# Patient Record
Sex: FEMALE | Race: WHITE | NOT HISPANIC OR LATINO | Employment: UNEMPLOYED | URBAN - METROPOLITAN AREA
[De-identification: names, ages, dates, MRNs, and addresses within clinical notes are randomized per-mention and may not be internally consistent; named-entity substitution may affect disease eponyms.]

---

## 2018-02-05 ENCOUNTER — HOSPITAL ENCOUNTER (OUTPATIENT)
Dept: URGENT CARE | Facility: CLINIC | Age: 58
Setting detail: SPECIMEN
Discharge: HOME | End: 2018-02-05
Attending: FAMILY MEDICINE | Admitting: FAMILY MEDICINE

## 2018-02-05 LAB
AMPICILLIN SUSC ISLT: ABNORMAL
AZTREONAM SUSC ISLT: ABNORMAL
BACTERIA ISLT: ABNORMAL
BACTERIA SPEC AEROBE CULT: ABNORMAL
CEFAZOLIN SUSC ISLT: ABNORMAL
CEFEPIME SUSC ISLT: ABNORMAL
CEFTRIAXONE SUSC ISLT: ABNORMAL
CIPROFLOXACIN SUSC ISLT: ABNORMAL
COLONY COUNT: ABNORMAL
ERTAPENEM SUSC ISLT: ABNORMAL
LEVOFLOXACIN SUSC ISLT: ABNORMAL
Lab: ABNORMAL
MEROPENEM SUSC ISLT: ABNORMAL
MICRO REPORT STATUS: ABNORMAL
NITROFURANTOIN SUSC ISLT: ABNORMAL
PIP+TAZO SUSC ISLT: ABNORMAL
SPECIMEN SOURCE: ABNORMAL
SUSC METH SPEC: ABNORMAL
TETRACYCLINE SUSC ISLT: ABNORMAL
TOBRAMYCIN SUSC ISLT: ABNORMAL
TRIMETHOPRIM/SULFA: ABNORMAL

## 2020-04-22 ENCOUNTER — OFFICE VISIT (OUTPATIENT)
Dept: FAMILY MEDICINE CLINIC | Facility: CLINIC | Age: 60
End: 2020-04-22

## 2020-04-22 VITALS
HEART RATE: 91 BPM | SYSTOLIC BLOOD PRESSURE: 122 MMHG | OXYGEN SATURATION: 97 % | TEMPERATURE: 97 F | WEIGHT: 293 LBS | HEIGHT: 64 IN | BODY MASS INDEX: 50.02 KG/M2 | DIASTOLIC BLOOD PRESSURE: 75 MMHG | RESPIRATION RATE: 18 BRPM

## 2020-04-22 DIAGNOSIS — G35 MULTIPLE SCLEROSIS (HCC): Primary | ICD-10-CM

## 2020-04-22 PROCEDURE — 99203 OFFICE O/P NEW LOW 30 MIN: CPT | Performed by: FAMILY MEDICINE

## 2020-04-22 NOTE — PROGRESS NOTES
Chief Complaint   Patient presents with   • Establish Care     for PCP       History of Present Illness:  Subjective   Mackenzie Cornell is a 59 y.o. female.   History of Present Illness   Establish Care:   Patient is here today to establish care with a PCP. She reports that she hasn't been seen by a doctor since 2016.   She does have a history of MS. She reports that she controls her symptoms with her diet. She has been doing this since 2004. She reports that she is stable with her MS and her symptoms haven't increased or progressed.   She reports the last time she had a physical or labs done was several years ago.   She currently takes no prescriptions or medication other than the multivitamin.   She denies pain at this time.     Allergies:  Allergies   Allergen Reactions   • Sulfa Antibiotics Hives       Social History:  Social History     Socioeconomic History   • Marital status:      Spouse name: Not on file   • Number of children: Not on file   • Years of education: Not on file   • Highest education level: Not on file       Family History:  History reviewed. No pertinent family history.    Past Medical History :  Active Ambulatory Problems     Diagnosis Date Noted   • Multiple sclerosis (CMS/[facility]) 07/11/2016   • Body mass index (BMI) of 50-59.9 in adult (CMS/[facility]) 11/28/2017   • Transient global amnesia 07/11/2016     Resolved Ambulatory Problems     Diagnosis Date Noted   • No Resolved Ambulatory Problems     No Additional Past Medical History       Medication List:  Outpatient Encounter Medications as of 4/22/2020   Medication Sig Dispense Refill   • Multiple Vitamins-Minerals (MULTI FOR HER) pack 1 tablet Daily.       No facility-administered encounter medications on file as of 4/22/2020.        Past Surgical History:  Past Surgical History:   Procedure Laterality Date   • TONSILLECTOMY          The following portions of the patient's history were reviewed and updated as appropriate: allergies, current  "medications, past family history, past medical history, past social history, past surgical history and problem list.    Review Of Systems:  Review of Systems   Constitutional: Negative for activity change, appetite change and fatigue.   HENT: Negative for ear discharge, ear pain, postnasal drip and rhinorrhea.    Eyes: Negative for double vision and discharge.   Respiratory: Negative for cough, chest tightness and shortness of breath.    Cardiovascular: Negative for chest pain.   Endocrine: Negative for cold intolerance and heat intolerance.   Musculoskeletal: Negative for back pain, gait problem and joint swelling.   Skin: Negative for color change and rash.   Neurological: Negative for dizziness, facial asymmetry and confusion.   Psychiatric/Behavioral: Negative for behavioral problems.       Objective     Physical Exam:  Vital Signs:  Visit Vitals  /75   Pulse 91   Temp 97 °F (36.1 °C)   Resp 18   Ht 162.6 cm (64\")   Wt (!) 144 kg (318 lb 6.4 oz)   SpO2 97%   BMI 54.65 kg/m²       Physical Exam   Constitutional: She is oriented to person, place, and time. She appears well-developed and well-nourished.   HENT:   Head: Normocephalic.   Right Ear: External ear normal.   Left Ear: External ear normal.   Nose: Nose normal.   Eyes: Conjunctivae are normal.   Neck: Normal range of motion. Neck supple.   Cardiovascular: Normal rate and regular rhythm.   Pulmonary/Chest: Effort normal and breath sounds normal.   Musculoskeletal: Normal range of motion.   Neurological: She is alert and oriented to person, place, and time.   Skin: Skin is warm and dry. Capillary refill takes less than 2 seconds.   Vitals reviewed.      Assessment/Plan   Assessment and Plan:  Diagnoses and all orders for this visit:    1. Multiple sclerosis (CMS/[facility]) (Primary)  Assessment & Plan:  She is currently stable without medicine.   She currently is seen by Dr. Figueroa for MS.                          "

## 2021-10-14 ENCOUNTER — OFFICE VISIT (OUTPATIENT)
Dept: FAMILY MEDICINE CLINIC | Facility: CLINIC | Age: 61
End: 2021-10-14

## 2021-10-14 VITALS
OXYGEN SATURATION: 98 % | BODY MASS INDEX: 50.02 KG/M2 | DIASTOLIC BLOOD PRESSURE: 74 MMHG | HEIGHT: 64 IN | HEART RATE: 72 BPM | TEMPERATURE: 97.1 F | SYSTOLIC BLOOD PRESSURE: 136 MMHG | WEIGHT: 293 LBS | RESPIRATION RATE: 20 BRPM

## 2021-10-14 DIAGNOSIS — R53.81 MALAISE AND FATIGUE: ICD-10-CM

## 2021-10-14 DIAGNOSIS — Z13.220 SCREENING FOR HYPERLIPIDEMIA: ICD-10-CM

## 2021-10-14 DIAGNOSIS — R53.83 MALAISE AND FATIGUE: ICD-10-CM

## 2021-10-14 DIAGNOSIS — Z13.29 SCREENING FOR HYPOTHYROIDISM: ICD-10-CM

## 2021-10-14 DIAGNOSIS — Z12.31 ENCOUNTER FOR SCREENING MAMMOGRAM FOR BREAST CANCER: ICD-10-CM

## 2021-10-14 DIAGNOSIS — Z00.00 PHYSICAL EXAM: Primary | ICD-10-CM

## 2021-10-14 PROCEDURE — 99396 PREV VISIT EST AGE 40-64: CPT | Performed by: FAMILY MEDICINE

## 2021-10-18 LAB
C TRACH RRNA CVX QL NAA+PROBE: NEGATIVE
CYTOLOGIST CVX/VAG CYTO: NORMAL
CYTOLOGY CVX/VAG DOC CYTO: NORMAL
CYTOLOGY CVX/VAG DOC THIN PREP: NORMAL
DX ICD CODE: NORMAL
HIV 1 & 2 AB SER-IMP: NORMAL
HPV I/H RISK 4 DNA CVX QL PROBE+SIG AMP: NEGATIVE
N GONORRHOEA RRNA CVX QL NAA+PROBE: NEGATIVE
OTHER STN SPEC: NORMAL
STAT OF ADQ CVX/VAG CYTO-IMP: NORMAL

## 2021-11-02 PROBLEM — Z00.00 PHYSICAL EXAM: Status: ACTIVE | Noted: 2021-11-02

## 2021-11-08 ENCOUNTER — HOSPITAL ENCOUNTER (OUTPATIENT)
Dept: MAMMOGRAPHY | Facility: HOSPITAL | Age: 61
Discharge: HOME | End: 2021-11-08
Admitting: FAMILY MEDICINE

## 2021-11-08 PROCEDURE — 77063 BREAST TOMOSYNTHESIS BI: CPT

## 2021-11-08 PROCEDURE — 77067 SCR MAMMO BI INCL CAD: CPT

## 2021-11-17 ENCOUNTER — TELEPHONE (OUTPATIENT)
Dept: FAMILY MEDICINE CLINIC | Facility: CLINIC | Age: 61
End: 2021-11-17

## 2021-11-17 NOTE — TELEPHONE ENCOUNTER
PATIENT STATES: THAT SHE WOULD LIKE A CALL BACK TO GO OVER HER MAMMOGRAM PLEASE ADVISE      PATIENT CAN BE REACHED ON:

## 2021-11-18 NOTE — TELEPHONE ENCOUNTER
PATIENT CALLED BACK REQUESTING A CALL BACK WITH THE RESULTS OF HER MAMMOGRAM.    PATIENT CALLBACK: [phone]

## 2021-11-19 ENCOUNTER — APPOINTMENT (OUTPATIENT)
Dept: MAMMOGRAPHY | Facility: HOSPITAL | Age: 61
End: 2021-11-19

## 2023-11-15 ENCOUNTER — HOSPITAL ENCOUNTER (INPATIENT)
Facility: HOSPITAL | Age: 63
LOS: 3 days | Discharge: HOME | DRG: 689 | End: 2023-11-18
Attending: EMERGENCY MEDICINE | Admitting: HOSPITALIST
Payer: COMMERCIAL

## 2023-11-15 ENCOUNTER — APPOINTMENT (OUTPATIENT)
Dept: CT IMAGING | Facility: HOSPITAL | Age: 63
DRG: 689 | End: 2023-11-15
Payer: COMMERCIAL

## 2023-11-15 DIAGNOSIS — R41.0 CONFUSION: ICD-10-CM

## 2023-11-15 DIAGNOSIS — N39.0 URINARY TRACT INFECTION WITHOUT HEMATURIA, SITE UNSPECIFIED: Primary | ICD-10-CM

## 2023-11-15 DIAGNOSIS — G93.41 ACUTE METABOLIC ENCEPHALOPATHY: ICD-10-CM

## 2023-11-15 LAB
ALBUMIN SERPL-MCNC: 3.4 G/DL (ref 3.5–5.2)
ALBUMIN/GLOB SERPL: 1 G/DL
ALP SERPL-CCNC: 56 U/L (ref 39–117)
ALT SERPL W P-5'-P-CCNC: 19 U/L (ref 1–33)
AMMONIA BLD-SCNC: 11 UMOL/L (ref 11–51)
AMPHET+METHAMPHET UR QL: NEGATIVE
ANION GAP SERPL CALCULATED.3IONS-SCNC: 12 MMOL/L (ref 5–15)
AST SERPL-CCNC: 28 U/L (ref 1–32)
BACTERIA UR QL AUTO: ABNORMAL /HPF
BARBITURATES UR QL SCN: NEGATIVE
BASOPHILS # BLD AUTO: 0.1 10*3/MM3 (ref 0–0.2)
BASOPHILS NFR BLD AUTO: 0.9 % (ref 0–1.5)
BENZODIAZ UR QL SCN: NEGATIVE
BILIRUB SERPL-MCNC: 0.7 MG/DL (ref 0–1.2)
BILIRUB UR QL STRIP: ABNORMAL
BUN SERPL-MCNC: 9 MG/DL (ref 8–23)
BUN/CREAT SERPL: 12.7 (ref 7–25)
CALCIUM SPEC-SCNC: 8.5 MG/DL (ref 8.6–10.5)
CANNABINOIDS SERPL QL: NEGATIVE
CHLORIDE SERPL-SCNC: 95 MMOL/L (ref 98–107)
CLARITY UR: ABNORMAL
CO2 SERPL-SCNC: 27 MMOL/L (ref 22–29)
COCAINE UR QL: NEGATIVE
COHGB MFR BLD: 3.2 % (ref 0–3)
COLOR UR: ABNORMAL
CREAT SERPL-MCNC: 0.71 MG/DL (ref 0.57–1)
D-LACTATE SERPL-SCNC: 1.3 MMOL/L (ref 0.3–2)
DEPRECATED RDW RBC AUTO: 43.3 FL (ref 37–54)
EGFRCR SERPLBLD CKD-EPI 2021: 94.5 ML/MIN/1.73
EOSINOPHIL # BLD AUTO: 0 10*3/MM3 (ref 0–0.4)
EOSINOPHIL NFR BLD AUTO: 0.6 % (ref 0.3–6.2)
ERYTHROCYTE [DISTWIDTH] IN BLOOD BY AUTOMATED COUNT: 14.4 % (ref 12.3–15.4)
ETHANOL UR QL: <0.01 %
GLOBULIN UR ELPH-MCNC: 3.3 GM/DL
GLUCOSE SERPL-MCNC: 106 MG/DL (ref 65–99)
GLUCOSE UR STRIP-MCNC: NEGATIVE MG/DL
HCT VFR BLD AUTO: 40.9 % (ref 34–46.6)
HGB BLD-MCNC: 13.5 G/DL (ref 12–15.9)
HGB UR QL STRIP.AUTO: ABNORMAL
HYALINE CASTS UR QL AUTO: ABNORMAL /LPF
KETONES UR QL STRIP: ABNORMAL
LEUKOCYTE ESTERASE UR QL STRIP.AUTO: ABNORMAL
LYMPHOCYTES # BLD AUTO: 1.7 10*3/MM3 (ref 0.7–3.1)
LYMPHOCYTES NFR BLD AUTO: 23 % (ref 19.6–45.3)
MAGNESIUM SERPL-MCNC: 2.1 MG/DL (ref 1.6–2.4)
MCH RBC QN AUTO: 28.5 PG (ref 26.6–33)
MCHC RBC AUTO-ENTMCNC: 33.1 G/DL (ref 31.5–35.7)
MCV RBC AUTO: 86 FL (ref 79–97)
METHADONE UR QL SCN: NEGATIVE
MONOCYTES # BLD AUTO: 0.7 10*3/MM3 (ref 0.1–0.9)
MONOCYTES NFR BLD AUTO: 9.6 % (ref 5–12)
MUCOUS THREADS URNS QL MICRO: ABNORMAL /HPF
NEUTROPHILS NFR BLD AUTO: 4.9 10*3/MM3 (ref 1.7–7)
NEUTROPHILS NFR BLD AUTO: 65.9 % (ref 42.7–76)
NITRITE UR QL STRIP: POSITIVE
NRBC BLD AUTO-RTO: 0.1 /100 WBC (ref 0–0.2)
OPIATES UR QL: NEGATIVE
OXYCODONE UR QL SCN: NEGATIVE
PH UR STRIP.AUTO: 7 [PH] (ref 5–8)
PLATELET # BLD AUTO: 313 10*3/MM3 (ref 140–450)
PMV BLD AUTO: 7.8 FL (ref 6–12)
POTASSIUM SERPL-SCNC: 3.5 MMOL/L (ref 3.5–5.2)
PROT SERPL-MCNC: 6.7 G/DL (ref 6–8.5)
PROT UR QL STRIP: ABNORMAL
RBC # BLD AUTO: 4.76 10*6/MM3 (ref 3.77–5.28)
RBC # UR STRIP: ABNORMAL /HPF
REF LAB TEST METHOD: ABNORMAL
SODIUM SERPL-SCNC: 134 MMOL/L (ref 136–145)
SP GR UR STRIP: 1.02 (ref 1–1.03)
SQUAMOUS #/AREA URNS HPF: ABNORMAL /HPF
TSH SERPL DL<=0.05 MIU/L-ACNC: 1.4 UIU/ML (ref 0.27–4.2)
UROBILINOGEN UR QL STRIP: ABNORMAL
WBC # UR STRIP: ABNORMAL /HPF
WBC NRBC COR # BLD: 7.4 10*3/MM3 (ref 3.4–10.8)
WHOLE BLOOD HOLD COAG: NORMAL

## 2023-11-15 PROCEDURE — 80307 DRUG TEST PRSMV CHEM ANLYZR: CPT | Performed by: EMERGENCY MEDICINE

## 2023-11-15 PROCEDURE — 87088 URINE BACTERIA CULTURE: CPT | Performed by: EMERGENCY MEDICINE

## 2023-11-15 PROCEDURE — 93005 ELECTROCARDIOGRAM TRACING: CPT | Performed by: EMERGENCY MEDICINE

## 2023-11-15 PROCEDURE — 84443 ASSAY THYROID STIM HORMONE: CPT | Performed by: EMERGENCY MEDICINE

## 2023-11-15 PROCEDURE — 82375 ASSAY CARBOXYHB QUANT: CPT | Performed by: EMERGENCY MEDICINE

## 2023-11-15 PROCEDURE — 80053 COMPREHEN METABOLIC PANEL: CPT | Performed by: EMERGENCY MEDICINE

## 2023-11-15 PROCEDURE — 82077 ASSAY SPEC XCP UR&BREATH IA: CPT | Performed by: EMERGENCY MEDICINE

## 2023-11-15 PROCEDURE — 82140 ASSAY OF AMMONIA: CPT | Performed by: EMERGENCY MEDICINE

## 2023-11-15 PROCEDURE — 70450 CT HEAD/BRAIN W/O DYE: CPT

## 2023-11-15 PROCEDURE — P9612 CATHETERIZE FOR URINE SPEC: HCPCS

## 2023-11-15 PROCEDURE — 25010000002 CEFTRIAXONE PER 250 MG: Performed by: EMERGENCY MEDICINE

## 2023-11-15 PROCEDURE — 36415 COLL VENOUS BLD VENIPUNCTURE: CPT

## 2023-11-15 PROCEDURE — 83735 ASSAY OF MAGNESIUM: CPT | Performed by: EMERGENCY MEDICINE

## 2023-11-15 PROCEDURE — 99285 EMERGENCY DEPT VISIT HI MDM: CPT

## 2023-11-15 PROCEDURE — 87186 SC STD MICRODIL/AGAR DIL: CPT | Performed by: EMERGENCY MEDICINE

## 2023-11-15 PROCEDURE — 87086 URINE CULTURE/COLONY COUNT: CPT | Performed by: EMERGENCY MEDICINE

## 2023-11-15 PROCEDURE — 81001 URINALYSIS AUTO W/SCOPE: CPT | Performed by: EMERGENCY MEDICINE

## 2023-11-15 PROCEDURE — 83605 ASSAY OF LACTIC ACID: CPT

## 2023-11-15 PROCEDURE — 85025 COMPLETE CBC W/AUTO DIFF WBC: CPT | Performed by: EMERGENCY MEDICINE

## 2023-11-15 PROCEDURE — 87040 BLOOD CULTURE FOR BACTERIA: CPT | Performed by: EMERGENCY MEDICINE

## 2023-11-15 PROCEDURE — 25810000003 SODIUM CHLORIDE 0.9 % SOLUTION: Performed by: EMERGENCY MEDICINE

## 2023-11-15 RX ORDER — NITROGLYCERIN 0.4 MG/1
0.4 TABLET SUBLINGUAL
Status: DISCONTINUED | OUTPATIENT
Start: 2023-11-15 | End: 2023-11-18 | Stop reason: HOSPADM

## 2023-11-15 RX ORDER — ONDANSETRON 2 MG/ML
4 INJECTION INTRAMUSCULAR; INTRAVENOUS EVERY 6 HOURS PRN
Status: DISCONTINUED | OUTPATIENT
Start: 2023-11-15 | End: 2023-11-18 | Stop reason: HOSPADM

## 2023-11-15 RX ORDER — AMOXICILLIN 250 MG
2 CAPSULE ORAL 2 TIMES DAILY
Status: DISCONTINUED | OUTPATIENT
Start: 2023-11-16 | End: 2023-11-18 | Stop reason: HOSPADM

## 2023-11-15 RX ORDER — SODIUM CHLORIDE 9 MG/ML
40 INJECTION, SOLUTION INTRAVENOUS AS NEEDED
Status: DISCONTINUED | OUTPATIENT
Start: 2023-11-15 | End: 2023-11-18 | Stop reason: HOSPADM

## 2023-11-15 RX ORDER — SODIUM CHLORIDE 0.9 % (FLUSH) 0.9 %
10 SYRINGE (ML) INJECTION EVERY 12 HOURS SCHEDULED
Status: DISCONTINUED | OUTPATIENT
Start: 2023-11-15 | End: 2023-11-18 | Stop reason: HOSPADM

## 2023-11-15 RX ORDER — HEPARIN SODIUM 5000 [USP'U]/ML
5000 INJECTION, SOLUTION INTRAVENOUS; SUBCUTANEOUS EVERY 8 HOURS SCHEDULED
Status: DISCONTINUED | OUTPATIENT
Start: 2023-11-16 | End: 2023-11-18 | Stop reason: HOSPADM

## 2023-11-15 RX ORDER — BISACODYL 10 MG
10 SUPPOSITORY, RECTAL RECTAL DAILY PRN
Status: DISCONTINUED | OUTPATIENT
Start: 2023-11-15 | End: 2023-11-18 | Stop reason: HOSPADM

## 2023-11-15 RX ORDER — SODIUM CHLORIDE 0.9 % (FLUSH) 0.9 %
10 SYRINGE (ML) INJECTION AS NEEDED
Status: DISCONTINUED | OUTPATIENT
Start: 2023-11-15 | End: 2023-11-18 | Stop reason: HOSPADM

## 2023-11-15 RX ORDER — ACETAMINOPHEN 160 MG/5ML
650 SOLUTION ORAL EVERY 4 HOURS PRN
Status: DISCONTINUED | OUTPATIENT
Start: 2023-11-15 | End: 2023-11-17

## 2023-11-15 RX ORDER — POLYETHYLENE GLYCOL 3350 17 G/17G
17 POWDER, FOR SOLUTION ORAL DAILY PRN
Status: DISCONTINUED | OUTPATIENT
Start: 2023-11-15 | End: 2023-11-18 | Stop reason: HOSPADM

## 2023-11-15 RX ORDER — BISACODYL 5 MG/1
5 TABLET, DELAYED RELEASE ORAL DAILY PRN
Status: DISCONTINUED | OUTPATIENT
Start: 2023-11-15 | End: 2023-11-18 | Stop reason: HOSPADM

## 2023-11-15 RX ADMIN — CEFTRIAXONE 2000 MG: 2 INJECTION, POWDER, FOR SOLUTION INTRAMUSCULAR; INTRAVENOUS at 22:42

## 2023-11-15 RX ADMIN — SODIUM CHLORIDE 500 ML: 9 INJECTION, SOLUTION INTRAVENOUS at 22:49

## 2023-11-15 NOTE — Clinical Note
Level of Care: Telemetry [5]   Admitting Physician: CELESTINA FRANCIS [[id number]]   Attending Physician: CELESTINA FRANCIS [[id number]]

## 2023-11-16 PROBLEM — N39.0 ACUTE UTI (URINARY TRACT INFECTION): Status: ACTIVE | Noted: 2023-11-16

## 2023-11-16 LAB
ALBUMIN SERPL-MCNC: 3.3 G/DL (ref 3.5–5.2)
ALBUMIN/GLOB SERPL: 0.9 G/DL
ALP SERPL-CCNC: 58 U/L (ref 39–117)
ALT SERPL W P-5'-P-CCNC: 21 U/L (ref 1–33)
ANION GAP SERPL CALCULATED.3IONS-SCNC: 11 MMOL/L (ref 5–15)
AST SERPL-CCNC: 27 U/L (ref 1–32)
BASOPHILS # BLD AUTO: 0 10*3/MM3 (ref 0–0.2)
BASOPHILS NFR BLD AUTO: 0.7 % (ref 0–1.5)
BILIRUB SERPL-MCNC: 0.5 MG/DL (ref 0–1.2)
BUN SERPL-MCNC: 10 MG/DL (ref 8–23)
BUN/CREAT SERPL: 15.2 (ref 7–25)
CALCIUM SPEC-SCNC: 8.9 MG/DL (ref 8.6–10.5)
CHLORIDE SERPL-SCNC: 98 MMOL/L (ref 98–107)
CO2 SERPL-SCNC: 26 MMOL/L (ref 22–29)
CREAT SERPL-MCNC: 0.66 MG/DL (ref 0.57–1)
DEPRECATED RDW RBC AUTO: 42.9 FL (ref 37–54)
EGFRCR SERPLBLD CKD-EPI 2021: 97.5 ML/MIN/1.73
EOSINOPHIL # BLD AUTO: 0.1 10*3/MM3 (ref 0–0.4)
EOSINOPHIL NFR BLD AUTO: 1.5 % (ref 0.3–6.2)
ERYTHROCYTE [DISTWIDTH] IN BLOOD BY AUTOMATED COUNT: 14.3 % (ref 12.3–15.4)
GLOBULIN UR ELPH-MCNC: 3.8 GM/DL
GLUCOSE SERPL-MCNC: 135 MG/DL (ref 65–99)
HCT VFR BLD AUTO: 40.5 % (ref 34–46.6)
HGB BLD-MCNC: 13.3 G/DL (ref 12–15.9)
LYMPHOCYTES # BLD AUTO: 2 10*3/MM3 (ref 0.7–3.1)
LYMPHOCYTES NFR BLD AUTO: 27.5 % (ref 19.6–45.3)
MCH RBC QN AUTO: 28.6 PG (ref 26.6–33)
MCHC RBC AUTO-ENTMCNC: 32.9 G/DL (ref 31.5–35.7)
MCV RBC AUTO: 87 FL (ref 79–97)
MONOCYTES # BLD AUTO: 0.9 10*3/MM3 (ref 0.1–0.9)
MONOCYTES NFR BLD AUTO: 12.4 % (ref 5–12)
NEUTROPHILS NFR BLD AUTO: 4.1 10*3/MM3 (ref 1.7–7)
NEUTROPHILS NFR BLD AUTO: 57.9 % (ref 42.7–76)
NRBC BLD AUTO-RTO: 0.1 /100 WBC (ref 0–0.2)
PLATELET # BLD AUTO: 318 10*3/MM3 (ref 140–450)
PMV BLD AUTO: 7.6 FL (ref 6–12)
POTASSIUM SERPL-SCNC: 3.1 MMOL/L (ref 3.5–5.2)
PROT SERPL-MCNC: 7.1 G/DL (ref 6–8.5)
QT INTERVAL: 345 MS
QTC INTERVAL: 430 MS
RBC # BLD AUTO: 4.65 10*6/MM3 (ref 3.77–5.28)
SODIUM SERPL-SCNC: 135 MMOL/L (ref 136–145)
WBC NRBC COR # BLD AUTO: 7.1 10*3/MM3 (ref 3.4–10.8)

## 2023-11-16 PROCEDURE — 25010000002 CEFTRIAXONE PER 250 MG: Performed by: NURSE PRACTITIONER

## 2023-11-16 PROCEDURE — 36415 COLL VENOUS BLD VENIPUNCTURE: CPT | Performed by: NURSE PRACTITIONER

## 2023-11-16 PROCEDURE — 25010000002 ONDANSETRON PER 1 MG: Performed by: NURSE PRACTITIONER

## 2023-11-16 PROCEDURE — 80053 COMPREHEN METABOLIC PANEL: CPT | Performed by: NURSE PRACTITIONER

## 2023-11-16 PROCEDURE — 97162 PT EVAL MOD COMPLEX 30 MIN: CPT

## 2023-11-16 PROCEDURE — 97165 OT EVAL LOW COMPLEX 30 MIN: CPT

## 2023-11-16 PROCEDURE — 25010000002 HEPARIN (PORCINE) PER 1000 UNITS: Performed by: NURSE PRACTITIONER

## 2023-11-16 PROCEDURE — 85025 COMPLETE CBC W/AUTO DIFF WBC: CPT | Performed by: NURSE PRACTITIONER

## 2023-11-16 RX ADMIN — CEFTRIAXONE 2000 MG: 2 INJECTION, POWDER, FOR SOLUTION INTRAMUSCULAR; INTRAVENOUS at 21:07

## 2023-11-16 RX ADMIN — HEPARIN SODIUM 5000 UNITS: 5000 INJECTION INTRAVENOUS; SUBCUTANEOUS at 18:22

## 2023-11-16 RX ADMIN — Medication 10 ML: at 21:39

## 2023-11-16 RX ADMIN — ONDANSETRON 4 MG: 2 INJECTION INTRAMUSCULAR; INTRAVENOUS at 21:10

## 2023-11-16 RX ADMIN — DOCUSATE SODIUM 50MG AND SENNOSIDES 8.6MG 2 TABLET: 8.6; 5 TABLET, FILM COATED ORAL at 21:10

## 2023-11-16 NOTE — PLAN OF CARE
Problem: Adult Inpatient Plan of Care  Goal: Plan of Care Review  Outcome: Ongoing, Not Progressing  Flowsheets (Taken 11/15/2023 2330)  Progress: no change  Plan of Care Reviewed With:   patient   spouse   daughter  Outcome Evaluation: patient stable, getting IV abx, PT/OT consult, VSS, SR on tele, no complaints, plan of care continued  Goal: Patient-Specific Goal (Individualized)  Outcome: Ongoing, Not Progressing  Goal: Absence of Hospital-Acquired Illness or Injury  Outcome: Ongoing, Not Progressing  Intervention: Identify and Manage Fall Risk  Recent Flowsheet Documentation  Taken 11/15/2023 2330 by Lynne Scales, RN  Safety Promotion/Fall Prevention:   safety round/check completed   activity supervised   assistive device/personal items within reach   clutter free environment maintained   elopement precautions   fall prevention program maintained   gait belt   lighting adjusted   mobility aid in reach   nonskid shoes/slippers when out of bed   room organization consistent  Intervention: Prevent Skin Injury  Recent Flowsheet Documentation  Taken 11/15/2023 2330 by Lynne Scales, RN  Body Position:   supine   sitting up in bed   position changed independently  Intervention: Prevent and Manage VTE (Venous Thromboembolism) Risk  Recent Flowsheet Documentation  Taken 11/15/2023 2330 by Lynne Scales, RN  Activity Management: up to bedside commode  Goal: Optimal Comfort and Wellbeing  Outcome: Ongoing, Not Progressing  Intervention: Provide Person-Centered Care  Recent Flowsheet Documentation  Taken 11/15/2023 2330 by Lynne Scales, RN  Trust Relationship/Rapport:   care explained   choices provided   emotional support provided   empathic listening provided   questions answered   questions encouraged   reassurance provided   thoughts/feelings acknowledged  Goal: Readiness for Transition of Care  Outcome: Ongoing, Not Progressing  Intervention: Mutually Develop Transition Plan  Recent Flowsheet  Documentation  Taken 11/15/2023 2338 by Lynne Scales, RN  Equipment Currently Used at Home: (uses 2 canes at a time) cane, straight  Taken 11/15/2023 2337 by Lynne Scales, RN  Transportation Anticipated: family or friend will provide  Patient/Family Anticipated Services at Transition: none  Patient/Family Anticipates Transition to: home with family     Problem: Fall Injury Risk  Goal: Absence of Fall and Fall-Related Injury  Outcome: Ongoing, Not Progressing  Intervention: Identify and Manage Contributors  Recent Flowsheet Documentation  Taken 11/15/2023 2330 by Lynne Scales, RN  Medication Review/Management: medications reviewed  Intervention: Promote Injury-Free Environment  Recent Flowsheet Documentation  Taken 11/15/2023 2330 by Lynne Scales, RN  Safety Promotion/Fall Prevention:   safety round/check completed   activity supervised   assistive device/personal items within reach   clutter free environment maintained   elopement precautions   fall prevention program maintained   gait belt   lighting adjusted   mobility aid in reach   nonskid shoes/slippers when out of bed   room organization consistent     Problem: UTI (Urinary Tract Infection)  Goal: Improved Infection Symptoms  Outcome: Ongoing, Not Progressing   Goal Outcome Evaluation:  Plan of Care Reviewed With: patient, spouse, daughter        Progress: no change  Outcome Evaluation: patient stable, getting IV abx, PT/OT consult, VSS, SR on tele, no complaints, plan of care continued

## 2023-11-16 NOTE — ED NOTES
Pt and family reports increased confusion the past few days, with difficulty putting sentences together. Pt reports difficulty thinking and putting sentences together. Pt is A&Ox4. Pts family reports past hx of UTI's but never has affected her like this.

## 2023-11-16 NOTE — NURSING NOTE
Patient refusing IV placement at this time, wanted to wait until later this morning, will also get labs when we get new IV access. Patient has intermittent confusion and is confused at this time, alert to self and place, d/o to time and situation, plan of care continued

## 2023-11-16 NOTE — PLAN OF CARE
Goal Outcome Evaluation:  Plan of Care Reviewed With: patient, daughter        Progress: improving   Pt is a 64 y/o F admitted to [facility] on 11/15/23 with complaints of AMS over the past few days with trouble with syntax and aphasia. UA (+) for UTI. She also has a history of MS. Diagnosed this visit with acute metabolic encephalopathy secondary to fall ~1 week ago. She is currently living with spouse in multi-level home with 4 steps to enter and flight of steps to upstairs bedroom. At baseline, she reports being IND with household mobility, community ambulation, and ADLs with 2 SPCs in BLE. This date, pt is disoriented to situation and time. She is sitting up in recliner at time of PT eval. She completes transfers CGA>SBA and amb 30' CGA>min A with SPCs in BUE. She is very unsteady on her feet and exhibits very poor activity tolerance. Significant labored breathing and BLE weakness noted near the of the bout. Pt was educated on usage of RW at d/c for short-term usage to help decrease risk for falls and decrease amount of energy expenditure used for ambulation. Pt is well below baseline functioning at this time and will require skilled PT intervention during stay. She should be able to d/c back home with HHPT at d/c pending progress during stay. PT will follow during stay.    Anticipated Discharge Disposition (PT): home with home health

## 2023-11-16 NOTE — ED PROVIDER NOTES
Subjective   History of Present Illness  63-year-old female presents with family stating she had some altered mental status over the last few days.  States she had fallen a week ago.  Otherwise they report no new trauma she denies any pain she states she has no headache or chest pain or abdominal pain she had no vomiting or diarrhea.  She states she has been eating and drinking normally.  She reports no fevers or chills.  She reports no shortness of breath.  There is no reported focal numbness or weakness.  She denies any unusual ingestions and is currently on no medications.   states she has been talking confused and not able to put together thoughts coherently.  Review of Systems    Past Medical History:   Diagnosis Date    Transient global amnesia     about 10 years, and resolved within 1 day     Transient global amnesia, multiple sclerosis  Allergies   Allergen Reactions    Sulfa Antibiotics Hives       Past Surgical History:   Procedure Laterality Date    COLONOSCOPY      TONSILLECTOMY      WISDOM TOOTH EXTRACTION         Family History   Problem Relation Age of Onset    Hypertension Mother     Heart disease Mother         A-fib    Other Father         Alzheimers, dementia    Asthma Brother     No Known Problems Daughter     Asthma Son     Other Son         sleep apnea, narcelpsy       Social History     Socioeconomic History    Marital status:    Tobacco Use    Smoking status: Never    Smokeless tobacco: Never   Vaping Use    Vaping Use: Never used   Substance and Sexual Activity    Alcohol use: Yes     Comment: occasional    Drug use: Never    Sexual activity: Defer     Prior to Admission medications    Medication Sig Start Date End Date Taking? Authorizing Provider   Multiple Vitamins-Minerals (MULTI FOR HER) pack 1 tablet Daily. 7/11/16   Provider, MD Ava     /76 (BP Location: Left arm, Patient Position: Sitting)   Pulse 84   Temp 97.6 °F (36.4 °C)   Resp 18   Ht 162.6 cm  "(64.02\")   SpO2 96%   BMI 55.21 kg/m²         Objective   Physical Exam  General: Obese female in no acute distress, awake and alert  Eyes: Pupils midsized and equal, sclera nonicteric  HEENT: Mucous membranes somewhat dry, no mucosal swelling  Neck: Supple, no nuchal rigidity, no JVD  Respirations: Respirations nonlabored, equal breath sounds bilaterally, clear lungs  Heart regular rate and rhythm, no murmurs rubs or gallops,   Abdomen soft nontender nondistended, no hepatosplenomegaly, no hernia, no mass, normal bowel sounds, no CVA tenderness  Extremities no clubbing cyanosis or edema, calves are symmetric and nontender  Neuro cranial nerves II through XII intact , normal sensory/motor function and strength in four extremities, no slurred speech, no facial droop, normal finger to nose,  no nuchal rigidity  Psych oriented to person and place, cooperative; somewhat slow to respond  Skin no rash, brisk cap refill  Procedures           ED Course  ED Course as of 11/17/23 0741   Wed Nov 15, 2023   2209 Case and findings discussed with Rosemarie with the hospitalist service for admission. [SH]   2220 CT radiology report to be followed by Kirk MCCARTHY in the emergency room. [SH]      ED Course User Index  [SH] Braulio Alexander MD      Results for orders placed or performed during the hospital encounter of 11/15/23   Urine Culture - Urine, Straight Cath    Specimen: Straight Cath; Urine   Result Value Ref Range    Urine Culture >100,000 CFU/mL Escherichia coli (A)        Susceptibility    Escherichia coli - TRUPTI     Ampicillin  Resistant ug/ml     Ampicillin + Sulbactam  Resistant ug/ml     Cefazolin  Susceptible ug/ml     Cefepime  Susceptible ug/ml     Ceftazidime  Susceptible ug/ml     Ceftriaxone  Susceptible ug/ml     Gentamicin  Susceptible ug/ml     Levofloxacin  Susceptible ug/ml     Nitrofurantoin  Susceptible ug/ml     Piperacillin + Tazobactam  Susceptible ug/ml     Trimethoprim + Sulfamethoxazole  Susceptible " ug/ml   Blood Culture - Blood, Arm, Left    Specimen: Arm, Left; Blood   Result Value Ref Range    Blood Culture No growth at 24 hours    Blood Culture - Blood, Arm, Right    Specimen: Arm, Right; Blood   Result Value Ref Range    Blood Culture No growth at 24 hours    Comprehensive Metabolic Panel    Specimen: Blood   Result Value Ref Range    Glucose 106 (H) 65 - 99 mg/dL    BUN 9 8 - 23 mg/dL    Creatinine 0.71 0.57 - 1.00 mg/dL    Sodium 134 (L) 136 - 145 mmol/L    Potassium 3.5 3.5 - 5.2 mmol/L    Chloride 95 (L) 98 - 107 mmol/L    CO2 27.0 22.0 - 29.0 mmol/L    Calcium 8.5 (L) 8.6 - 10.5 mg/dL    Total Protein 6.7 6.0 - 8.5 g/dL    Albumin 3.4 (L) 3.5 - 5.2 g/dL    ALT (SGPT) 19 1 - 33 U/L    AST (SGOT) 28 1 - 32 U/L    Alkaline Phosphatase 56 39 - 117 U/L    Total Bilirubin 0.7 0.0 - 1.2 mg/dL    Globulin 3.3 gm/dL    A/G Ratio 1.0 g/dL    BUN/Creatinine Ratio 12.7 7.0 - 25.0    Anion Gap 12.0 5.0 - 15.0 mmol/L    eGFR 94.5 >60.0 mL/min/1.73   Urinalysis With Culture If Indicated - Straight Cath    Specimen: Straight Cath; Urine   Result Value Ref Range    Color, UA Dark Yellow (A) Yellow, Straw    Appearance, UA Turbid (A) Clear    pH, UA 7.0 5.0 - 8.0    Specific Gravity, UA 1.017 1.005 - 1.030    Glucose, UA Negative Negative    Ketones, UA 15 mg/dL (1+) (A) Negative    Bilirubin, UA Small (1+) (A) Negative    Blood, UA Moderate (2+) (A) Negative    Protein,  mg/dL (2+) (A) Negative    Leuk Esterase, UA Large (3+) (A) Negative    Nitrite, UA Positive (A) Negative    Urobilinogen, UA 2.0 E.U./dL (A) 0.2 - 1.0 E.U./dL   Ethanol    Specimen: Blood   Result Value Ref Range    Ethanol % <0.010 %   Urine Drug Screen - Straight Cath    Specimen: Straight Cath; Urine   Result Value Ref Range    Amphet/Methamphet, Screen Negative Negative    Barbiturates Screen, Urine Negative Negative    Benzodiazepine Screen, Urine Negative Negative    Cocaine Screen, Urine Negative Negative    Opiate Screen Negative  Negative    THC, Screen, Urine Negative Negative    Methadone Screen, Urine Negative Negative    Oxycodone Screen, Urine Negative Negative   TSH    Specimen: Blood   Result Value Ref Range    TSH 1.400 0.270 - 4.200 uIU/mL   Magnesium    Specimen: Blood   Result Value Ref Range    Magnesium 2.1 1.6 - 2.4 mg/dL   Ammonia    Specimen: Blood   Result Value Ref Range    Ammonia 11 11 - 51 umol/L   Carbon Monoxide, Blood    Specimen: Blood   Result Value Ref Range    Carbon Monoxide, Blood 3.2 (H) 0 - 3 %   CBC Auto Differential    Specimen: Blood   Result Value Ref Range    WBC 7.40 3.40 - 10.80 10*3/mm3    RBC 4.76 3.77 - 5.28 10*6/mm3    Hemoglobin 13.5 12.0 - 15.9 g/dL    Hematocrit 40.9 34.0 - 46.6 %    MCV 86.0 79.0 - 97.0 fL    MCH 28.5 26.6 - 33.0 pg    MCHC 33.1 31.5 - 35.7 g/dL    RDW 14.4 12.3 - 15.4 %    RDW-SD 43.3 37.0 - 54.0 fl    MPV 7.8 6.0 - 12.0 fL    Platelets 313 140 - 450 10*3/mm3    Neutrophil % 65.9 42.7 - 76.0 %    Lymphocyte % 23.0 19.6 - 45.3 %    Monocyte % 9.6 5.0 - 12.0 %    Eosinophil % 0.6 0.3 - 6.2 %    Basophil % 0.9 0.0 - 1.5 %    Neutrophils, Absolute 4.90 1.70 - 7.00 10*3/mm3    Lymphocytes, Absolute 1.70 0.70 - 3.10 10*3/mm3    Monocytes, Absolute 0.70 0.10 - 0.90 10*3/mm3    Eosinophils, Absolute 0.00 0.00 - 0.40 10*3/mm3    Basophils, Absolute 0.10 0.00 - 0.20 10*3/mm3    nRBC 0.1 0.0 - 0.2 /100 WBC   Urinalysis, Microscopic Only - Straight Cath    Specimen: Straight Cath; Urine   Result Value Ref Range    RBC, UA 11-20 (A) None Seen, 0-2 /HPF    WBC, UA Too Numerous to Count (A) None Seen, 0-2 /HPF    Bacteria, UA 4+ (A) None Seen /HPF    Squamous Epithelial Cells, UA 3-6 (A) None Seen, 0-2 /HPF    Hyaline Casts, UA 7-12 None Seen /LPF    Mucus, UA Small/1+ (A) None Seen, Trace /HPF    Methodology Manual Light Microscopy    Comprehensive Metabolic Panel    Specimen: Blood   Result Value Ref Range    Glucose 135 (H) 65 - 99 mg/dL    BUN 10 8 - 23 mg/dL    Creatinine 0.66 0.57 -  1.00 mg/dL    Sodium 135 (L) 136 - 145 mmol/L    Potassium 3.1 (L) 3.5 - 5.2 mmol/L    Chloride 98 98 - 107 mmol/L    CO2 26.0 22.0 - 29.0 mmol/L    Calcium 8.9 8.6 - 10.5 mg/dL    Total Protein 7.1 6.0 - 8.5 g/dL    Albumin 3.3 (L) 3.5 - 5.2 g/dL    ALT (SGPT) 21 1 - 33 U/L    AST (SGOT) 27 1 - 32 U/L    Alkaline Phosphatase 58 39 - 117 U/L    Total Bilirubin 0.5 0.0 - 1.2 mg/dL    Globulin 3.8 gm/dL    A/G Ratio 0.9 g/dL    BUN/Creatinine Ratio 15.2 7.0 - 25.0    Anion Gap 11.0 5.0 - 15.0 mmol/L    eGFR 97.5 >60.0 mL/min/1.73   CBC Auto Differential    Specimen: Blood   Result Value Ref Range    WBC 7.10 3.40 - 10.80 10*3/mm3    RBC 4.65 3.77 - 5.28 10*6/mm3    Hemoglobin 13.3 12.0 - 15.9 g/dL    Hematocrit 40.5 34.0 - 46.6 %    MCV 87.0 79.0 - 97.0 fL    MCH 28.6 26.6 - 33.0 pg    MCHC 32.9 31.5 - 35.7 g/dL    RDW 14.3 12.3 - 15.4 %    RDW-SD 42.9 37.0 - 54.0 fl    MPV 7.6 6.0 - 12.0 fL    Platelets 318 140 - 450 10*3/mm3    Neutrophil % 57.9 42.7 - 76.0 %    Lymphocyte % 27.5 19.6 - 45.3 %    Monocyte % 12.4 (H) 5.0 - 12.0 %    Eosinophil % 1.5 0.3 - 6.2 %    Basophil % 0.7 0.0 - 1.5 %    Neutrophils, Absolute 4.10 1.70 - 7.00 10*3/mm3    Lymphocytes, Absolute 2.00 0.70 - 3.10 10*3/mm3    Monocytes, Absolute 0.90 0.10 - 0.90 10*3/mm3    Eosinophils, Absolute 0.10 0.00 - 0.40 10*3/mm3    Basophils, Absolute 0.00 0.00 - 0.20 10*3/mm3    nRBC 0.1 0.0 - 0.2 /100 WBC   POC Lactate    Specimen: Blood   Result Value Ref Range    Lactate 1.3 0.3 - 2.0 mmol/L   ECG 12 Lead Altered Mental Status   Result Value Ref Range    QT Interval 345 ms    QTC Interval 430 ms   Light Blue Top   Result Value Ref Range    Extra Tube Hold for add-ons.                                           Medical Decision Making  Patient presents with reported altered mental status differential diagnosis including CVA, seizure, toxic exposure, sepsis,    Patient has no focal deficits to suggest CVA.  She is not a candidate for tPA consideration as  the symptoms have been ongoing for the last few days.  She was found to have urinary tract infection and was ordered IV Rocephin.  Blood cultures pending.  She was hemodynamically stable.  She is in no respiratory distress.  Drug screen negative, alcohol negative, comprehensive metabolic panel shows some mild hyponatremia, TSH normal, ammonia normal    Patient and family advised of findings.  She was ordered IV antibiotics and IV fluids and will be admitted for further care.  Patient agreeable to plan.  Hospitalist service paged for admission.    Problems Addressed:  Confusion: complicated acute illness or injury  Urinary tract infection without hematuria, site unspecified: complicated acute illness or injury    Amount and/or Complexity of Data Reviewed  Labs: ordered. Decision-making details documented in ED Course.  Radiology: ordered and independent interpretation performed.     Details: My independent interpretation of CT head image no apparent acute hemorrhage, pending radiology report  ECG/medicine tests: ordered and independent interpretation performed.     Details: My independent interpretation of EKG sinus rhythm rate of 93, no acute ST or T wave abnormality    Risk  Prescription drug management.  Decision regarding hospitalization.        Final diagnoses:   Urinary tract infection without hematuria, site unspecified   Confusion       ED Disposition  ED Disposition       ED Disposition   Decision to Admit    Condition   --    Comment   Level of Care: Telemetry [5]   Diagnosis: Acute metabolic encephalopathy [[id number]]   Admitting Physician: CELESTINA FRANCIS [[id number]]   Attending Physician: CELESTINA FRANCIS [[id number]]   Certification: I Certify That Inpatient Hospital Services Are Medically Necessary For Greater Than 2 Midnights                 No follow-up provider specified.       Medication List      No changes were made to your prescriptions during this visit.            Braulio Alexander MD  11/15/23  2220       Braulio Alexander MD  11/17/23 0726

## 2023-11-16 NOTE — PLAN OF CARE
Problem: Adult Inpatient Plan of Care  Goal: Plan of Care Review  Outcome: Ongoing, Progressing  Goal: Patient-Specific Goal (Individualized)  Outcome: Ongoing, Progressing  Goal: Absence of Hospital-Acquired Illness or Injury  Outcome: Ongoing, Progressing  Intervention: Identify and Manage Fall Risk  Recent Flowsheet Documentation  Taken 11/16/2023 0800 by Katia Montez RN  Safety Promotion/Fall Prevention:   fall prevention program maintained   safety round/check completed  Intervention: Prevent Infection  Recent Flowsheet Documentation  Taken 11/16/2023 0800 by Katia Montez RN  Infection Prevention: hand hygiene promoted  Goal: Optimal Comfort and Wellbeing  Outcome: Ongoing, Progressing  Goal: Readiness for Transition of Care  Outcome: Ongoing, Progressing     Problem: Fall Injury Risk  Goal: Absence of Fall and Fall-Related Injury  Outcome: Ongoing, Progressing  Intervention: Identify and Manage Contributors  Recent Flowsheet Documentation  Taken 11/16/2023 0800 by Katia Montez RN  Medication Review/Management: high-risk medications identified  Intervention: Promote Injury-Free Environment  Recent Flowsheet Documentation  Taken 11/16/2023 0800 by Katia Montez RN  Safety Promotion/Fall Prevention:   fall prevention program maintained   safety round/check completed     Problem: UTI (Urinary Tract Infection)  Goal: Improved Infection Symptoms  Outcome: Ongoing, Progressing   Goal Outcome Evaluation:

## 2023-11-16 NOTE — PLAN OF CARE
Goal Outcome Evaluation:  Plan of Care Reviewed With: patient        Progress: no change  Outcome Evaluation: Pt is a 64 y/o F admitted to [facility] on 11/15/23 with complaints of AMS over the past few days with trouble with syntax and aphasia. UA (+) for UTI. She also has a history of MS. Diagnosed this visit with acute metabolic encephalopathy secondary to fall ~1 week ago. Pt. A & O to self and place this date, scores 17/28 on the SBT indidcating severe cognitive impairement. Pt. ambulates w/ CGA Fors afety utilizing bilateral canes, recommend rolling walker for home. Increased assist provided for  LB ADLs this date. ANticipate d/c home w/ 24 hour care/support, will follow up w/ pt. 1-3x per week at [facility].      Anticipated Discharge Disposition (OT): home with 24/7 care

## 2023-11-16 NOTE — PAYOR COMM NOTE
"Mackenzie Cornell (63 y.o. Female)       Date of Birth   1960    Social Security Number       Address   [address] [place] [address] [place] [facility]    Home Phone   [phone]    MRN   [id number]       Confucianist   None    Marital Status                               Admission Date   11/15/23    Admission Type   Emergency    Admitting Provider   Jocy Donaldson MD    Attending Provider   Roberth Dobbins MD    Department, Room/Bed   [facility] EMERGENCY DEPARTMENT,        Discharge Date       Discharge Disposition       Discharge Destination                                 Attending Provider: Roberth Dobbins MD    Allergies: Sulfa Antibiotics    Isolation: None   Infection: None   Code Status: CPR    Ht: 162.6 cm (64\")   Wt: --    Admission Cmt: None   Principal Problem: Acute metabolic encephalopathy [G93.41]                   Active Insurance as of 11/15/2023       Primary Coverage       Payor Plan Insurance Group Employer/Plan Group    AET[employer] COMMERCIAL AETNA [id number]       Payor Plan Address Payor Plan Phone Number Payor Plan Fax Number Effective Dates    [address] [phone]  2019 - None Entered    [facility] [id number]         Subscriber Name Subscriber Birth Date Member ID       FLORENCIO CORNELL 1960 [id number]                     Emergency Contacts        (Rel.) Home Phone Work Phone Mobile Phone    AnetaFlorencio askew (Spouse) -- -- [phone]    Jes Haskins (Daughter) -- -- [phone]                 History & Physical        Rosemarie Garcia APRN at 11/15/23 2313       Attestation signed by Jocy Donaldson MD at 23 0649    I have reviewed this documentation and agree.                      [facility] Medicine Services    Hospitalist History and Physical     Mackenzie Cornell : 1960 MRN:[id number] LOS:0 ROOM:      Reason for admission: Acute metabolic encephalopathy     Assessment / Plan     Acute metabolic encephalopathy  - spouse " reports pt fell 1 week ago and has had progressive confusion  - likely 2/2 UTI  - urinalysis dark yellow, turbid, positive nitrite, large blood, large leukocytes, too numerous to count WBC and 4+ bacteria  - follow culture C&S   - CT head per radiology negative for acute intracranial abnormality  - rocephin 2G started in ED and will continue  - gentle IVF hydration  - culture C&S pending  - PT/OT eval     Multiple Sclerosis   - on no treatment  - PT/OT evaluation    Obesity  - encourage diet and lifestyle changes  -BMI 55    Code Status (Patient has no pulse and is not breathing): CPR (Attempt to Resuscitate)  Medical Interventions (Patient has pulse or is breathing): Full Support       Nutrition:   Diet: Regular/House Diet; Texture: Regular Texture (IDDSI 7); Fluid Consistency: Thin (IDDSI 0)     DVT prophylaxis:  Medical DVT prophylaxis orders are present.     History of Present illness     Mackenzie Cornell is a 63 y.o. female with PMH of multiple sclerosis and obesity presented to the hospital for confusion, and was admitted with a principal diagnosis of Acute metabolic encephalopathy.     Per spouse report at bedside patient sustained a fall last week and had difficulty with standing.  He reports he had noticed that mentation had been altered and was getting progressively worse with difficulty with word finding and slow to respond.  She reports over the past 4 to 5 days she has been more fatigued chilled and generalized body aches.  She denies any chest pain nausea vomiting or dysuria    ED course: Patient was negative for any focal deficits.  CT of the head was negative for any acute intracranial abnormality.  Vital signs temperature max 99.5.  Pulse between 85 and 100.  Blood pressure 147/66.  O2 stable on room air.  Lactic acid 1.3.  Mild hyponatremia with sodium 134.  Negative for leukocytosis.  Hemoglobin stable.  Urinalysis dark yellow turbid positive for nitrite moderate blood 3+ too numerous to count white  blood cells and 4+ bacteria.  She started on IV Rocephin.  Admitted for further treatment    Patient was seen and examined on 11/15/23 at 23:23 EST .    Subjective / Review of systems     Review of Systems   Constitutional:  Positive for fatigue.   Neurological:  Positive for weakness.        Confusion, difficulty with word find   All other systems reviewed and are negative.         Past Medical/Surgical/Social/Family History & Allergies     No past medical history on file.   Past Surgical History:   Procedure Laterality Date    COLONOSCOPY      TONSILLECTOMY      WISDOM TOOTH EXTRACTION        Social History     Socioeconomic History    Marital status:    Tobacco Use    Smoking status: Never    Smokeless tobacco: Never   Substance and Sexual Activity    Alcohol use: Yes    Drug use: Never    Sexual activity: Defer      Family History   Problem Relation Age of Onset    Hypertension Mother     Heart disease Mother         A-fib    Other Father         Alzheimers, dementia    Asthma Brother     No Known Problems Daughter     Asthma Son     Other Son         sleep apnea, narcelpsy      Allergies   Allergen Reactions    Sulfa Antibiotics Hives      Social Determinants of Health     Tobacco Use: Not on file   Alcohol Use: Not on file   Financial Resource Strain: Not on file   Food Insecurity: Not on file   Transportation Needs: Not on file   Physical Activity: Not on file   Stress: Not on file   Social Connections: Unknown (10/12/2023)    Family and Community Support     Help with Day-to-Day Activities: Not on file     Lonely or Isolated: Not on file   Interpersonal Safety: Not At Risk (11/15/2023)    Abuse Screen     Unsafe at Home or Work/School: no     Feels Threatened by Someone?: no     Does Anyone Keep You from Contacting Others or Doint Things Outside the Home?: no     Physical Sign of Abuse Present: no   Depression: Not on file   Housing Stability: Unknown (10/12/2023)    Housing Stability     Current  Living Arrangements: Not on file     Potentially Unsafe Housing Conditions: Not on file   Utilities: Not on file   Health Literacy: Unknown (10/12/2023)    Education     Help with school or training?: Not on file     Preferred Language: Not on file   Employment: Unknown (10/12/2023)    Employment     Do you want help finding or keeping work or a job?: Not on file   Disabilities: Unknown (10/12/2023)    Disabilities     Concentrating, Remembering, or Making Decisions Difficulty: Not on file     Doing Errands Independently Difficulty: Not on file        Home Medications     Prior to Admission medications    Medication Sig Start Date End Date Taking? Authorizing Provider   Multiple Vitamins-Minerals (MULTI FOR HER) pack 1 tablet Daily. 7/11/16   Provider, MD Ava        Objective / Physical Exam     Vital signs:  Temp: 99.5 °F (37.5 °C)  BP: 147/66  Heart Rate: 93  Resp: 15  SpO2: 97 %    Admission Weight:      Physical Exam  Constitutional:       Comments: Pt alert and oriented but movement and speech appear delayed. Difficulty with word find at times   Eyes:      Pupils: Pupils are equal, round, and reactive to light.   Cardiovascular:      Rate and Rhythm: Normal rate and regular rhythm.   Pulmonary:      Effort: Pulmonary effort is normal.      Breath sounds: Normal breath sounds.   Abdominal:      Palpations: Abdomen is soft.      Comments: Obese abdomen    Skin:     Comments: Hyperkeratotic plaquing bilateral lower extremities   Neurological:      General: No focal deficit present.            Labs     Results from last 7 days   Lab Units 11/15/23  2103   WBC 10*3/mm3 7.40   HEMOGLOBIN g/dL 13.5   HEMATOCRIT % 40.9   PLATELETS 10*3/mm3 313      Results from last 7 days   Lab Units 11/15/23  2103   ALK PHOS U/L 56   AST (SGOT) U/L 28   ALT (SGPT) U/L 19           Results from last 7 days   Lab Units 11/15/23  2103   SODIUM mmol/L 134*   POTASSIUM mmol/L 3.5   CHLORIDE mmol/L 95*   CO2 mmol/L 27.0   BUN mg/dL  9   CREATININE mg/dL 0.71   GLUCOSE mg/dL 106*        Imaging     CT Head Without Contrast    Result Date: 11/15/2023  CT HEAD WO CONTRAST Date of Exam: 11/15/2023 9:57 PM EST Indication: confusion. Comparison: None available. Technique: Axial CT images were obtained of the head without contrast administration.  Coronal reconstructions were performed.  Automated exposure control and iterative reconstruction methods were used. Findings: No hemorrhage, edema, or mass effect. Chronic small vessel ischemic changes present in the periventricular white matter. No abnormal extra-axial fluid collection. No fluid in the visualized paranasal sinuses/middle ear cavities     Impression: No acute intracranial process Electronically Signed: Marvin Barrera  11/15/2023 10:22 PM EST  Workstation ID: [id number]      ECG 12 Lead Altered Mental Status   Preliminary Result   HEART RATE= 93  bpm   RR Interval= 644  ms   AR Interval= 134  ms   P Horizontal Axis= -19  deg   P Front Axis= 48  deg   QRSD Interval= 86  ms   QT Interval= 345  ms   QTcB= 430  ms   QRS Axis= 53  deg   T Wave Axis= 31  deg   - BORDERLINE ECG -   Sinus rhythm   Probable left atrial enlargement   When compared with ECG of 12-Jun-2016 0:31:26,   No significant change   Electronically Signed By:    Date and Time of Study: 2023-11-15 20:29:41           Current Medications     Scheduled Meds:  cefTRIAXone, 2,000 mg, Intravenous, Q24H  heparin (porcine), 5,000 Units, Subcutaneous, Q8H  senna-docusate sodium, 2 tablet, Oral, BID  sodium chloride, 10 mL, Intravenous, Q12H         [facility]  11/15/23   23:23 EST      Electronically signed by Jocy Donaldson MD at 11/16/23 0628          Emergency Department Notes        Roxana Lujan RN at 11/15/23 2301          Pt transported to [facility] via ED [facility]     Electronically signed by Roxana Lujan RN at 11/16/23 0112       Braulio Alexander MD at 11/15/23 2022          Subjective  "  History of Present Illness  63-year-old female presents with family stating she had some altered mental status over the last few days.  States she had fallen a week ago.  Otherwise they report no new trauma she denies any pain she states she has no headache or chest pain or abdominal pain she had no vomiting or diarrhea.  She states she has been eating and drinking normally.  She reports no fevers or chills.  She reports no shortness of breath.  There is no reported focal numbness or weakness.  She denies any unusual ingestions and is currently on no medications.   states she has been talking confused and not able to put together thoughts coherently.  Review of Systems    No past medical history on file.  Transient global amnesia, multiple sclerosis  Allergies   Allergen Reactions    Sulfa Antibiotics Hives       Past Surgical History:   Procedure Laterality Date    COLONOSCOPY      TONSILLECTOMY      WISDOM TOOTH EXTRACTION         Family History   Problem Relation Age of Onset    Hypertension Mother     Heart disease Mother         A-fib    Other Father         Alzheimers, dementia    Asthma Brother     No Known Problems Daughter     Asthma Son     Other Son         sleep apnea, narcelpsy       Social History     Socioeconomic History    Marital status:    Tobacco Use    Smoking status: Never    Smokeless tobacco: Never   Substance and Sexual Activity    Alcohol use: Yes    Drug use: Never    Sexual activity: Defer     Prior to Admission medications    Medication Sig Start Date End Date Taking? Authorizing Provider   Multiple Vitamins-Minerals (MULTI FOR HER) pack 1 tablet Daily. 7/11/16   Provider, MD vAa     /77   Pulse 87   Temp 98.3 °F (36.8 °C) (Oral)   Resp 18   Ht 162.6 cm (64\")   SpO2 97%   BMI 55.24 kg/m²         Objective   Physical Exam  General: Obese female in no acute distress, awake and alert  Eyes: Pupils midsized and equal, sclera nonicteric  HEENT: Mucous " membranes somewhat dry, no mucosal swelling  Neck: Supple, no nuchal rigidity, no JVD  Respirations: Respirations nonlabored, equal breath sounds bilaterally, clear lungs  Heart regular rate and rhythm, no murmurs rubs or gallops,   Abdomen soft nontender nondistended, no hepatosplenomegaly, no hernia, no mass, normal bowel sounds, no CVA tenderness  Extremities no clubbing cyanosis or edema, calves are symmetric and nontender  Neuro cranial nerves II through XII intact , normal sensory/motor function and strength in four extremities, no slurred speech, no facial droop, normal finger to nose,  no nuchal rigidity  Psych oriented to person and place, cooperative; somewhat slow to respond  Skin no rash, brisk cap refill  Procedures          ED Course  ED Course as of 11/15/23 2220   Wed Nov 15, 2023   2209 Case and findings discussed with Rosemarie with the hospitalist service for admission. [SH]      ED Course User Index  [SH] Braulio Alexander MD      Results for orders placed or performed during the hospital encounter of 11/15/23   Comprehensive Metabolic Panel    Specimen: Blood   Result Value Ref Range    Glucose 106 (H) 65 - 99 mg/dL    BUN 9 8 - 23 mg/dL    Creatinine 0.71 0.57 - 1.00 mg/dL    Sodium 134 (L) 136 - 145 mmol/L    Potassium 3.5 3.5 - 5.2 mmol/L    Chloride 95 (L) 98 - 107 mmol/L    CO2 27.0 22.0 - 29.0 mmol/L    Calcium 8.5 (L) 8.6 - 10.5 mg/dL    Total Protein 6.7 6.0 - 8.5 g/dL    Albumin 3.4 (L) 3.5 - 5.2 g/dL    ALT (SGPT) 19 1 - 33 U/L    AST (SGOT) 28 1 - 32 U/L    Alkaline Phosphatase 56 39 - 117 U/L    Total Bilirubin 0.7 0.0 - 1.2 mg/dL    Globulin 3.3 gm/dL    A/G Ratio 1.0 g/dL    BUN/Creatinine Ratio 12.7 7.0 - 25.0    Anion Gap 12.0 5.0 - 15.0 mmol/L    eGFR 94.5 >60.0 mL/min/1.73   Urinalysis With Culture If Indicated - Straight Cath    Specimen: Straight Cath; Urine   Result Value Ref Range    Color, UA Dark Yellow (A) Yellow, Straw    Appearance, UA Turbid (A) Clear    pH, UA 7.0 5.0  - 8.0    Specific Gravity, UA 1.017 1.005 - 1.030    Glucose, UA Negative Negative    Ketones, UA 15 mg/dL (1+) (A) Negative    Bilirubin, UA Small (1+) (A) Negative    Blood, UA Moderate (2+) (A) Negative    Protein,  mg/dL (2+) (A) Negative    Leuk Esterase, UA Large (3+) (A) Negative    Nitrite, UA Positive (A) Negative    Urobilinogen, UA 2.0 E.U./dL (A) 0.2 - 1.0 E.U./dL   Ethanol    Specimen: Blood   Result Value Ref Range    Ethanol % <0.010 %   Urine Drug Screen - Straight Cath    Specimen: Straight Cath; Urine   Result Value Ref Range    Amphet/Methamphet, Screen Negative Negative    Barbiturates Screen, Urine Negative Negative    Benzodiazepine Screen, Urine Negative Negative    Cocaine Screen, Urine Negative Negative    Opiate Screen Negative Negative    THC, Screen, Urine Negative Negative    Methadone Screen, Urine Negative Negative    Oxycodone Screen, Urine Negative Negative   TSH    Specimen: Blood   Result Value Ref Range    TSH 1.400 0.270 - 4.200 uIU/mL   Magnesium    Specimen: Blood   Result Value Ref Range    Magnesium 2.1 1.6 - 2.4 mg/dL   Ammonia    Specimen: Blood   Result Value Ref Range    Ammonia 11 11 - 51 umol/L   Carbon Monoxide, Blood    Specimen: Blood   Result Value Ref Range    Carbon Monoxide, Blood 3.2 (H) 0 - 3 %   CBC Auto Differential    Specimen: Blood   Result Value Ref Range    WBC 7.40 3.40 - 10.80 10*3/mm3    RBC 4.76 3.77 - 5.28 10*6/mm3    Hemoglobin 13.5 12.0 - 15.9 g/dL    Hematocrit 40.9 34.0 - 46.6 %    MCV 86.0 79.0 - 97.0 fL    MCH 28.5 26.6 - 33.0 pg    MCHC 33.1 31.5 - 35.7 g/dL    RDW 14.4 12.3 - 15.4 %    RDW-SD 43.3 37.0 - 54.0 fl    MPV 7.8 6.0 - 12.0 fL    Platelets 313 140 - 450 10*3/mm3    Neutrophil % 65.9 42.7 - 76.0 %    Lymphocyte % 23.0 19.6 - 45.3 %    Monocyte % 9.6 5.0 - 12.0 %    Eosinophil % 0.6 0.3 - 6.2 %    Basophil % 0.9 0.0 - 1.5 %    Neutrophils, Absolute 4.90 1.70 - 7.00 10*3/mm3    Lymphocytes, Absolute 1.70 0.70 - 3.10 10*3/mm3     Monocytes, Absolute 0.70 0.10 - 0.90 10*3/mm3    Eosinophils, Absolute 0.00 0.00 - 0.40 10*3/mm3    Basophils, Absolute 0.10 0.00 - 0.20 10*3/mm3    nRBC 0.1 0.0 - 0.2 /100 WBC   Urinalysis, Microscopic Only - Straight Cath    Specimen: Straight Cath; Urine   Result Value Ref Range    RBC, UA 11-20 (A) None Seen, 0-2 /HPF    WBC, UA Too Numerous to Count (A) None Seen, 0-2 /HPF    Bacteria, UA 4+ (A) None Seen /HPF    Squamous Epithelial Cells, UA 3-6 (A) None Seen, 0-2 /HPF    Hyaline Casts, UA 7-12 None Seen /LPF    Mucus, UA Small/1+ (A) None Seen, Trace /HPF    Methodology Manual Light Microscopy    ECG 12 Lead Altered Mental Status   Result Value Ref Range    QT Interval 345 ms    QTC Interval 430 ms   Light Blue Top   Result Value Ref Range    Extra Tube Hold for add-ons.                                           Medical Decision Making  Patient presents with reported altered mental status differential diagnosis including CVA, seizure, toxic exposure, sepsis,    Patient has no focal deficits to suggest CVA.  She is not a candidate for tPA consideration as the symptoms have been ongoing for the last few days.  She was found to have urinary tract infection and was ordered IV Rocephin.  Blood cultures pending.  She was hemodynamically stable.  She is in no respiratory distress.  Drug screen negative, alcohol negative, comprehensive metabolic panel shows some mild hyponatremia, TSH normal, ammonia normal    Patient and family advised of findings.  She was ordered IV antibiotics and IV fluids and will be admitted for further care.  Patient agreeable to plan.  Hospitalist service paged for admission.    Problems Addressed:  Confusion: complicated acute illness or injury  Urinary tract infection without hematuria, site unspecified: complicated acute illness or injury    Amount and/or Complexity of Data Reviewed  Labs: ordered. Decision-making details documented in ED Course.  Radiology: ordered and independent  interpretation performed.     Details: My independent interpretation of CT head image no apparent acute hemorrhage, pending radiology report  ECG/medicine tests: ordered and independent interpretation performed.     Details: My independent interpretation of EKG sinus rhythm rate of 93, no acute ST or T wave abnormality    Risk  Prescription drug management.  Decision regarding hospitalization.        Final diagnoses:   Urinary tract infection without hematuria, site unspecified   Confusion       ED Disposition  ED Disposition       ED Disposition   Decision to Admit    Condition   --    Comment   Level of Care: Telemetry [5]   Diagnosis: Acute metabolic encephalopathy [9528134]   Admitting Physician: CELESTINA FRANCIS [[id number]]   Attending Physician: CELESTINA FRANCIS [[id number]]   Certification: I Certify That Inpatient Hospital Services Are Medically Necessary For Greater Than 2 Midnights                 No follow-up provider specified.       Medication List      No changes were made to your prescriptions during this visit.            Braulio Alexander MD  11/15/23 2220      Electronically signed by Braulio Alexander MD at 11/15/23 2220       Delma Morgan LPN at 11/15/23 2021          Pt and family reports increased confusion the past few days, with difficulty putting sentences together. Pt reports difficulty thinking and putting sentences together. Pt is A&Ox4. Pts family reports past hx of UTI's but never has affected her like this.    Electronically signed by Delma Morgan LPN at 11/15/23 2024       Vital Signs (last day)       Date/Time Temp Temp src Pulse Resp BP Patient Position SpO2    11/16/23 0736 98.4 (36.9) Oral 81 15 140/56 Sitting 95    11/16/23 0432 98.4 (36.9) Oral 92 14 149/65 Lying 95    11/16/23 0132 -- -- 99 -- 129/88 -- 91    11/16/23 0117 -- -- 92 -- 162/86 -- 93    11/16/23 0047 -- -- 93 -- 155/64 -- 95    11/16/23 0033 -- -- 93 -- 136/58 -- 95    11/16/23 0002 -- -- 92 -- 134/60 -- 89     11/15/23 2347 -- -- 90 -- 143/60 -- 96    11/15/23 2332 -- -- 90 -- 135/69 -- 96    11/15/23 2312 99.5 (37.5) Axillary 93 15 147/66 Lying 97    11/15/23 2247 -- -- 88 -- 137/67 -- 96    11/15/23 2232 -- -- 85 -- 142/65 -- 98    11/15/23 2133 -- -- 87 -- 151/77 -- 97    11/15/23 2047 -- -- 89 -- 136/110 -- 97    11/15/23 2003 -- -- -- -- 167/78 -- --    11/15/23 2002 98.3 (36.8) Oral 100 18 -- Sitting 96          Oxygen Therapy (last day)       Date/Time SpO2 Device (Oxygen Therapy) Flow (L/min) Oxygen Concentration (%) ETCO2 (mmHg)    11/16/23 0736 95 room air -- -- --    11/16/23 0432 95 room air -- -- --    11/16/23 0358 -- room air -- -- --    11/16/23 0132 91 -- -- -- --    11/16/23 0117 93 -- -- -- --    11/16/23 0047 95 -- -- -- --    11/16/23 0033 95 -- -- -- --    11/16/23 0002 89 -- -- -- --    11/15/23 2347 96 -- -- -- --    11/15/23 2332 96 -- -- -- --    11/15/23 2330 -- room air -- -- --    11/15/23 2312 97 room air -- -- --    11/15/23 2247 96 -- -- -- --    11/15/23 2232 98 -- -- -- --    11/15/23 2133 97 -- -- -- --    11/15/23 2047 97 -- -- -- --    11/15/23 2002 96 -- -- -- --          Facility-Administered Medications as of 11/15/2023   Medication Dose Route Frequency Provider Last Rate Last Admin    acetaminophen (TYLENOL) 160 MG/5ML oral solution 650 mg  650 mg Oral Q4H PRN Rosemarie Garcia APRN        sennosides-docusate (PERICOLACE) 8.6-50 MG per tablet 2 tablet  2 tablet Oral BID Rosemarie Garcia APRN        And    polyethylene glycol (MIRALAX) packet 17 g  17 g Oral Daily PRN Rosemarie Garcia APRN        And    bisacodyl (DULCOLAX) EC tablet 5 mg  5 mg Oral Daily PRN Rosemarie Garcia APRN        And    bisacodyl (DULCOLAX) suppository 10 mg  10 mg Rectal Daily PRN Rosemarie Garcia APRN        [COMPLETED] cefTRIAXone (ROCEPHIN) 2,000 mg in sodium chloride 0.9 % 100 mL IVPB  2,000 mg Intravenous Once Braulio Alexander MD   Stopped at 11/15/23 4977    cefTRIAXone (ROCEPHIN) 2,000 mg in  sodium chloride 0.9 % 100 mL IVPB  2,000 mg Intravenous Q24H Rosemarie Garcia APRN        heparin (porcine) 5000 UNIT/ML injection 5,000 Units  5,000 Units Subcutaneous Q8H Rosemarie Garcia APRN        nitroglycerin (NITROSTAT) SL tablet 0.4 mg  0.4 mg Sublingual Q5 Min PRN Rosemarie Garcia APRN        ondansetron (ZOFRAN) injection 4 mg  4 mg Intravenous Q6H PRN Rosemarie Garcia APRN        [COMPLETED] sodium chloride 0.9 % bolus 500 mL  500 mL Intravenous Once Braulio Alexander MD   Stopped at 11/16/23 0019    sodium chloride 0.9 % flush 10 mL  10 mL Intravenous PRN Braulio Alexander MD        sodium chloride 0.9 % flush 10 mL  10 mL Intravenous Q12H Rosemarie Garcia APRN        sodium chloride 0.9 % flush 10 mL  10 mL Intravenous PRN Rosemarie Garcia APRN        sodium chloride 0.9 % infusion 40 mL  40 mL Intravenous PRN Rosemarie Garcia APRN         Lab Results (last 24 hours)       Procedure Component Value Units Date/Time    Blood Culture - Blood, Arm, Left [[id number]] Collected: 11/15/23 2222    Specimen: Blood from Arm, Left Updated: 11/15/23 2244    Blood Culture - Blood, Arm, Right [[id number]] Collected: 11/15/23 2233    Specimen: Blood from Arm, Right Updated: 11/15/23 2244    POC Lactate [[id number]]  (Normal) Collected: 11/15/23 2228    Specimen: Blood Updated: 11/15/23 2229     Lactate 1.3 mmol/L      Comment: Serial Number: [id number]:  [id number]       Extra Tubes [[id number]] Collected: 11/15/23 2103    Specimen: Blood, Venous Line Updated: 11/15/23 2215    Narrative:      The following orders were created for panel order Extra Tubes.  Procedure                               Abnormality         Status                     ---------                               -----------         ------                     Gold Top - [facility][[id number]]                                   Final result               Light Blue Top[[id number]]                                   Final result                 Please  view results for these tests on the individual orders.    Light Blue Top [[id number]] Collected: 11/15/23 2103    Specimen: Blood Updated: 11/15/23 2215     Extra Tube Hold for add-ons.     Comment: Auto resulted       Gold Top - [facility] [[id number]] Collected: 11/15/23 2103    Specimen: Blood Updated: 11/15/23 2202    Urinalysis, Microscopic Only - Straight Cath [[id number]]  (Abnormal) Collected: 11/15/23 2118    Specimen: Urine from Straight Cath Updated: 11/15/23 2151     RBC, UA 11-20 /HPF      WBC, UA Too Numerous to Count /HPF      Bacteria, UA 4+ /HPF      Squamous Epithelial Cells, UA 3-6 /HPF      Hyaline Casts, UA 7-12 /LPF      Mucus, UA Small/1+ /HPF      Methodology Manual Light Microscopy    Urine Culture - Urine, Straight Cath [[id number]] Collected: 11/15/23 2118    Specimen: Urine from Straight Cath Updated: 11/15/23 2151    Urine Drug Screen - Straight Cath [[id number]]  (Normal) Collected: 11/15/23 2118    Specimen: Urine from Straight Cath Updated: 11/15/23 2146     Amphet/Methamphet, Screen Negative     Barbiturates Screen, Urine Negative     Benzodiazepine Screen, Urine Negative     Cocaine Screen, Urine Negative     Opiate Screen Negative     THC, Screen, Urine Negative     Methadone Screen, Urine Negative     Oxycodone Screen, Urine Negative    Narrative:      Negative Thresholds Per Drugs Screened:    Amphetamines                 500 ng/ml  Barbiturates                 200 ng/ml  Benzodiazepines              100 ng/ml  Cocaine                      300 ng/ml  Methadone                    300 ng/ml  Opiates                      300 ng/ml  Oxycodone                    100 ng/ml  THC                           50 ng/ml    The Normal Value for all drugs tested is negative. This report includes final unconfirmed screening results to be used for medical treatment purposes only. Unconfirmed results must not be used for non-medical purposes such as employment or legal testing. Clinical consideration should be  applied to any drug of abuse test, particularly when unconfirmed results are used.          All urine drugs of abuse requests without chain of custody are for medical screening purposes only.  False positives are possible.      TSH [[id number]]  (Normal) Collected: 11/15/23 2103    Specimen: Blood Updated: 11/15/23 2142     TSH 1.400 uIU/mL     Comprehensive Metabolic Panel [[id number]]  (Abnormal) Collected: 11/15/23 2103    Specimen: Blood Updated: 11/15/23 2136     Glucose 106 mg/dL      BUN 9 mg/dL      Creatinine 0.71 mg/dL      Sodium 134 mmol/L      Potassium 3.5 mmol/L      Chloride 95 mmol/L      CO2 27.0 mmol/L      Calcium 8.5 mg/dL      Total Protein 6.7 g/dL      Albumin 3.4 g/dL      ALT (SGPT) 19 U/L      AST (SGOT) 28 U/L      Alkaline Phosphatase 56 U/L      Total Bilirubin 0.7 mg/dL      Globulin 3.3 gm/dL      A/G Ratio 1.0 g/dL      BUN/Creatinine Ratio 12.7     Anion Gap 12.0 mmol/L      eGFR 94.5 mL/min/1.73     Narrative:      GFR Normal >60  Chronic Kidney Disease <60  Kidney Failure <15      Ethanol [[id number]] Collected: 11/15/23 2103    Specimen: Blood Updated: 11/15/23 2136     Ethanol % <0.010 %     Narrative:      Plasma Ethanol Clinical Symptoms:    ETOH (%)               Clinical Symptom  .01-.05              No apparent influence  .03-.12              Euphoria, Diminished judgment and attention   .09-.25              Impaired comprehension, Muscle incoordination  .18-.30              Confusion, Staggered gait, Slurred speech  .25-.40              Markedly decreased response to stimuli, unable to stand or                        walk, vomitting, sleep or stupor  .35-.50              Comatose, Anesthesia, Subnormal body temperature        Magnesium [[id number]]  (Normal) Collected: 11/15/23 2103    Specimen: Blood Updated: 11/15/23 2136     Magnesium 2.1 mg/dL     Urinalysis With Culture If Indicated - Straight Cath [[id number]]  (Abnormal) Collected: 11/15/23 2118    Specimen: Urine from  Straight Cath Updated: 11/15/23 2130     Color, UA Dark Yellow     Appearance, UA Turbid     pH, UA 7.0     Specific Gravity, UA 1.017     Glucose, UA Negative     Ketones, UA 15 mg/dL (1+)     Bilirubin, UA Small (1+)     Comment: Confirmation testing is unavailable.  A serum bilirubin is recommended for further assessment.        Blood, UA Moderate (2+)     Protein,  mg/dL (2+)     Leuk Esterase, UA Large (3+)     Nitrite, UA Positive     Urobilinogen, UA 2.0 E.U./dL    Narrative:      In absence of clinical symptoms, the presence of pyuria, bacteria, and/or nitrites on the urinalysis result does not correlate with infection.    Ammonia [[id number]]  (Normal) Collected: 11/15/23 2103    Specimen: Blood Updated: 11/15/23 2127     Ammonia 11 umol/L     CBC & Differential [[id number]]  (Normal) Collected: 11/15/23 2103    Specimen: Blood Updated: 11/15/23 2110    Narrative:      The following orders were created for panel order CBC & Differential.  Procedure                               Abnormality         Status                     ---------                               -----------         ------                     CBC Auto Differential[[id number]]        Normal              Final result                 Please view results for these tests on the individual orders.    CBC Auto Differential [[id number]]  (Normal) Collected: 11/15/23 2103    Specimen: Blood Updated: 11/15/23 2110     WBC 7.40 10*3/mm3      RBC 4.76 10*6/mm3      Hemoglobin 13.5 g/dL      Hematocrit 40.9 %      MCV 86.0 fL      MCH 28.5 pg      MCHC 33.1 g/dL      RDW 14.4 %      RDW-SD 43.3 fl      MPV 7.8 fL      Platelets 313 10*3/mm3      Neutrophil % 65.9 %      Lymphocyte % 23.0 %      Monocyte % 9.6 %      Eosinophil % 0.6 %      Basophil % 0.9 %      Neutrophils, Absolute 4.90 10*3/mm3      Lymphocytes, Absolute 1.70 10*3/mm3      Monocytes, Absolute 0.70 10*3/mm3      Eosinophils, Absolute 0.00 10*3/mm3      Basophils, Absolute 0.10  10*3/mm3      nRBC 0.1 /100 WBC     Carbon Monoxide, Blood [[id number]]  (Abnormal) Collected: 11/15/23 2103    Specimen: Blood Updated: 11/15/23 2110     Carbon Monoxide, Blood 3.2 %           Imaging Results (Last 24 Hours)       Procedure Component Value Units Date/Time    CT Head Without Contrast [[id number]] Collected: 11/15/23 2221     Updated: 11/15/23 2224    Narrative:      CT HEAD WO CONTRAST    Date of Exam: 11/15/2023 9:57 PM EST    Indication: confusion.    Comparison: None available.    Technique: Axial CT images were obtained of the head without contrast administration.  Coronal reconstructions were performed.  Automated exposure control and iterative reconstruction methods were used.      Findings:  No hemorrhage, edema, or mass effect. Chronic small vessel ischemic changes present in the periventricular white matter. No abnormal extra-axial fluid collection. No fluid in the visualized paranasal sinuses/middle ear cavities      Impression:      Impression:  No acute intracranial process      Electronically Signed: Marvin Barrera    11/15/2023 10:22 PM EST    Workstation ID: [id number]          Physician Progress Notes (all)    No notes of this type exist for this encounter.       Consult Notes (all)    No notes of this type exist for this encounter.

## 2023-11-16 NOTE — THERAPY EVALUATION
Patient Name: Mackenzie Cornell  : 1960    MRN: [id number]                              Today's Date: 2023       Admit Date: 11/15/2023    Visit Dx:     ICD-10-CM ICD-9-CM   1. Urinary tract infection without hematuria, site unspecified  N39.0 599.0   2. Confusion  R41.0 298.9     Patient Active Problem List   Diagnosis    Multiple sclerosis    Body mass index (BMI) of 50-59.9 in adult    Transient global amnesia    Physical exam    Acute metabolic encephalopathy     Past Medical History:   Diagnosis Date    Transient global amnesia     about 10 years, and resolved within 1 day     Past Surgical History:   Procedure Laterality Date    COLONOSCOPY      TONSILLECTOMY      WISDOM TOOTH EXTRACTION        General Information       Row Name 23 1308          Physical Therapy Time and Intention    Document Type evaluation  -MB     Mode of Treatment physical therapy  -MB       Row Name 23 1308          General Information    Patient Profile Reviewed yes  -MB     Prior Level of Function independent:;all household mobility;community mobility;gait;transfer;ADL's;home management;using stairs;driving  -MB     Existing Precautions/Restrictions fall  -MB     Barriers to Rehab none identified  -MB       Row Name 23 1308          Living Environment    People in Home spouse  -MB       Row Name 23 1308          Home Main Entrance    Number of Stairs, Main Entrance four  -MB     Stair Railings, Main Entrance railings safe and in good condition  -MB       Row Name 23 1308          Stairs Within Home, Primary    Stairs, Within Home, Primary flight to upstairs bedroom  -MB       Row Name 23 1308          Cognition    Orientation Status (Cognition) disoriented to;time;situation  -MB       Row Name 23 1308          Safety Issues, Functional Mobility    Impairments Affecting Function (Mobility) balance;endurance/activity tolerance;strength;cognition  -MB     Cognitive Impairments, Mobility  Safety/Performance attention;insight into deficits/self-awareness;judgment;problem-solving/reasoning;safety precaution awareness  -MB               User Key  (r) = Recorded By, (t) = Taken By, (c) = Cosigned By      Initials Name Provider Type    Arya Turk, PT Physical Therapist                   Mobility       Row Name 11/16/23 1309          Bed Mobility    Bed Mobility bed mobility (all) activities  -MB     All Activities, [place] (Bed Mobility) not tested  -MB     Comment, (Bed Mobility) Began and ended session sitting in recliner  -MB       Row Name 11/16/23 1309          Bed-Chair Transfer    Bed-Chair [place] (Transfers) standby assist;contact guard  -MB     Comment, (Bed-Chair Transfer) 2 SPCs in bilateral hands  -MB       Row Name 11/16/23 1309          Sit-Stand Transfer    Sit-Stand [place] (Transfers) standby assist;contact guard  -MB     Comment, (Sit-Stand Transfer) 2 SPCs in bilateral hands  -MB       Row Name 11/16/23 1309          Gait/Stairs (Locomotion)    [place] Level (Gait) contact guard;minimum assist (75% patient effort)  -MB     Assistive Device (Gait) cane, straight  -MB     Patient was able to Ambulate yes  -MB     Distance in Feet (Gait) 30  -MB     Comment, (Gait/Stairs) 30' CGA>min A with 2 SPCs in bilateral hands  -MB               User Key  (r) = Recorded By, (t) = Taken By, (c) = Cosigned By      Initials Name Provider Type    Arya Turk, PT Physical Therapist                   Obj/Interventions       Row Name 11/16/23 1310          Range of Motion Comprehensive    General Range of Motion no range of motion deficits identified  -MB       Row Name 11/16/23 1310          Strength Comprehensive (MMT)    Comment, General Manual Muscle Testing (MMT) Assessment BLE Strength 2+/5 grossly  -MB       Row Name 11/16/23 1310          Balance    Balance Assessment sitting static balance;sitting dynamic balance;sit to stand dynamic balance;standing static  balance;standing dynamic balance  -MB     Static Sitting Balance independent  -MB     Dynamic Sitting Balance independent  -MB     Position, Sitting Balance supported;sitting in chair  -MB     Sit to Stand Dynamic Balance standby assist  -MB     Static Standing Balance standby assist;contact guard  -MB     Dynamic Standing Balance minimal assist  -MB       Row Name 11/16/23 1310          Sensory Assessment (Somatosensory)    Sensory Assessment (Somatosensory) sensation intact  -MB               User Key  (r) = Recorded By, (t) = Taken By, (c) = Cosigned By      Initials Name Provider Type    Arya Turk, PT Physical Therapist                   Goals/Plan       Row Name 11/16/23 1312          Bed Mobility Goal 1 (PT)    Activity/Assistive Device (Bed Mobility Goal 1, PT) bed mobility activities, all  -MB     [place] Level/Cues Needed (Bed Mobility Goal 1, PT) independent  -MB     Time Frame (Bed Mobility Goal 1, PT) long term goal (LTG);2 weeks  -MB       Row Name 11/16/23 1312          Transfer Goal 1 (PT)    Activity/Assistive Device (Transfer Goal 1, PT) transfers, all;walker, rolling  -MB     [place] Level/Cues Needed (Transfer Goal 1, PT) independent  -MB     Time Frame (Transfer Goal 1, PT) long term goal (LTG);2 weeks  -MB       Row Name 11/16/23 1312          Gait Training Goal 1 (PT)    Activity/Assistive Device (Gait Training Goal 1, PT) gait (walking locomotion);walker, rolling  -MB     [place] Level (Gait Training Goal 1, PT) standby assist  -MB     Distance (Gait Training Goal 1, PT) 100  -MB     Time Frame (Gait Training Goal 1, PT) long term goal (LTG);2 weeks  -MB       Row Name 11/16/23 1312          Stairs Goal 1 (PT)    Activity/Assistive Device (Stairs Goal 1, PT) stairs, all skills  -MB     [place] Level/Cues Needed (Stairs Goal 1, PT) contact guard required  -MB     Number of Stairs (Stairs Goal 1, PT) 4  -MB     Time Frame (Stairs Goal 1, PT) long term goal (LTG);2 weeks   -MB       Row Name 11/16/23 1312          Therapy Assessment/Plan (PT)    Planned Therapy Interventions (PT) balance training;bed mobility training;gait training;home exercise program;neuromuscular re-education;stair training;strengthening;transfer training;patient/family education  -MB               User Key  (r) = Recorded By, (t) = Taken By, (c) = Cosigned By      Initials Name Provider Type    Arya Turk, PT Physical Therapist                   Clinical Impression       Row Name 11/16/23 1311          Pain    Pretreatment Pain Rating 0/10 - no pain  -MB     Posttreatment Pain Rating 0/10 - no pain  -MB       Row Name 11/16/23 1315 11/16/23 1311       Plan of Care Review    Plan of Care Reviewed With -- patient;daughter  -MB    Progress -- improving  -MB    Outcome Evaluation Pt is a 62 y/o F admitted to [facility] on 11/15/23 with complaints of AMS over the past few days with trouble with syntax and aphasia. UA (+) for UTI. She also has a history of MS. Diagnosed this visit with acute metabolic encephalopathy secondary to fall ~1 week ago. She is currently living with spouse in multi-level home with 4 steps to enter and flight of steps to upstairs bedroom. At baseline, she reports being IND with household mobility, community ambulation, and ADLs with 2 SPCs in BLE. This date, pt is disoriented to situation and time. She is sitting up in recliner at time of PT eval. She completes transfers CGA>SBA and amb 30' CGA>min A with SPCs in BUE. She is very unsteady on her feet and exhibits very poor activity tolerance. Significant labored breathing and BLE weakness noted near the of the bout. Pt was educated on usage of RW at d/c for short-term usage to help decrease risk for falls and decrease amount of energy expenditure used for ambulation. Pt is well below baseline functioning at this time and will require skilled PT intervention during stay. She should be able to d/c back home with HHPT at d/c pending progress during  stay. PT will follow during stay.  -MB --      Row Name 11/16/23 1311          Therapy Assessment/Plan (PT)    Rehab Potential (PT) good, to achieve stated therapy goals  -MB     Criteria for Skilled Interventions Met (PT) yes;meets criteria  -MB     Therapy Frequency (PT) 5 times/wk  -MB     Predicted Duration of Therapy Intervention (PT) until d/c  -MB       Row Name 11/16/23 1311          Vital Signs    O2 Delivery Pre Treatment room air  -MB     O2 Delivery Intra Treatment room air  -MB     O2 Delivery Post Treatment room air  -MB     Pre Patient Position Sitting  -MB     Intra Patient Position Standing  -MB     Post Patient Position Sitting  -MB       Row Name 11/16/23 1311          Positioning and Restraints    Pre-Treatment Position sitting in chair/recliner  -MB     Post Treatment Position chair  -MB     In Chair notified nsg;reclined;call light within reach;sitting;encouraged to call for assist;exit alarm on;with family/caregiver  -MB               User Key  (r) = Recorded By, (t) = Taken By, (c) = Cosigned By      Initials Name Provider Type    Arya Turk, PT Physical Therapist                   Outcome Measures       Row Name 11/16/23 1312 11/16/23 0800       How much help from another person do you currently need...    Turning from your back to your side while in flat bed without using bedrails? 4  -MB 4  -BF    Moving from lying on back to sitting on the side of a flat bed without bedrails? 4  -MB 4  -BF    Moving to and from a bed to a chair (including a wheelchair)? 3  -MB 3  -BF    Standing up from a chair using your arms (e.g., wheelchair, bedside chair)? 4  -MB 4  -BF    Climbing 3-5 steps with a railing? 2  -MB 2  -BF    To walk in hospital room? 3  -MB 3  -BF    AM-PAC 6 Clicks Score (PT) 20  -MB 20  -BF    Highest Level of Mobility Goal 6 --> Walk 10 steps or more  -MB 6 --> Walk 10 steps or more  -BF              User Key  (r) = Recorded By, (t) = Taken By, (c) = Cosigned By      Initials  Name Provider Type    BF Katia Montez, RN Registered Nurse    Arya Turk, PT Physical Therapist                                 Physical Therapy Education       Title: PT OT SLP Therapies (Done)       Topic: Physical Therapy (Done)       Point: Mobility training (Done)       Learning Progress Summary             Patient Acceptance, E,TB, VU by MB at 11/16/2023 1313                         Point: Body mechanics (Done)       Learning Progress Summary             Patient Acceptance, E,TB, VU by MB at 11/16/2023 1313                         Point: Precautions (Done)       Learning Progress Summary             Patient Acceptance, E,TB, VU by MB at 11/16/2023 1313                                         User Key       Initials Effective Dates Name Provider Type Discipline    MB 06/06/23 -  Arya Serna, PT Physical Therapist PT                  PT Recommendation and Plan  Planned Therapy Interventions (PT): balance training, bed mobility training, gait training, home exercise program, neuromuscular re-education, stair training, strengthening, transfer training, patient/family education  Plan of Care Reviewed With: patient, daughter  Progress: improving  Outcome Evaluation: Pt is a 62 y/o F admitted to [facility] on 11/15/23 with complaints of AMS over the past few days with trouble with syntax and aphasia. UA (+) for UTI. She also has a history of MS. Diagnosed this visit with acute metabolic encephalopathy secondary to fall ~1 week ago. She is currently living with spouse in multi-level home with 4 steps to enter and flight of steps to upstairs bedroom. At baseline, she reports being IND with household mobility, community ambulation, and ADLs with 2 SPCs in BLE. This date, pt is disoriented to situation and time. She is sitting up in recliner at time of PT eval. She completes transfers CGA>SBA and amb 30' CGA>min A with SPCs in BUE. She is very unsteady on her feet and exhibits very poor activity tolerance. Significant  labored breathing and BLE weakness noted near the of the bout. Pt was educated on usage of RW at d/c for short-term usage to help decrease risk for falls and decrease amount of energy expenditure used for ambulation. Pt is well below baseline functioning at this time and will require skilled PT intervention during stay. She should be able to d/c back home with HHPT at d/c pending progress during stay. PT will follow during stay.     Time Calculation:   PT Evaluation Complexity  History, PT Evaluation Complexity: 1-2 personal factors and/or comorbidities  Examination of Body Systems (PT Eval Complexity): total of 3 or more elements  Clinical Presentation (PT Evaluation Complexity): evolving  Clinical Decision Making (PT Evaluation Complexity): moderate complexity  Overall Complexity (PT Evaluation Complexity): moderate complexity     PT Charges       Row Name 11/16/23 1313             Time Calculation    Start Time 1028  -MB      Stop Time 1051  -MB      Time Calculation (min) 23 min  -MB      PT Received On 11/16/23  -MB      PT - Next Appointment 11/17/23  -MB      PT Goal Re-Cert Due Date 11/30/23  -MB         Time Calculation- PT    Total Timed Code Minutes- PT 0 minute(s)  -MB                User Key  (r) = Recorded By, (t) = Taken By, (c) = Cosigned By      Initials Name Provider Type    Arya Turk, PT Physical Therapist                  Therapy Charges for Today       Code Description Service Date Service Provider Modifiers Qty    94420216957 HC PT EVAL MOD COMPLEXITY 4 11/16/2023 Arya Serna, PT GP 1            PT G-Codes  AM-PAC 6 Clicks Score (PT): 20  PT Discharge Summary  Anticipated Discharge Disposition (PT): home with home health    Arya Serna PT  11/16/2023

## 2023-11-16 NOTE — PROGRESS NOTES
[facility] MEDICINE SERVICE  DAILY PROGRESS NOTE    NAME: Mackenzie Cornell  : 1960  MRN: [id number]      LOS: 1 day     PROVIDER OF SERVICE: Roberth Dobbins MD    Chief Complaint: Acute metabolic encephalopathy    Subjective:     Interval History:  History taken from: RN    Periods of lucidity improving.  Still having frequent episodes of confusion though.    Review of Systems:   Review of Systems   Unable to perform ROS: Mental status change       Objective:     Vital Signs  Temp:  [97.7 °F (36.5 °C)-99.5 °F (37.5 °C)] 97.7 °F (36.5 °C)  Heart Rate:  [] 85  Resp:  [14-18] 16  BP: (129-167)/() 157/76   Body mass index is 55.24 kg/m².    Physical Exam  Physical Exam  Constitutional:       General: She is not in acute distress.  HENT:      Head: Normocephalic and atraumatic.      Right Ear: External ear normal.      Left Ear: External ear normal.      Nose: Nose normal.   Pulmonary:      Effort: No respiratory distress.   Musculoskeletal:         General: No deformity.      Cervical back: Normal range of motion.   Skin:     Coloration: Skin is not jaundiced.      Findings: No erythema.   Neurological:      Mental Status: She is alert.   Psychiatric:         Mood and Affect: Mood normal.         Behavior: Behavior normal.         Scheduled Meds   cefTRIAXone, 2,000 mg, Intravenous, Q24H  heparin (porcine), 5,000 Units, Subcutaneous, Q8H  senna-docusate sodium, 2 tablet, Oral, BID  sodium chloride, 10 mL, Intravenous, Q12H       PRN Meds     acetaminophen    senna-docusate sodium **AND** polyethylene glycol **AND** bisacodyl **AND** bisacodyl    nitroglycerin    ondansetron    [COMPLETED] Insert Peripheral IV **AND** sodium chloride    sodium chloride    sodium chloride   Infusions         Diagnostic Data    Results from last 7 days   Lab Units 23  1607   WBC 10*3/mm3 7.10   HEMOGLOBIN g/dL 13.3   HEMATOCRIT % 40.5   PLATELETS 10*3/mm3 318   GLUCOSE mg/dL 135*   CREATININE mg/dL 0.66   BUN  mg/dL 10   SODIUM mmol/L 135*   POTASSIUM mmol/L 3.1*   AST (SGOT) U/L 27   ALT (SGPT) U/L 21   ALK PHOS U/L 58   BILIRUBIN mg/dL 0.5   ANION GAP mmol/L 11.0       CT Head Without Contrast    Result Date: 11/15/2023  Impression: No acute intracranial process Electronically Signed: Marvin Barrera  11/15/2023 10:22 PM EST  Workstation ID: [id number]       I reviewed the patient's new clinical results.    Assessment/Plan:     Active and Resolved Problems  Active Hospital Problems    Diagnosis  POA    **Acute metabolic encephalopathy [G93.41]  Yes      Resolved Hospital Problems   No resolved problems to display.       Acute metabolic encephalopathy  UTI  So far mental status is trending towards improvement with treatment of UTI.  - Continue IV rocephin     Multiple Sclerosis   - on no treatment  - PT/OT evaluation     Obesity  - encourage diet and lifestyle changes  -BMI 55    DVT prophylaxis:  Medical DVT prophylaxis orders are present.     Code status is   Code Status and Medical Interventions:   Ordered at: 11/15/23 9931     Code Status (Patient has no pulse and is not breathing):    CPR (Attempt to Resuscitate)     Medical Interventions (Patient has pulse or is breathing):    Full Support       Plan for disposition:TBD in 1-2 days    Time: 30 minutes    Signature: Electronically signed by Roberth Dobbins MD, 11/16/23, 18:34 EST.  [facility] Hospitalist Team

## 2023-11-16 NOTE — H&P
[facility] Medicine Services    Hospitalist History and Physical     Mackenzie Cornell : 1960 MRN:[id number] LOS:0 ROOM: [facility]     Reason for admission: Acute metabolic encephalopathy     Assessment / Plan     Acute metabolic encephalopathy  - spouse reports pt fell 1 week ago and has had progressive confusion  - likely 2/2 UTI  - urinalysis dark yellow, turbid, positive nitrite, large blood, large leukocytes, too numerous to count WBC and 4+ bacteria  - follow culture C&S   - CT head per radiology negative for acute intracranial abnormality  - rocephin 2G started in ED and will continue  - gentle IVF hydration  - culture C&S pending  - PT/OT eval     Multiple Sclerosis   - on no treatment  - PT/OT evaluation    Obesity  - encourage diet and lifestyle changes  -BMI 55    Code Status (Patient has no pulse and is not breathing): CPR (Attempt to Resuscitate)  Medical Interventions (Patient has pulse or is breathing): Full Support       Nutrition:   Diet: Regular/House Diet; Texture: Regular Texture (IDDSI 7); Fluid Consistency: Thin (IDDSI 0)     DVT prophylaxis:  Medical DVT prophylaxis orders are present.     History of Present illness     Mackenzie Cornell is a 63 y.o. female with PMH of multiple sclerosis and obesity presented to the hospital for confusion, and was admitted with a principal diagnosis of Acute metabolic encephalopathy.     Per spouse report at bedside patient sustained a fall last week and had difficulty with standing.  He reports he had noticed that mentation had been altered and was getting progressively worse with difficulty with word finding and slow to respond.  She reports over the past 4 to 5 days she has been more fatigued chilled and generalized body aches.  She denies any chest pain nausea vomiting or dysuria    ED course: Patient was negative for any focal deficits.  CT of the head was negative for any acute intracranial abnormality.  Vital signs temperature max 99.5.  Pulse  between 85 and 100.  Blood pressure 147/66.  O2 stable on room air.  Lactic acid 1.3.  Mild hyponatremia with sodium 134.  Negative for leukocytosis.  Hemoglobin stable.  Urinalysis dark yellow turbid positive for nitrite moderate blood 3+ too numerous to count white blood cells and 4+ bacteria.  She started on IV Rocephin.  Admitted for further treatment    Patient was seen and examined on 11/15/23 at 23:23 EST .    Subjective / Review of systems     Review of Systems   Constitutional:  Positive for fatigue.   Neurological:  Positive for weakness.        Confusion, difficulty with word find   All other systems reviewed and are negative.         Past Medical/Surgical/Social/Family History & Allergies     No past medical history on file.   Past Surgical History:   Procedure Laterality Date    COLONOSCOPY      TONSILLECTOMY      WISDOM TOOTH EXTRACTION        Social History     Socioeconomic History    Marital status:    Tobacco Use    Smoking status: Never    Smokeless tobacco: Never   Substance and Sexual Activity    Alcohol use: Yes    Drug use: Never    Sexual activity: Defer      Family History   Problem Relation Age of Onset    Hypertension Mother     Heart disease Mother         A-fib    Other Father         Alzheimers, dementia    Asthma Brother     No Known Problems Daughter     Asthma Son     Other Son         sleep apnea, narcelpsy      Allergies   Allergen Reactions    Sulfa Antibiotics Hives      Social Determinants of Health     Tobacco Use: Not on file   Alcohol Use: Not on file   Financial Resource Strain: Not on file   Food Insecurity: Not on file   Transportation Needs: Not on file   Physical Activity: Not on file   Stress: Not on file   Social Connections: Unknown (10/12/2023)    Family and Community Support     Help with Day-to-Day Activities: Not on file     Lonely or Isolated: Not on file   Interpersonal Safety: Not At Risk (11/15/2023)    Abuse Screen     Unsafe at Home or Work/School: no      Feels Threatened by Someone?: no     Does Anyone Keep You from Contacting Others or Doint Things Outside the Home?: no     Physical Sign of Abuse Present: no   Depression: Not on file   Housing Stability: Unknown (10/12/2023)    Housing Stability     Current Living Arrangements: Not on file     Potentially Unsafe Housing Conditions: Not on file   Utilities: Not on file   Health Literacy: Unknown (10/12/2023)    Education     Help with school or training?: Not on file     Preferred Language: Not on file   Employment: Unknown (10/12/2023)    Employment     Do you want help finding or keeping work or a job?: Not on file   Disabilities: Unknown (10/12/2023)    Disabilities     Concentrating, Remembering, or Making Decisions Difficulty: Not on file     Doing Errands Independently Difficulty: Not on file        Home Medications     Prior to Admission medications    Medication Sig Start Date End Date Taking? Authorizing Provider   Multiple Vitamins-Minerals (MULTI FOR HER) pack 1 tablet Daily. 7/11/16   Provider, MD Ava        Objective / Physical Exam     Vital signs:  Temp: 99.5 °F (37.5 °C)  BP: 147/66  Heart Rate: 93  Resp: 15  SpO2: 97 %    Admission Weight:      Physical Exam  Constitutional:       Comments: Pt alert and oriented but movement and speech appear delayed. Difficulty with word find at times   Eyes:      Pupils: Pupils are equal, round, and reactive to light.   Cardiovascular:      Rate and Rhythm: Normal rate and regular rhythm.   Pulmonary:      Effort: Pulmonary effort is normal.      Breath sounds: Normal breath sounds.   Abdominal:      Palpations: Abdomen is soft.      Comments: Obese abdomen    Skin:     Comments: Hyperkeratotic plaquing bilateral lower extremities   Neurological:      General: No focal deficit present.            Labs     Results from last 7 days   Lab Units 11/15/23  2103   WBC 10*3/mm3 7.40   HEMOGLOBIN g/dL 13.5   HEMATOCRIT % 40.9   PLATELETS 10*3/mm3 313       Results from last 7 days   Lab Units 11/15/23  2103   ALK PHOS U/L 56   AST (SGOT) U/L 28   ALT (SGPT) U/L 19           Results from last 7 days   Lab Units 11/15/23  2103   SODIUM mmol/L 134*   POTASSIUM mmol/L 3.5   CHLORIDE mmol/L 95*   CO2 mmol/L 27.0   BUN mg/dL 9   CREATININE mg/dL 0.71   GLUCOSE mg/dL 106*        Imaging     CT Head Without Contrast    Result Date: 11/15/2023  CT HEAD WO CONTRAST Date of Exam: 11/15/2023 9:57 PM EST Indication: confusion. Comparison: None available. Technique: Axial CT images were obtained of the head without contrast administration.  Coronal reconstructions were performed.  Automated exposure control and iterative reconstruction methods were used. Findings: No hemorrhage, edema, or mass effect. Chronic small vessel ischemic changes present in the periventricular white matter. No abnormal extra-axial fluid collection. No fluid in the visualized paranasal sinuses/middle ear cavities     Impression: No acute intracranial process Electronically Signed: Marvin Barrera  11/15/2023 10:22 PM EST  Workstation ID: [id number]      ECG 12 Lead Altered Mental Status   Preliminary Result   HEART RATE= 93  bpm   RR Interval= 644  ms   [place] Interval= 134  ms   P Horizontal Axis= -19  deg   P Front Axis= 48  deg   QRSD Interval= 86  ms   QT Interval= 345  ms   QTcB= 430  ms   QRS Axis= 53  deg   T Wave Axis= 31  deg   - BORDERLINE ECG -   Sinus rhythm   Probable left atrial enlargement   When compared with ECG of 12-Jun-2016 0:31:26,   No significant change   Electronically Signed By:    Date and Time of Study: 2023-11-15 20:29:41           Current Medications     Scheduled Meds:  cefTRIAXone, 2,000 mg, Intravenous, Q24H  heparin (porcine), 5,000 Units, Subcutaneous, Q8H  senna-docusate sodium, 2 tablet, Oral, BID  sodium chloride, 10 mL, Intravenous, Q12H         Rosemarie Garcia [facility]  11/15/23   23:23 EST

## 2023-11-16 NOTE — THERAPY EVALUATION
Patient Name: Mackenzie Cornell  : 1960    MRN: [id number]                              Today's Date: 2023       Admit Date: 11/15/2023    Visit Dx:     ICD-10-CM ICD-9-CM   1. Urinary tract infection without hematuria, site unspecified  N39.0 599.0   2. Confusion  R41.0 298.9     Patient Active Problem List   Diagnosis    Multiple sclerosis    Body mass index (BMI) of 50-59.9 in adult    Transient global amnesia    Physical exam    Acute metabolic encephalopathy     Past Medical History:   Diagnosis Date    Transient global amnesia     about 10 years, and resolved within 1 day     Past Surgical History:   Procedure Laterality Date    COLONOSCOPY      TONSILLECTOMY      WISDOM TOOTH EXTRACTION        General Information       Row Name 23 1347          OT Time and Intention    Document Type evaluation  -MP     Mode of Treatment occupational therapy  -MP       Row Name 23 1347          General Information    Patient Profile Reviewed yes  -MP     Prior Level of Function independent:;ADL's  -MP     Existing Precautions/Restrictions fall  -MP       Row Name 23 1347          Living Environment    People in Home spouse  -MP       Row Name 23 1347          Cognition    Orientation Status (Cognition) disoriented to;time;situation  -MP       Row Name 23 1347          Safety Issues, Functional Mobility    Impairments Affecting Function (Mobility) balance;endurance/activity tolerance;strength;cognition  -MP               User Key  (r) = Recorded By, (t) = Taken By, (c) = Cosigned By      Initials Name Provider Type    MP Juan Sequeira OT Occupational Therapist                     Mobility/ADL's       Row Name 23 1342          Bed Mobility    Bed Mobility bed mobility (all) activities  -MP     All Activities, [place] (Bed Mobility) not tested  -MP       Row Name 23 1348          Bed-Chair Transfer    Bed-Chair [place] (Transfers) standby assist;contact guard  -MP        Row Name 11/16/23 1348          Sit-Stand Transfer    Sit-Stand [place] (Transfers) standby assist;contact guard  -[facility] Name 11/16/23 1348          Functional Mobility    Functional Mobility- Ind. Level contact guard assist  -[facility] Name 11/16/23 1348          Activities of Daily Living    BADL Assessment/Intervention lower body dressing  -MP       Row Name 11/16/23 1348          Lower Body Dressing Assessment/Training    [place] Level (Lower Body Dressing) lower body dressing skills;moderate assist (50% patient effort)  -MP               User Key  (r) = Recorded By, (t) = Taken By, (c) = Cosigned By      Initials Name Provider Type    Juan Daley OT Occupational Therapist                   Obj/Interventions       [facility] Name 11/16/23 1348          Range of Motion Comprehensive    Comment, General Range of Motion BUE WFL  -MP       [facility] Name 11/16/23 1348          Strength Comprehensive (MMT)    Comment, General Manual Muscle Testing (MMT) Assessment [facility] WFL  -MP       Row Name 11/16/23 1348          Balance    Balance Interventions sitting;standing;sit to stand;supported;static;dynamic  -               User Key  (r) = Recorded By, (t) = Taken By, (c) = Cosigned By      Initials Name Provider Type    Juan Daley OT Occupational Therapist                   Goals/Plan       Row Name 11/16/23 1352          Bed Mobility Goal 1 (OT)    Activity/Assistive Device (Bed Mobility Goal 1, OT) bed mobility activities, all  -MP     [place] Level/Cues Needed (Bed Mobility Goal 1, OT) modified independence  -MP     Time Frame (Bed Mobility Goal 1, OT) long term goal (LTG);2 weeks  -MP       [facility] Name 11/16/23 7330          Transfer Goal 1 (OT)    Activity/Assistive Device (Transfer Goal 1, OT) sit-to-stand/stand-to-sit;toilet  -MP     [place] Level/Cues Needed (Transfer Goal 1, OT) modified independence  -MP     Time Frame (Transfer Goal 1, OT) long term goal (LTG);2 weeks   -MP       Row Name 11/16/23 0155          Dressing Goal 1 (OT)    Activity/Device (Dressing Goal 1, OT) lower body dressing  -MP     Ideal/Cues Needed (Dressing Goal 1, OT) set-up required;supervision required  -MP     Time Frame (Dressing Goal 1, OT) long term goal (LTG);2 weeks  -MP               User Key  (r) = Recorded By, (t) = Taken By, (c) = Cosigned By      Initials Name Provider Type    MP Juan Sequeira, [place] Occupational Therapist                   Clinical Impression       Row Name 11/16/23 2825          Pain Assessment    Pretreatment Pain Rating 0/10 - no pain  -MP     Posttreatment Pain Rating 0/10 - no pain  -MP       Row Name 11/16/23 1342          Plan of Care Review    Plan of Care Reviewed With patient  -MP     Progress no change  -MP     Outcome Evaluation Pt is a 64 y/o F admitted to [facility] on 11/15/23 with complaints of AMS over the past few days with trouble with syntax and aphasia. UA (+) for UTI. She also has a history of MS. Diagnosed this visit with acute metabolic encephalopathy secondary to fall ~1 week ago. Pt. A & O to self and place this date, scores 17/28 on the SBT indidcating severe cognitive impairement. Pt. ambulates w/ CGA Fors afety utilizing bilateral canes, recommend rolling walker for home. Increased assist provided for  LB ADLs this date. ANticipate d/c home w/ 24 hour care/support, will follow up w/ pt. 1-3x per week at [facility].  -MP       Row Name 11/16/23 8136          Therapy Assessment/Plan (OT)    Rehab Potential (OT) good, to achieve stated therapy goals  -MP     Criteria for Skilled Therapeutic Interventions Met (OT) yes;meets criteria;skilled treatment is necessary  -MP     Therapy Frequency (OT) 3 times/wk  -MP       Row Name 11/16/23 1203          Therapy Plan Review/Discharge Plan (OT)    Anticipated Discharge Disposition (OT) home with 24/7 care  -MP       Row Name 11/16/23 7333          Vital Signs    Pre Patient Position Sitting  -MP     Intra Patient  Position Standing  -MP     Post Patient Position Sitting  -MP       Row Name 11/16/23 1349          Positioning and Restraints    Pre-Treatment Position sitting in chair/recliner  -MP     Post Treatment Position chair  -MP     In Chair sitting;call light within reach;encouraged to call for assist;exit alarm on  -MP               User Key  (r) = Recorded By, (t) = Taken By, (c) = Cosigned By      Initials Name Provider Type    MP Juan Sequeira OT Occupational Therapist                   Outcome Measures       Row Name 11/16/23 1312 11/16/23 0800       How much help from another person do you currently need...    Turning from your back to your side while in flat bed without using bedrails? 4  -MB 4  -BF    Moving from lying on back to sitting on the side of a flat bed without bedrails? 4  -MB 4  -BF    Moving to and from a bed to a chair (including a wheelchair)? 3  -MB 3  -BF    Standing up from a chair using your arms (e.g., wheelchair, bedside chair)? 4  -MB 4  -BF    Climbing 3-5 steps with a railing? 2  -MB 2  -BF    To walk in hospital room? 3  -MB 3  -BF    AM-PAC 6 Clicks Score (PT) 20  -MB 20  -BF    Highest Level of Mobility Goal 6 --> Walk 10 steps or more  -MB 6 --> Walk 10 steps or more  -BF              User Key  (r) = Recorded By, (t) = Taken By, (c) = Cosigned By      Initials Name Provider Type    BF Katia Montez, RN Registered Nurse    Arya Turk, PT Physical Therapist                    Occupational Therapy Education       Title: PT OT SLP Therapies (In Progress)       Topic: Occupational Therapy (In Progress)       Point: ADL training (Not Started)       Description:   Instruct learner(s) on proper safety adaptation and remediation techniques during self care or transfers.   Instruct in proper use of assistive devices.                  Learner Progress:  Not documented in this visit.              Point: Home exercise program (Not Started)       Description:   Instruct learner(s) on  appropriate technique for monitoring, assisting and/or progressing therapeutic exercises/activities.                  Learner Progress:  Not documented in this visit.              Point: Precautions (Not Started)       Description:   Instruct learner(s) on prescribed precautions during self-care and functional transfers.                  Learner Progress:  Not documented in this visit.              Point: Body mechanics (Done)       Description:   Instruct learner(s) on proper positioning and spine alignment during self-care, functional mobility activities and/or exercises.                  Learning Progress Summary             Patient Acceptance, E,TB, VU by  at 11/16/2023 1354                                         User Key       Initials Effective Dates Name Provider Type Discipline     06/16/21 -  Juan Sequeira OT Occupational Therapist OT                  OT Recommendation and Plan  Therapy Frequency (OT): 3 times/wk  Plan of Care Review  Plan of Care Reviewed With: patient  Progress: no change  Outcome Evaluation: Pt is a 62 y/o F admitted to [facility] on 11/15/23 with complaints of AMS over the past few days with trouble with syntax and aphasia. UA (+) for UTI. She also has a history of MS. Diagnosed this visit with acute metabolic encephalopathy secondary to fall ~1 week ago. Pt. A & O to self and place this date, scores 17/28 on the SBT indidcating severe cognitive impairement. Pt. ambulates w/ CGA Fors afety utilizing bilateral canes, recommend rolling walker for home. Increased assist provided for  LB ADLs this date. ANticipate d/c home w/ 24 hour care/support, will follow up w/ pt. 1-3x per week at [facility].     Time Calculation:         Time Calculation- OT       Row Name 11/16/23 6794             Time Calculation- OT    OT Start Time 1028  -      OT Stop Time 1051  -      OT Time Calculation (min) 23 min  -      Total Timed Code Minutes- OT 0 minute(s)  -      OT Received On 11/16/23  -CL       OT - Next Appointment 11/17/23  -CL      OT Goal Re-Cert Due Date 11/30/23  -CL                User Key  (r) = Recorded By, (t) = Taken By, (c) = Cosigned By      Initials Name Provider Type    Juan Daley OT Occupational Therapist                  Therapy Charges for Today       Code Description Service Date Service Provider Modifiers Qty    [id number] HC OT EVAL LOW COMPLEXITY 4 11/16/2023 Juan Sequeira OT GO 1                 Juan Sequeira OT  11/16/2023

## 2023-11-17 LAB
ALBUMIN SERPL-MCNC: 3.5 G/DL (ref 3.5–5.2)
ALBUMIN/GLOB SERPL: 0.9 G/DL
ALP SERPL-CCNC: 56 U/L (ref 39–117)
ALT SERPL W P-5'-P-CCNC: 22 U/L (ref 1–33)
ANION GAP SERPL CALCULATED.3IONS-SCNC: 11 MMOL/L (ref 5–15)
AST SERPL-CCNC: 27 U/L (ref 1–32)
BACTERIA SPEC AEROBE CULT: ABNORMAL
BILIRUB SERPL-MCNC: 0.3 MG/DL (ref 0–1.2)
BUN SERPL-MCNC: 6 MG/DL (ref 8–23)
BUN/CREAT SERPL: 10.5 (ref 7–25)
CALCIUM SPEC-SCNC: 8.9 MG/DL (ref 8.6–10.5)
CHLORIDE SERPL-SCNC: 102 MMOL/L (ref 98–107)
CO2 SERPL-SCNC: 27 MMOL/L (ref 22–29)
CREAT SERPL-MCNC: 0.57 MG/DL (ref 0.57–1)
EGFRCR SERPLBLD CKD-EPI 2021: 101 ML/MIN/1.73
GLOBULIN UR ELPH-MCNC: 3.8 GM/DL
GLUCOSE SERPL-MCNC: 106 MG/DL (ref 65–99)
POTASSIUM SERPL-SCNC: 3.3 MMOL/L (ref 3.5–5.2)
PROT SERPL-MCNC: 7.3 G/DL (ref 6–8.5)
SODIUM SERPL-SCNC: 140 MMOL/L (ref 136–145)

## 2023-11-17 PROCEDURE — 80053 COMPREHEN METABOLIC PANEL: CPT | Performed by: INTERNAL MEDICINE

## 2023-11-17 PROCEDURE — 25010000002 HEPARIN (PORCINE) PER 1000 UNITS: Performed by: NURSE PRACTITIONER

## 2023-11-17 RX ORDER — ACETAMINOPHEN 325 MG/1
650 TABLET ORAL EVERY 6 HOURS PRN
Status: DISCONTINUED | OUTPATIENT
Start: 2023-11-17 | End: 2023-11-18 | Stop reason: HOSPADM

## 2023-11-17 RX ORDER — MIRTAZAPINE 15 MG/1
15 TABLET, FILM COATED ORAL NIGHTLY
Status: DISCONTINUED | OUTPATIENT
Start: 2023-11-17 | End: 2023-11-18 | Stop reason: HOSPADM

## 2023-11-17 RX ORDER — CEPHALEXIN 500 MG/1
500 CAPSULE ORAL EVERY 12 HOURS SCHEDULED
Qty: 6 CAPSULE | Refills: 0 | Status: DISCONTINUED | OUTPATIENT
Start: 2023-11-17 | End: 2023-11-18 | Stop reason: HOSPADM

## 2023-11-17 RX ORDER — CHOLECALCIFEROL (VITAMIN D3) 125 MCG
5 CAPSULE ORAL NIGHTLY PRN
Status: DISCONTINUED | OUTPATIENT
Start: 2023-11-17 | End: 2023-11-18 | Stop reason: HOSPADM

## 2023-11-17 RX ADMIN — Medication 5 MG: at 00:24

## 2023-11-17 RX ADMIN — MIRTAZAPINE 15 MG: 15 TABLET, FILM COATED ORAL at 21:45

## 2023-11-17 RX ADMIN — ACETAMINOPHEN 650 MG: 325 TABLET, FILM COATED ORAL at 00:24

## 2023-11-17 RX ADMIN — Medication 10 ML: at 21:47

## 2023-11-17 RX ADMIN — HEPARIN SODIUM 5000 UNITS: 5000 INJECTION INTRAVENOUS; SUBCUTANEOUS at 08:40

## 2023-11-17 RX ADMIN — CEPHALEXIN 500 MG: 500 CAPSULE ORAL at 21:45

## 2023-11-17 RX ADMIN — HEPARIN SODIUM 5000 UNITS: 5000 INJECTION INTRAVENOUS; SUBCUTANEOUS at 18:12

## 2023-11-17 RX ADMIN — HEPARIN SODIUM 5000 UNITS: 5000 INJECTION INTRAVENOUS; SUBCUTANEOUS at 00:24

## 2023-11-17 RX ADMIN — Medication 10 ML: at 08:41

## 2023-11-17 NOTE — PLAN OF CARE
Goal Outcome Evaluation:                      Pt arrived to unit with some confusion. PT family was at bedside. VSS, call light within reach. Bed alarm is on. Plan of care ongoing.

## 2023-11-17 NOTE — CASE MANAGEMENT/SOCIAL WORK
Discharge Planning Assessment   [facility]     Patient Name: Mackenzie Cornell  MRN: [id number]  Today's Date: 11/17/2023    Admit Date: 11/15/2023    Plan: Needs CM assessment.   Discharge Needs Assessment    No documentation.                  Discharge Plan       Row Name 11/17/23 1501       Plan    Plan Needs CM assessment.    Plan Comments CM attempted to do CM assessment but due to patient's condtion, needing to speak with family. No family at the bedside right now. CM called and left messages on both emergency contacts. Will attempt again.           Met with patient in room wearing PPE: mask.d spent less than 15 minutes in the room.       Patito Pedro RN

## 2023-11-17 NOTE — PROGRESS NOTES
[facility] MEDICINE SERVICE  DAILY PROGRESS NOTE    NAME: Mackenzie Cornell  : 1960  MRN: [id number]      LOS: 2 days     PROVIDER OF SERVICE: Conner Posadas MD    Chief Complaint: Acute metabolic encephalopathy    Subjective:     Interval History:    Patient is more alert and oriented today, however she did have some confusion this morning per the daughter.  The patient states that she has not slept well at all during her hospital stay.  She is tolerating p.o. intake.    Review of Systems:   Review of Systems   Unable to perform ROS: Mental status change       Objective:     Vital Signs  Temp:  [97.6 °F (36.4 °C)-99 °F (37.2 °C)] 99 °F (37.2 °C)  Heart Rate:  [84-85] 84  Resp:  [16-20] 19  BP: (148-162)/(74-97) 162/74   Body mass index is 55.21 kg/m².    Physical Exam  Physical Exam  Constitutional:       General: She is not in acute distress.  HENT:      Head: Normocephalic and atraumatic.      Right Ear: External ear normal.      Left Ear: External ear normal.      Nose: Nose normal.   Pulmonary:      Effort: No respiratory distress.   Musculoskeletal:         General: No deformity.      Cervical back: Normal range of motion.   Skin:     Coloration: Skin is not jaundiced.      Findings: No erythema.   Neurological:      Mental Status: She is alert.   Psychiatric:         Mood and Affect: Mood normal.         Behavior: Behavior normal.         Scheduled Meds   cephalexin, 500 mg, Oral, Q12H  heparin (porcine), 5,000 Units, Subcutaneous, Q8H  senna-docusate sodium, 2 tablet, Oral, BID  sodium chloride, 10 mL, Intravenous, Q12H       PRN Meds     acetaminophen    senna-docusate sodium **AND** polyethylene glycol **AND** bisacodyl **AND** bisacodyl    melatonin    nitroglycerin    ondansetron    [COMPLETED] Insert Peripheral IV **AND** sodium chloride    sodium chloride    sodium chloride   Infusions         Diagnostic Data    Results from last 7 days   Lab Units 23  1607   WBC 10*3/mm3 7.10    HEMOGLOBIN g/dL 13.3   HEMATOCRIT % 40.5   PLATELETS 10*3/mm3 318   GLUCOSE mg/dL 135*   CREATININE mg/dL 0.66   BUN mg/dL 10   SODIUM mmol/L 135*   POTASSIUM mmol/L 3.1*   AST (SGOT) U/L 27   ALT (SGPT) U/L 21   ALK PHOS U/L 58   BILIRUBIN mg/dL 0.5   ANION GAP mmol/L 11.0       CT Head Without Contrast    Result Date: 11/15/2023  Impression: No acute intracranial process Electronically Signed: Marvin Barrera  11/15/2023 10:22 PM EST  Workstation ID: [id number]       I reviewed the patient's new clinical results.    Assessment/Plan:     Active and Resolved Problems  Active Hospital Problems    Diagnosis  POA    **Acute metabolic encephalopathy [G93.41]  Yes    Acute UTI (urinary tract infection) [N39.0]  Yes    Multiple sclerosis [G35]  Yes      Resolved Hospital Problems   No resolved problems to display.       Acute metabolic encephalopathy secondary to UTI  -Patient has continued improvement in her mental status although may have some mild hospital delirium secondary to insomnia  -Continue treatment for UTI; switched IV Rocephin to oral Keflex given urine cultures with E. coli  -Initiate Remeron tonight for sleep to improve her sleep-wake cycle and her overall mental status     Multiple Sclerosis   - on no treatment  - PT/OT recommending home with home health     Obesity  - encourage diet and lifestyle changes  -BMI 55    DVT prophylaxis:  Medical DVT prophylaxis orders are present.     Code status is   Code Status and Medical Interventions:   Ordered at: 11/15/23 7622     Code Status (Patient has no pulse and is not breathing):    CPR (Attempt to Resuscitate)     Medical Interventions (Patient has pulse or is breathing):    Full Support       Plan for disposition: Likely DC home tomorrow    Time: 30 minutes    Signature: Electronically signed by Conner Posadas MD, 11/17/23, 14:44 EST.  [facility] Hospitalist Team

## 2023-11-18 ENCOUNTER — READMISSION MANAGEMENT (OUTPATIENT)
Dept: CALL CENTER | Facility: HOSPITAL | Age: 63
End: 2023-11-18
Payer: COMMERCIAL

## 2023-11-18 VITALS
BODY MASS INDEX: 47.63 KG/M2 | DIASTOLIC BLOOD PRESSURE: 83 MMHG | HEIGHT: 64 IN | TEMPERATURE: 98.1 F | WEIGHT: 279 LBS | SYSTOLIC BLOOD PRESSURE: 159 MMHG | HEART RATE: 101 BPM | OXYGEN SATURATION: 95 % | RESPIRATION RATE: 14 BRPM

## 2023-11-18 LAB — POTASSIUM SERPL-SCNC: 3.4 MMOL/L (ref 3.5–5.2)

## 2023-11-18 PROCEDURE — 84132 ASSAY OF SERUM POTASSIUM: CPT | Performed by: HOSPITALIST

## 2023-11-18 PROCEDURE — 25010000002 HYDRALAZINE PER 20 MG: Performed by: HOSPITALIST

## 2023-11-18 PROCEDURE — 25010000002 HEPARIN (PORCINE) PER 1000 UNITS: Performed by: NURSE PRACTITIONER

## 2023-11-18 RX ORDER — HYDRALAZINE HYDROCHLORIDE 20 MG/ML
10 INJECTION INTRAMUSCULAR; INTRAVENOUS EVERY 6 HOURS PRN
Status: DISCONTINUED | OUTPATIENT
Start: 2023-11-18 | End: 2023-11-18 | Stop reason: HOSPADM

## 2023-11-18 RX ORDER — POTASSIUM CHLORIDE 20 MEQ/1
40 TABLET, EXTENDED RELEASE ORAL EVERY 4 HOURS
Status: COMPLETED | OUTPATIENT
Start: 2023-11-18 | End: 2023-11-18

## 2023-11-18 RX ORDER — CEPHALEXIN 500 MG/1
500 CAPSULE ORAL EVERY 12 HOURS SCHEDULED
Qty: 6 CAPSULE | Refills: 0 | Status: SHIPPED | OUTPATIENT
Start: 2023-11-18 | End: 2023-11-21

## 2023-11-18 RX ADMIN — HYDRALAZINE HYDROCHLORIDE 10 MG: 20 INJECTION, SOLUTION INTRAMUSCULAR; INTRAVENOUS at 05:07

## 2023-11-18 RX ADMIN — Medication 10 ML: at 09:00

## 2023-11-18 RX ADMIN — HEPARIN SODIUM 5000 UNITS: 5000 INJECTION INTRAVENOUS; SUBCUTANEOUS at 09:03

## 2023-11-18 RX ADMIN — POTASSIUM CHLORIDE 40 MEQ: 1500 TABLET, EXTENDED RELEASE ORAL at 05:07

## 2023-11-18 RX ADMIN — CEPHALEXIN 500 MG: 500 CAPSULE ORAL at 09:03

## 2023-11-18 RX ADMIN — POTASSIUM CHLORIDE 40 MEQ: 1500 TABLET, EXTENDED RELEASE ORAL at 01:00

## 2023-11-18 RX ADMIN — HEPARIN SODIUM 5000 UNITS: 5000 INJECTION INTRAVENOUS; SUBCUTANEOUS at 00:59

## 2023-11-18 RX ADMIN — DOCUSATE SODIUM 50MG AND SENNOSIDES 8.6MG 2 TABLET: 8.6; 5 TABLET, FILM COATED ORAL at 09:03

## 2023-11-18 NOTE — OUTREACH NOTE
Prep Survey      Flowsheet Row Responses   [facility] facility patient discharged from? [facility]   Is LACE score < 7 ? No   Eligibility [facility]   Date of Admission 11/16/23   Date of Discharge 11/18/23   Discharge Disposition Home-Health Care Sv   Discharge diagnosis Acute metabolic encephalopathy   Does the patient have one of the following disease processes/diagnoses(primary or secondary)? Other   Does the patient have Home health ordered? Yes   What is the Home health agency?   [facility]    Comments regarding appointments RW to be delivered to pt in hospital room from closet   Prep survey completed? Yes            Elza CURRY - Registered Nurse

## 2023-11-18 NOTE — DISCHARGE PLACEMENT REQUEST
"[address] MEDICAL INPATIENT  [address] [facility] [place] [address]  Dept. Phone:  [phone]  Dept. Fax:  [phone] Date Ordered: 2023         Patient:  Mackenzie Cornell MRN:  [id number]   [address] [facility] [place] [address] :  1960  SSN:    Phone: [phone] Sex:  F     Weight: 127 kg (279 lb)         Ht Readings from Last 1 Encounters:   23 162.6 cm (64.02\")         Walker               (Order ID: [id number])    Diagnosis:  Urinary tract infection without hematuria, site unspecified (N39.0 [ICD-10-CM] 599.0 [ICD-9-CM])  Acute metabolic encephalopathy (G93.41 [ICD-10-CM] 348.31 [ICD-9-CM])   Quantity:  1     Equipment:  Walker Folding with Wheels  Length of Need (99 Months = Lifetime): 99 Months = Lifetime        Authorizing Provider's Phone: [phone]  Authorizing Provider:Conner Posadas MD  Authorizing Provider's NPI: [id number]  Order Entered By: Conner Posadas MD 2023  1:24 PM     Electronically signed by: Conner Posadas MD 2023  1:24 PM        Mackenzie Cornell (63 y.o. Female)       Date of Birth   1960    Social Security Number       Address   [address] [facility] [place] [address]    Home Phone   [phone]    MRN   [id number]       Pentecostalism   None    Marital Status                               Admission Date   11/15/23    Admission Type   Emergency    Admitting Provider   Jocy Donaldson MD    Attending Provider   Conner Posadas MD    Department, Room/Bed   [address] MEDICAL INPATIENT,        Discharge Date       Discharge Disposition   Home or Self Care    Discharge Destination                                 Attending Provider: Conner Posadas MD    Allergies: Sulfa Antibiotics    Isolation: None   Infection: None   Code Status: CPR    Ht: 162.6 cm (64.02\")   Wt: 127 kg (279 lb)    Admission Cmt: None   Principal Problem: Acute metabolic encephalopathy [G93.41]                   Active " Insurance as of 11/15/2023       Primary Coverage       Payor Plan Insurance Group Employer/Plan Group    [employer] COMMERCIAL AETNA [id number]       Payor Plan Address Payor Plan Phone Number Payor Plan Fax Number Effective Dates    [address] [phone]  2019 - None Entered    [place] [id number]         Subscriber Name Subscriber Birth Date Member ID       FLORENCIO CORNELL 1960 [id number]                     Emergency Contacts        (Rel.) Home Phone Work Phone Mobile Phone    Florencio Cornell (Spouse) -- -- [phone]    Jes Haskins (Daughter) -- -- [phone]                 History & Physical        Rosemarie Garcia APRN at 11/15/23 0318       Attestation signed by Jocy Donaldson MD at 23 [phone]    I have reviewed this documentation and agree.                      [facility]    Hospitalist History and Physical     Mackenzie Cornell : 1960 MRN:[id number] LOS:0 ROOM:      Reason for admission: Acute metabolic encephalopathy     Assessment / Plan     Acute metabolic encephalopathy  - spouse reports pt fell 1 week ago and has had progressive confusion  - likely 2/2 UTI  - urinalysis dark yellow, turbid, positive nitrite, large blood, large leukocytes, too numerous to count WBC and 4+ bacteria  - follow culture C&S   - CT head per radiology negative for acute intracranial abnormality  - rocephin 2G started in ED and will continue  - gentle IVF hydration  - culture C&S pending  - PT/OT eval     Multiple Sclerosis   - on no treatment  - PT/OT evaluation    Obesity  - encourage diet and lifestyle changes  -BMI 55    Code Status (Patient has no pulse and is not breathing): CPR (Attempt to Resuscitate)  Medical Interventions (Patient has pulse or is breathing): Full Support       Nutrition:   Diet: Regular/House Diet; Texture: Regular Texture (IDDSI 7); Fluid Consistency: Thin (IDDSI 0)     DVT prophylaxis:  Medical DVT prophylaxis orders are  present.     History of Present illness     Mackenzie Cornell is a 63 y.o. female with PMH of multiple sclerosis and obesity presented to the hospital for confusion, and was admitted with a principal diagnosis of Acute metabolic encephalopathy.     Per spouse report at bedside patient sustained a fall last week and had difficulty with standing.  He reports he had noticed that mentation had been altered and was getting progressively worse with difficulty with word finding and slow to respond.  She reports over the past 4 to 5 days she has been more fatigued chilled and generalized body aches.  She denies any chest pain nausea vomiting or dysuria    ED course: Patient was negative for any focal deficits.  CT of the head was negative for any acute intracranial abnormality.  Vital signs temperature max 99.5.  Pulse between 85 and 100.  Blood pressure 147/66.  O2 stable on room air.  Lactic acid 1.3.  Mild hyponatremia with sodium 134.  Negative for leukocytosis.  Hemoglobin stable.  Urinalysis dark yellow turbid positive for nitrite moderate blood 3+ too numerous to count white blood cells and 4+ bacteria.  She started on IV Rocephin.  Admitted for further treatment    Patient was seen and examined on 11/15/23 at 23:23 EST .    Subjective / Review of systems     Review of Systems   Constitutional:  Positive for fatigue.   Neurological:  Positive for weakness.        Confusion, difficulty with word find   All other systems reviewed and are negative.         Past Medical/Surgical/Social/Family History & Allergies     No past medical history on file.   Past Surgical History:   Procedure Laterality Date    COLONOSCOPY      TONSILLECTOMY      WISDOM TOOTH EXTRACTION        Social History     Socioeconomic History    Marital status:    Tobacco Use    Smoking status: Never    Smokeless tobacco: Never   Substance and Sexual Activity    Alcohol use: Yes    Drug use: Never    Sexual activity: Defer      Family History   Problem  Relation Age of Onset    Hypertension Mother     Heart disease Mother         A-fib    Other Father         Alzheimers, dementia    Asthma Brother     No Known Problems Daughter     Asthma Son     Other Son         sleep apnea, narcelpsy      Allergies   Allergen Reactions    Sulfa Antibiotics Hives      Social Determinants of Health     Tobacco Use: Not on file   Alcohol Use: Not on file   Financial Resource Strain: Not on file   Food Insecurity: Not on file   Transportation Needs: Not on file   Physical Activity: Not on file   Stress: Not on file   Social Connections: Unknown (10/12/2023)    Family and Community Support     Help with Day-to-Day Activities: Not on file     Lonely or Isolated: Not on file   Interpersonal Safety: Not At Risk (11/15/2023)    Abuse Screen     Unsafe at Home or Work/School: no     Feels Threatened by Someone?: no     Does Anyone Keep You from Contacting Others or Doint Things Outside the Home?: no     Physical Sign of Abuse Present: no   Depression: Not on file   Housing Stability: Unknown (10/12/2023)    Housing Stability     Current Living Arrangements: Not on file     Potentially Unsafe Housing Conditions: Not on file   Utilities: Not on file   Health Literacy: Unknown (10/12/2023)    Education     Help with school or training?: Not on file     Preferred Language: Not on file   Employment: Unknown (10/12/2023)    Employment     Do you want help finding or keeping work or a job?: Not on file   Disabilities: Unknown (10/12/2023)    Disabilities     Concentrating, Remembering, or Making Decisions Difficulty: Not on file     Doing Errands Independently Difficulty: Not on file        Home Medications     Prior to Admission medications    Medication Sig Start Date End Date Taking? Authorizing Provider   Multiple Vitamins-Minerals (MULTI FOR HER) pack 1 tablet Daily. 7/11/16   Provider, MD Ava        Objective / Physical Exam     Vital signs:  Temp: 99.5 °F (37.5 °C)  BP:  147/66  Heart Rate: 93  Resp: 15  SpO2: 97 %    Admission Weight:      Physical Exam  Constitutional:       Comments: Pt alert and oriented but movement and speech appear delayed. Difficulty with word find at times   Eyes:      Pupils: Pupils are equal, round, and reactive to light.   Cardiovascular:      Rate and Rhythm: Normal rate and regular rhythm.   Pulmonary:      Effort: Pulmonary effort is normal.      Breath sounds: Normal breath sounds.   Abdominal:      Palpations: Abdomen is soft.      Comments: Obese abdomen    Skin:     Comments: Hyperkeratotic plaquing bilateral lower extremities   Neurological:      General: No focal deficit present.            Labs     Results from last 7 days   Lab Units 11/15/23  2103   WBC 10*3/mm3 7.40   HEMOGLOBIN g/dL 13.5   HEMATOCRIT % 40.9   PLATELETS 10*3/mm3 313      Results from last 7 days   Lab Units 11/15/23  2103   ALK PHOS U/L 56   AST (SGOT) U/L 28   ALT (SGPT) U/L 19           Results from last 7 days   Lab Units 11/15/23  2103   SODIUM mmol/L 134*   POTASSIUM mmol/L 3.5   CHLORIDE mmol/L 95*   CO2 mmol/L 27.0   BUN mg/dL 9   CREATININE mg/dL 0.71   GLUCOSE mg/dL 106*        Imaging     CT Head Without Contrast    Result Date: 11/15/2023  CT HEAD WO CONTRAST Date of Exam: 11/15/2023 9:57 PM EST Indication: confusion. Comparison: None available. Technique: Axial CT images were obtained of the head without contrast administration.  Coronal reconstructions were performed.  Automated exposure control and iterative reconstruction methods were used. Findings: No hemorrhage, edema, or mass effect. Chronic small vessel ischemic changes present in the periventricular white matter. No abnormal extra-axial fluid collection. No fluid in the visualized paranasal sinuses/middle ear cavities     Impression: No acute intracranial process Electronically Signed: Marvin Barrera  11/15/2023 10:22 PM EST  Workstation ID: [id number]      ECG 12 Lead Altered Mental Status   Preliminary  Result   HEART RATE= 93  bpm   RR Interval= 644  ms   NC Interval= 134  ms   P Horizontal Axis= -19  deg   P Front Axis= 48  deg   QRSD Interval= 86  ms   QT Interval= 345  ms   QTcB= 430  ms   QRS Axis= 53  deg   T Wave Axis= 31  deg   - BORDERLINE ECG -   Sinus rhythm   Probable left atrial enlargement   When compared with ECG of 2016 0:31:26,   No significant change   Electronically Signed By:    Date and Time of Study: 2023-11-15 20:29:41           Current Medications     Scheduled Meds:  cefTRIAXone, 2,000 mg, Intravenous, Q24H  heparin (porcine), 5,000 Units, Subcutaneous, Q8H  senna-docusate sodium, 2 tablet, Oral, BID  sodium chloride, 10 mL, Intravenous, Q12H         Rosemarie Garcia [facility] Medicine  11/15/23   23:23 EST      Electronically signed by Jocy Donaldson MD at 23 0628          Physician Progress Notes (last 24 hours)        Conner Posadas MD at 23 1444              [facility] MEDICINE SERVICE  DAILY PROGRESS NOTE    NAME: Mackenzie Cornell  : 1960  MRN: [id number]      LOS: 2 days     PROVIDER OF SERVICE: Conner Posadas MD    Chief Complaint: Acute metabolic encephalopathy    Subjective:     Interval History:    Patient is more alert and oriented today, however she did have some confusion this morning per the daughter.  The patient states that she has not slept well at all during her hospital stay.  She is tolerating p.o. intake.    Review of Systems:   Review of Systems   Unable to perform ROS: Mental status change       Objective:     Vital Signs  Temp:  [97.6 °F (36.4 °C)-99 °F (37.2 °C)] 99 °F (37.2 °C)  Heart Rate:  [84-85] 84  Resp:  [16-20] 19  BP: (148-162)/(74-97) 162/74   Body mass index is 55.21 kg/m².    Physical Exam  Physical Exam  Constitutional:       General: She is not in acute distress.  HENT:      Head: Normocephalic and atraumatic.      Right Ear: External ear normal.      Left Ear: External ear normal.      Nose: Nose normal.    Pulmonary:      Effort: No respiratory distress.   Musculoskeletal:         General: No deformity.      Cervical back: Normal range of motion.   Skin:     Coloration: Skin is not jaundiced.      Findings: No erythema.   Neurological:      Mental Status: She is alert.   Psychiatric:         Mood and Affect: Mood normal.         Behavior: Behavior normal.         Scheduled Meds   cephalexin, 500 mg, Oral, Q12H  heparin (porcine), 5,000 Units, Subcutaneous, Q8H  senna-docusate sodium, 2 tablet, Oral, BID  sodium chloride, 10 mL, Intravenous, Q12H       PRN Meds     acetaminophen    senna-docusate sodium **AND** polyethylene glycol **AND** bisacodyl **AND** bisacodyl    melatonin    nitroglycerin    ondansetron    [COMPLETED] Insert Peripheral IV **AND** sodium chloride    sodium chloride    sodium chloride   Infusions         Diagnostic Data    Results from last 7 days   Lab Units 11/16/23  1607   WBC 10*3/mm3 7.10   HEMOGLOBIN g/dL 13.3   HEMATOCRIT % 40.5   PLATELETS 10*3/mm3 318   GLUCOSE mg/dL 135*   CREATININE mg/dL 0.66   BUN mg/dL 10   SODIUM mmol/L 135*   POTASSIUM mmol/L 3.1*   AST (SGOT) U/L 27   ALT (SGPT) U/L 21   ALK PHOS U/L 58   BILIRUBIN mg/dL 0.5   ANION GAP mmol/L 11.0       CT Head Without Contrast    Result Date: 11/15/2023  Impression: No acute intracranial process Electronically Signed: Marvin Barrera  11/15/2023 10:22 PM EST  Workstation ID: [id number]       I reviewed the patient's new clinical results.    Assessment/Plan:     Active and Resolved Problems  Active Hospital Problems    Diagnosis  POA    **Acute metabolic encephalopathy [G93.41]  Yes    Acute UTI (urinary tract infection) [N39.0]  Yes    Multiple sclerosis [G35]  Yes      Resolved Hospital Problems   No resolved problems to display.       Acute metabolic encephalopathy secondary to UTI  -Patient has continued improvement in her mental status although may have some mild hospital delirium secondary to insomnia  -Continue treatment for  UTI; switched IV Rocephin to oral Keflex given urine cultures with E. coli  -Initiate Remeron tonight for sleep to improve her sleep-wake cycle and her overall mental status     Multiple Sclerosis   - on no treatment  - PT/OT recommending home with home health     Obesity  - encourage diet and lifestyle changes  -BMI 55    DVT prophylaxis:  Medical DVT prophylaxis orders are present.     Code status is   Code Status and Medical Interventions:   Ordered at: 11/15/23 2321     Code Status (Patient has no pulse and is not breathing):    CPR (Attempt to Resuscitate)     Medical Interventions (Patient has pulse or is breathing):    Full Support       Plan for disposition: Likely DC home tomorrow    Time: 30 minutes    Signature: Electronically signed by Conner Posadas MD, 11/17/23, 14:44 EST.  [facility] Hospitalist Team     Electronically signed by Conner Posadas MD at 11/17/23 1448       Physical Therapy Notes (last 24 hours)  Notes from 11/17/23 1440 through 11/18/23 1440   No notes exist for this encounter.       Occupational Therapy Notes (last 24 hours)  Notes from 11/17/23 1440 through 11/18/23 1440   No notes exist for this encounter.

## 2023-11-18 NOTE — DISCHARGE SUMMARY
[facility] Medicine Services  Discharge Summary    Date of Service: 2023  Patient Name: Mackenzie Cornell  : 1960  MRN: [id number]    Date of Admission: 11/15/2023  Discharge Diagnosis:   Acute metabolic encephalopathy secondary to UTI  Multiple sclerosis  Morbid obesity    Date of Discharge: 2023  Primary Care Physician: Jimi Chua Sr., MD      Presenting Problem:   Confusion [R41.0]  Urinary tract infection without hematuria, site unspecified [N39.0]  Acute metabolic encephalopathy [G93.41]    Active and Resolved Hospital Problems:  Active Hospital Problems    Diagnosis POA    **Acute metabolic encephalopathy [G93.41] Yes    Acute UTI (urinary tract infection) [N39.0] Yes    Multiple sclerosis [G35] Yes      Resolved Hospital Problems   No resolved problems to display.         Hospital Course     HPI:  Patient is a 63-year-old female who presents the hospital with complaints of mental status changes.  Please see H&P for details.    Hospital Course:  The patient was admitted to the hospital and was found to have an acute UTI.  She was started on broad-spectrum IV antibiotics with Rocephin.  Urine cultures grew E. coli and the patient was transitioned to oral Keflex prior to discharge.  Her mental status improved as her UTI improved.  She also had poor sleep-wake cycle during her hospital stay which led to some hospital delirium.  However she slept well the day prior to discharge and was much more alert and oriented at that time.  She is stable for discharge.  Patient has a mobility limitation that significantly impairs their ability to participate in one or more mobility- related activities of daily living. The patient is able to safely use the walker. The functional mobility deficit can be sufficiently resolved by use of a walker.          DISCHARGE Follow Up Recommendations for labs and diagnostics: Follow-up with PCP in 1 week      Reasons For Change In Medications and  Indications for New Medications:      Day of Discharge     Vital Signs:  Temp:  [98.1 °F (36.7 °C)-98.7 °F (37.1 °C)] 98.1 °F (36.7 °C)  Heart Rate:  [] 101  Resp:  [14-20] 14  BP: (159-188)/(67-87) 159/83    Physical Exam:  Physical Exam   General Appearance:  Alert, cooperative, no distress, appears stated age, morbidly obese  Head:  Normocephalic, without obvious abnormality, atraumatic  Eyes:  PERRL, conjunctiva/corneas clear, EOM's intact, fundi benign, both eyes  Ears:  Normal TM's and external ear canals, both ears  Nose: Nares normal, septum midline, mucosa normal, no drainage or sinus tenderness  Throat: Lips, mucosa, and tongue normal; teeth and gums normal  Neck: Supple, symmetrical, trachea midline, no adenopathy, thyroid: not enlarged, symmetric, no tenderness/mass/nodules, no carotid bruit or JVD  Lungs:   Clear to auscultation bilaterally, respirations unlabored  Heart:  Regular rate and rhythm, S1, S2 normal, no murmur, rub or gallop  Abdomen:  Soft, non-tender, bowel sounds active all four quadrants,  no masses, no organomegaly  Extremities: Extremities normal, atraumatic, no cyanosis or edema  Pulses: 2+ and symmetric  Skin: Skin color, texture, turgor normal, no rashes or lesions  Neurologic: Normal        Pertinent  and/or Most Recent Results     LAB RESULTS:      Lab 11/16/23  1607 11/15/23  2228 11/15/23  2103   WBC 7.10  --  7.40   HEMOGLOBIN 13.3  --  13.5   HEMATOCRIT 40.5  --  40.9   PLATELETS 318  --  313   NEUTROS ABS 4.10  --  4.90   LYMPHS ABS 2.00  --  1.70   MONOS ABS 0.90  --  0.70   EOS ABS 0.10  --  0.00   MCV 87.0  --  86.0   LACTATE  --  1.3  --          Lab 11/18/23  0941 11/17/23  2223 11/16/23  1607 11/15/23  2103   SODIUM  --  140 135* 134*   POTASSIUM 3.4* 3.3* 3.1* 3.5   CHLORIDE  --  102 98 95*   CO2  --  27.0 26.0 27.0   ANION GAP  --  11.0 11.0 12.0   BUN  --  6* 10 9   CREATININE  --  0.57 0.66 0.71   EGFR  --  101.0 97.5 94.5   GLUCOSE  --  106* 135* 106*    CALCIUM  --  8.9 8.9 8.5*   MAGNESIUM  --   --   --  2.1   TSH  --   --   --  1.400         Lab 11/17/23 2223 11/16/23  1607 11/15/23  2103   TOTAL PROTEIN 7.3 7.1 6.7   ALBUMIN 3.5 3.3* 3.4*   GLOBULIN 3.8 3.8 3.3   ALT (SGPT) 22 21 19   AST (SGOT) 27 27 28   BILIRUBIN 0.3 0.5 0.7   ALK PHOS 56 58 56                     Brief Urine Lab Results  (Last result in the past 365 days)        Color   Clarity   Blood   Leuk Est   Nitrite   Protein   CREAT   Urine HCG        11/15/23 2118 Dark Yellow   Turbid   Moderate (2+)   Large (3+)   Positive   100 mg/dL (2+)                 Microbiology Results (last 10 days)       Procedure Component Value - Date/Time    Blood Culture - Blood, Arm, Right [[id number]]  (Normal) Collected: 11/15/23 2233    Lab Status: Preliminary result Specimen: Blood from Arm, Right Updated: 11/17/23 2245     Blood Culture No growth at 2 days    Narrative:      Less than seven (7) mL's of blood was collected.  Insufficient quantity may yield false negative results.    Blood Culture - Blood, Arm, Left [[id number]]  (Normal) Collected: 11/15/23 2222    Lab Status: Preliminary result Specimen: Blood from Arm, Left Updated: 11/17/23 2245     Blood Culture No growth at 2 days    Narrative:      Less than seven (7) mL's of blood was collected.  Insufficient quantity may yield false negative results.    Urine Culture - Urine, Straight Cath [[id number]]  (Abnormal)  (Susceptibility) Collected: 11/15/23 2118    Lab Status: Final result Specimen: Urine from Straight Cath Updated: 11/17/23 0524     Urine Culture >100,000 CFU/mL Escherichia coli    Narrative:      Colonization of the urinary tract without infection is common. Treatment is discouraged unless the patient is symptomatic, pregnant, or undergoing an invasive urologic procedure.    Susceptibility        Escherichia coli      TRUPTI      Ampicillin Resistant      Ampicillin + Sulbactam Resistant      Cefazolin Susceptible      Cefepime Susceptible       Ceftazidime Susceptible      Ceftriaxone Susceptible      Gentamicin Susceptible      Levofloxacin Susceptible      Nitrofurantoin Susceptible      Piperacillin + Tazobactam Susceptible      Trimethoprim + Sulfamethoxazole Susceptible                                   CT Head Without Contrast    Result Date: 11/15/2023  Impression: Impression: No acute intracranial process Electronically Signed: Marvin Barrera  11/15/2023 10:22 PM EST  Workstation ID: [id number]                 Labs Pending at Discharge:  Pending Labs       Order Current Status    Blood Culture - Blood, Arm, Left Preliminary result    Blood Culture - Blood, Arm, Right Preliminary result            Procedures Performed           Consults:   Consults       No orders found from 10/17/2023 to 11/16/2023.              Discharge Details        Discharge Medications        New Medications        Instructions Start Date   cephalexin 500 MG capsule  Commonly known as: KEFLEX   500 mg, Oral, Every 12 Hours Scheduled             Continue These Medications        Instructions Start Date   Multi For Her pack   1 tablet, Daily               Allergies   Allergen Reactions    Sulfa Antibiotics Hives         Discharge Disposition:     Home or Self Care    Diet:  Hospital:  Diet Order   Procedures    Diet: Regular/House Diet; Texture: Regular Texture (IDDSI 7); Fluid Consistency: Thin (IDDSI 0)         Discharge Activity:         CODE STATUS:  Code Status and Medical Interventions:   Ordered at: 11/15/23 2321     Code Status (Patient has no pulse and is not breathing):    CPR (Attempt to Resuscitate)     Medical Interventions (Patient has pulse or is breathing):    Full Support         No future appointments.    Additional Instructions for the Follow-ups that You Need to Schedule       Ambulatory Referral to Home Health (Hospital)   As directed      Face to Face Visit Date: 11/18/2023   Follow-up provider for Plan of Care?: I treated the patient in an acute care  facility and will not continue treatment after discharge.   Follow-up provider: RODDY CHUA [[id number]]   Reason/Clinical Findings: Physical deconditioning   Describe mobility limitations that make leaving home difficult: Patient is morbidly obese with significant physical deconditioning from recent hospital stay requiring assistance to ambulate short distances   Nursing/Therapeutic Services Requested: Physical Therapy Occupational Therapy   PT orders: Therapeutic exercise Strengthening   Occupational orders: Strengthening Home safety assessment Activities of daily living   Frequency: 1 Week 1        Discharge Follow-up with PCP   As directed       Currently Documented PCP:    Roddy Chua Sr., MD    PCP Phone Number:    [phone]     Follow Up Details: Follow-up with PCP in 1 week                Time spent on Discharge including face to face service:  >30 minutes    Signature: Electronically signed by Conner Posadas MD, 11/18/23, 13:24 EST.  [facility] Hospitalist Team

## 2023-11-18 NOTE — PLAN OF CARE
Goal Outcome Evaluation:  Plan of Care Reviewed With: patient        Progress: no change  Outcome Evaluation: Patient /70 BP meds were given. patient appears to be resting in chair. Chair alarm is on call light is in reach.

## 2023-11-18 NOTE — CASE MANAGEMENT/SOCIAL WORK
Continued Stay Note  MARVIN Parks     Patient Name: Mackenzie Cornell  MRN: [id number]  Today's Date: 11/18/2023    Admit Date: 11/15/2023    Plan: home with [facility]- pending accept., order in.  RW from [facility]   Discharge Plan       Row Name 11/18/23 1445       Plan    Plan home with [facility]- pending accept., order in.  RW from [facility]    Patient/Family in Agreement with Plan yes    Provided Post Acute Provider List? Yes    Post Acute Provider List Home Health    Delivered To Patient;Support Person    Plan Comments CM notified that family desired HH and is agreeable. reviewed HH list with spouse and pt. agreeable to [facility] first then any other if needed.  Referral sent in epic and notified on call liasion.  RW order veriifed, delivered to pt 's room from onsite dme closet.                      Expected Discharge Date and Time       Expected Discharge Date Expected Discharge Time    Nov 18, 2023               Coty Ortega RN

## 2023-11-19 NOTE — CASE MANAGEMENT/SOCIAL WORK
Continued Stay Note   [facility]     Patient Name: Mackenzie Cornell  MRN: [id number]  Today's Date: 11/19/2023    Admit Date: 11/15/2023    Plan: Home.   order recom.  Family agreeable. order in.  RW from [facility]   Discharge Plan       Row Name 11/19/23 1735       Plan    Plan Home.   order recom.  Family agreeable. order in.  RW from [facility] [facility]    Plan Comments Declined by CRYSTAL, MOODY and Mark.  sent to  supervisor for further follow up.                      Expected Discharge Date and Time       Expected Discharge Date Expected Discharge Time    Nov 18, 2023               Coty Ortega RN

## 2023-11-20 ENCOUNTER — TRANSITIONAL CARE MANAGEMENT TELEPHONE ENCOUNTER (OUTPATIENT)
Dept: CALL CENTER | Facility: HOSPITAL | Age: 63
End: 2023-11-20
Payer: COMMERCIAL

## 2023-11-20 LAB
BACTERIA SPEC AEROBE CULT: NORMAL
BACTERIA SPEC AEROBE CULT: NORMAL

## 2023-11-20 NOTE — OUTREACH NOTE
Call Center TCM Note      Flowsheet Row Responses   [facility] patient discharged from? [facility]   Does the patient have one of the following disease processes/diagnoses(primary or secondary)? Other   TCM attempt successful? Yes   Call start time 1124   Call end time 1203   Discharge diagnosis Acute metabolic encephalopathy   Person spoke with today (if not patient) and relationship spouse   Meds reviewed with patient/caregiver? Yes   Is the patient having any side effects they believe may be caused by any medication additions or changes? No   Does the patient have all medications ordered at discharge? Yes   Is the patient taking all medications as directed (includes completed medication regime)? Yes   Does the patient have an appointment with their PCP within 7-14 days of discharge? Yes  [11/21/2023 at 3:15 PM]   What is the Home health agency?   [facility]    Has home health visited the patient within 72 hours of discharge? Call prior to 72 hours   Psychosocial issues? No   Did the patient receive a copy of their discharge instructions? Yes   Nursing interventions Reviewed instructions with patient   What is the patient's perception of their health status since discharge? Same   Is the patient/caregiver able to teach back signs and symptoms related to disease process for when to call PCP? Yes   Is the patient/caregiver able to teach back signs and symptoms related to disease process for when to call 911? Yes   Is the patient/caregiver able to teach back the hierarchy of who to call/visit for symptoms/problems? PCP, Specialist, Home health nurse, Urgent Care, ED, 911 Yes   If the patient is a current smoker, are they able to teach back resources for cessation? Not a smoker   TCM call completed? Yes   Wrap up additional comments Spouse states pt is still showing s/s of confusion. Educated spouse on s/s of sepsis/infection and when to call 911/go to ER. RN called PCP office as pt has not seen Dr Chua since 2021,  and Evelin at PCP office made appt at [place] location for pt to be seen by Yarely Chand tomorrow on 11/21/23. RN called spouse back to inform him of appt, and advised to take pt to ER if he felt pt has worsened since hospital dc. spouse verbalized understanding.   Call end time 1203   Would this patient benefit from a Referral to [facility] Social Work? No   Is the patient interested in additional calls from an ambulatory ? No            Mariposa Price RN    11/20/2023, 12:09 EST

## 2023-11-20 NOTE — CASE MANAGEMENT/SOCIAL WORK
Case Management Discharge Note      Final Note: Home with home health    Provided Post Acute Provider List?: Yes  Post Acute Provider List: Home Health  Delivered To: Patient, Support Person    Selected Continued Care - Discharged on 11/18/2023 Admission date: 11/15/2023 - Discharge disposition: Home or Self Care        Durable Medical Equipment Coordination complete.      Service Provider Selected Services Address Phone Fax Patient Preferred    [facility] [address], [place] [address] [phone] --                 Transportation Services  Private: Car    Final Discharge Disposition Code: 06 - home with home health care

## 2023-11-20 NOTE — PAYOR COMM NOTE
"This is discharge notification for Mackenzie Cornlel   Reference/Auth # [id number]  Pt discharged on 11/18/23    PENDING INPATIENT AUTHORIZATION REQUEST    Pari Gallardo RN, BSN  Utilization Review Nurse  [facility]  Direct & confidential phone # [phone]  Fax # [phone]      Mackenzie Cornell (63 y.o. Female)       Date of Birth   1960    Social Security Number       Address   [address] [place] [address] [place] [address]    Home Phone   [phone]    MRN   [id number]       Protestant   None    Marital Status                               Admission Date   11/15/23    Admission Type   Emergency    Admitting Provider   Jocy Donaldson MD    Attending Provider       Department, Room/Bed   [facility] 3A MEDICAL INPATIENT, 312/1       Discharge Date   11/18/2023    Discharge Disposition   Home or Self Care    Discharge Destination                                 Attending Provider: (none)   Allergies: Sulfa Antibiotics    Isolation: None   Infection: None   Code Status: Prior    Ht: 162.6 cm (64.02\")   Wt: 127 kg (279 lb)    Admission Cmt: None   Principal Problem: Acute metabolic encephalopathy [G93.41]                   Active Insurance as of 11/15/2023       Primary Coverage       Payor Plan Insurance Group Employer/Plan Group    [employer]CIAL AET[employer] [id number]       Payor Plan Address Payor Plan Phone Number Payor Plan Fax Number Effective Dates    [address] [phone]  1/1/2019 - None Entered    [facility] [id number]         Subscriber Name Subscriber Birth Date Member ID       FLORENCIO CORNELL 1960 [id number]                     Emergency Contacts        (Rel.) Home Phone Work Phone Mobile Phone    Florencio Cornell (Spouse) -- -- [phone]    Jes Haskins (Daughter) -- -- [phone]              Vital Signs (last 2 days) before discharge       Date/Time Temp Temp src Pulse Resp BP Patient Position SpO2    11/18/23 1208 -- -- 101 14 159/83 " -- 95    11/18/23 0820 98.1 (36.7) -- 93 16 167/77 -- 95    11/18/23 0600 -- -- -- -- 177/67 -- --    11/18/23 0422 98.5 (36.9) Oral 91 20 188/70 Sitting 96    11/18/23 0046 -- -- 85 20 163/86 Sitting 97    11/17/23 1922 98.7 (37.1) Oral 93 19 180/87 Sitting 92    11/17/23 1630 -- -- -- -- -- -- 96    11/17/23 1500 98.7 (37.1) Oral -- 17 170/77 Sitting 86    11/17/23 1234 99 (37.2) Axillary -- 19 162/74 Sitting 96    11/17/23 0700 97.8 (36.6) Oral -- -- -- -- --    11/17/23 0434 97.6 (36.4) -- 84 18 148/76 Sitting 96    11/16/23 2248 98.2 (36.8) Oral 85 20 158/97 Lying 96    11/16/23 1512 97.7 (36.5) Oral 85 16 157/76 Sitting 97    11/16/23 1200 97.8 (36.6) Oral 89 16 149/80 Sitting 94    11/16/23 0736 98.4 (36.9) Oral 81 15 140/56 Sitting 95    11/16/23 0432 98.4 (36.9) Oral 92 14 149/65 Lying 95    11/16/23 0132 -- -- 99 -- 129/88 -- 91    11/16/23 0117 -- -- 92 -- 162/86 -- 93    11/16/23 0047 -- -- 93 -- 155/64 -- 95    11/16/23 0033 -- -- 93 -- 136/58 -- 95    11/16/23 0002 -- -- 92 -- 134/60 -- 89          Oxygen Therapy (last 2 days) before discharge       Date/Time SpO2 Device (Oxygen Therapy) Flow (L/min) Oxygen Concentration (%) ETCO2 (mmHg)    11/18/23 1208 95 -- -- -- --    11/18/23 1000 -- room air -- -- --    11/18/23 0820 95 room air -- -- --    11/18/23 0700 -- room air -- -- --    11/18/23 0422 96 room air -- -- --    11/18/23 0046 97 room air -- -- --    11/17/23 2137 -- room air -- -- --    11/17/23 1922 92 room air -- -- --    11/17/23 1630 96 room air -- -- --    11/17/23 1500 86 room air -- -- --    11/17/23 1234 96 room air -- -- --    11/17/23 0859 -- room air -- -- --    11/17/23 0434 96 room air -- -- --    11/16/23 2248 96 room air -- -- --    11/16/23 2000 -- room air -- -- --    11/16/23 1512 97 -- -- -- --    11/16/23 1200 94 -- -- -- --    11/16/23 0736 95 room air -- -- --    11/16/23 0432 95 room air -- -- --    11/16/23 0358 -- room air -- -- --    11/16/23 0132 91 -- -- -- --     23 0117 93 -- -- -- --    23 0047 95 -- -- -- --    23 0033 95 -- -- -- --    23 0002 89 -- -- -- --                Roberth Dobbins MD   Physician  Hospitalist     Progress Notes      Signed     Date of Service: 23  Creation Time: 23     Signed              [facility] MEDICINE SERVICE  DAILY PROGRESS NOTE     NAME: Mackenzie Cornell  : 1960  MRN: [id number]       LOS: 1 day      PROVIDER OF SERVICE: Roberth Dobbins MD     Chief Complaint: Acute metabolic encephalopathy     Subjective:      Interval History:  History taken from: RN     Periods of lucidity improving.  Still having frequent episodes of confusion though.     Review of Systems:   Review of Systems   Unable to perform ROS: Mental status change         Objective:      Vital Signs  Temp:  [97.7 °F (36.5 °C)-99.5 °F (37.5 °C)] 97.7 °F (36.5 °C)  Heart Rate:  [] 85  Resp:  [14-18] 16  BP: (129-167)/() 157/76   Body mass index is 55.24 kg/m².     Physical Exam  Physical Exam  Constitutional:       General: She is not in acute distress.  HENT:      Head: Normocephalic and atraumatic.      Right Ear: External ear normal.      Left Ear: External ear normal.      Nose: Nose normal.   Pulmonary:      Effort: No respiratory distress.   Musculoskeletal:         General: No deformity.      Cervical back: Normal range of motion.   Skin:     Coloration: Skin is not jaundiced.      Findings: No erythema.   Neurological:      Mental Status: She is alert.   Psychiatric:         Mood and Affect: Mood normal.         Behavior: Behavior normal.            Scheduled Meds   cefTRIAXone, 2,000 mg, Intravenous, Q24H  heparin (porcine), 5,000 Units, Subcutaneous, Q8H  senna-docusate sodium, 2 tablet, Oral, BID  sodium chloride, 10 mL, Intravenous, Q12H        PRN Meds     acetaminophen    senna-docusate sodium **AND** polyethylene glycol **AND** bisacodyl **AND** bisacodyl    nitroglycerin    ondansetron    [COMPLETED]  Insert Peripheral IV **AND** sodium chloride    sodium chloride    sodium chloride   Infusions                   Diagnostic Data         Results from last 7 days   Lab Units 23  1607   WBC 10*3/mm3 7.10   HEMOGLOBIN g/dL 13.3   HEMATOCRIT % 40.5   PLATELETS 10*3/mm3 318   GLUCOSE mg/dL 135*   CREATININE mg/dL 0.66   BUN mg/dL 10   SODIUM mmol/L 135*   POTASSIUM mmol/L 3.1*   AST (SGOT) U/L 27   ALT (SGPT) U/L 21   ALK PHOS U/L 58   BILIRUBIN mg/dL 0.5   ANION GAP mmol/L 11.0         CT Head Without Contrast     Result Date: 11/15/2023  Impression: No acute intracranial process Electronically Signed: Marvin Barrera  11/15/2023 10:22 PM EST  Workstation ID: [id number]         I reviewed the patient's new clinical results.     Assessment/Plan:      Active and Resolved Problems        Active Hospital Problems     Diagnosis   POA    **Acute metabolic encephalopathy [G93.41]   Yes       Resolved Hospital Problems   No resolved problems to display.         Acute metabolic encephalopathy  UTI  So far mental status is trending towards improvement with treatment of UTI.  - Continue IV rocephin     Multiple Sclerosis   - on no treatment  - PT/OT evaluation     Obesity  - encourage diet and lifestyle changes  -BMI 55     DVT prophylaxis:  Medical DVT prophylaxis orders are present.      Code status is       Code Status and Medical Interventions:   Ordered at: 11/15/23 2321     Code Status (Patient has no pulse and is not breathing):     CPR (Attempt to Resuscitate)     Medical Interventions (Patient has pulse or is breathing):     Full Support         Plan for disposition:TBD in 1-2 days     Time: 30 minutes     Signature: Electronically signed by Roberth Dobbins MD, 23, 18:34 EST.  [facility] Hospitalist Team                     Physician Progress Notes (last 72 hours)        Conner Posadas MD at 23 1444              [facility] MEDICINE SERVICE  DAILY PROGRESS NOTE    NAME: Mackenzie Cornell  : 1960  MRN:  [id number]      LOS: 2 days     PROVIDER OF SERVICE: Conner Posadas MD    Chief Complaint: Acute metabolic encephalopathy    Subjective:     Interval History:    Patient is more alert and oriented today, however she did have some confusion this morning per the daughter.  The patient states that she has not slept well at all during her hospital stay.  She is tolerating p.o. intake.    Review of Systems:   Review of Systems   Unable to perform ROS: Mental status change       Objective:     Vital Signs  Temp:  [97.6 °F (36.4 °C)-99 °F (37.2 °C)] 99 °F (37.2 °C)  Heart Rate:  [84-85] 84  Resp:  [16-20] 19  BP: (148-162)/(74-97) 162/74   Body mass index is 55.21 kg/m².    Physical Exam  Physical Exam  Constitutional:       General: She is not in acute distress.  HENT:      Head: Normocephalic and atraumatic.      Right Ear: External ear normal.      Left Ear: External ear normal.      Nose: Nose normal.   Pulmonary:      Effort: No respiratory distress.   Musculoskeletal:         General: No deformity.      Cervical back: Normal range of motion.   Skin:     Coloration: Skin is not jaundiced.      Findings: No erythema.   Neurological:      Mental Status: She is alert.   Psychiatric:         Mood and Affect: Mood normal.         Behavior: Behavior normal.         Scheduled Meds   cephalexin, 500 mg, Oral, Q12H  heparin (porcine), 5,000 Units, Subcutaneous, Q8H  senna-docusate sodium, 2 tablet, Oral, BID  sodium chloride, 10 mL, Intravenous, Q12H       PRN Meds     acetaminophen    senna-docusate sodium **AND** polyethylene glycol **AND** bisacodyl **AND** bisacodyl    melatonin    nitroglycerin    ondansetron    [COMPLETED] Insert Peripheral IV **AND** sodium chloride    sodium chloride    sodium chloride   Infusions         Diagnostic Data    Results from last 7 days   Lab Units 11/16/23  1607   WBC 10*3/mm3 7.10   HEMOGLOBIN g/dL 13.3   HEMATOCRIT % 40.5   PLATELETS 10*3/mm3 318   GLUCOSE mg/dL 135*   CREATININE mg/dL  0.66   BUN mg/dL 10   SODIUM mmol/L 135*   POTASSIUM mmol/L 3.1*   AST (SGOT) U/L 27   ALT (SGPT) U/L 21   ALK PHOS U/L 58   BILIRUBIN mg/dL 0.5   ANION GAP mmol/L 11.0       CT Head Without Contrast    Result Date: 11/15/2023  Impression: No acute intracranial process Electronically Signed: Marvin Barrera  11/15/2023 10:22 PM EST  Workstation ID: [id number]       I reviewed the patient's new clinical results.    Assessment/Plan:     Active and Resolved Problems  Active Hospital Problems    Diagnosis  POA    **Acute metabolic encephalopathy [G93.41]  Yes    Acute UTI (urinary tract infection) [N39.0]  Yes    Multiple sclerosis [G35]  Yes      Resolved Hospital Problems   No resolved problems to display.       Acute metabolic encephalopathy secondary to UTI  -Patient has continued improvement in her mental status although may have some mild hospital delirium secondary to insomnia  -Continue treatment for UTI; switched IV Rocephin to oral Keflex given urine cultures with E. coli  -Initiate Remeron tonight for sleep to improve her sleep-wake cycle and her overall mental status     Multiple Sclerosis   - on no treatment  - PT/OT recommending home with home health     Obesity  - encourage diet and lifestyle changes  -BMI 55    DVT prophylaxis:  Medical DVT prophylaxis orders are present.     Code status is   Code Status and Medical Interventions:   Ordered at: 11/15/23 2321     Code Status (Patient has no pulse and is not breathing):    CPR (Attempt to Resuscitate)     Medical Interventions (Patient has pulse or is breathing):    Full Support       Plan for disposition: Likely DC home tomorrow    Time: 30 minutes    Signature: Electronically signed by Conner Posadas MD, 11/17/23, 14:44 EST.  [facility] Hospitalist Team     Electronically signed by Conenr Posadas MD at 11/17/23 1448       Consult Notes (last 72 hours)  Notes from 11/17/23 0930 through 11/20/23 0930   No notes of this type exist for this encounter.           Discharge Summary        Conner Posadas MD at 23 1320                       [facility] Medicine Services  Discharge Summary    Date of Service: 2023  Patient Name: Mackenzie Cornell  : 1960  MRN: [id number]    Date of Admission: 11/15/2023  Discharge Diagnosis:   Acute metabolic encephalopathy secondary to UTI  Multiple sclerosis  Morbid obesity    Date of Discharge: 2023  Primary Care Physician: Jimi Chua Sr., MD      Presenting Problem:   Confusion [R41.0]  Urinary tract infection without hematuria, site unspecified [N39.0]  Acute metabolic encephalopathy [G93.41]    Active and Resolved Hospital Problems:  Active Hospital Problems    Diagnosis POA    **Acute metabolic encephalopathy [G93.41] Yes    Acute UTI (urinary tract infection) [N39.0] Yes    Multiple sclerosis [G35] Yes      Resolved Hospital Problems   No resolved problems to display.         Hospital Course     HPI:  Patient is a 63-year-old female who presents the hospital with complaints of mental status changes.  Please see H&P for details.    Hospital Course:  The patient was admitted to the hospital and was found to have an acute UTI.  She was started on broad-spectrum IV antibiotics with Rocephin.  Urine cultures grew E. coli and the patient was transitioned to oral Keflex prior to discharge.  Her mental status improved as her UTI improved.  She also had poor sleep-wake cycle during her hospital stay which led to some hospital delirium.  However she slept well the day prior to discharge and was much more alert and oriented at that time.  She is stable for discharge.  Patient has a mobility limitation that significantly impairs their ability to participate in one or more mobility- related activities of daily living. The patient is able to safely use the walker. The functional mobility deficit can be sufficiently resolved by use of a walker.          DISCHARGE Follow Up Recommendations for labs and diagnostics:  Follow-up with PCP in 1 week      Reasons For Change In Medications and Indications for New Medications:      Day of Discharge     Vital Signs:  Temp:  [98.1 °F (36.7 °C)-98.7 °F (37.1 °C)] 98.1 °F (36.7 °C)  Heart Rate:  [] 101  Resp:  [14-20] 14  BP: (159-188)/(67-87) 159/83    Physical Exam:  Physical Exam   General Appearance:  Alert, cooperative, no distress, appears stated age, morbidly obese  Head:  Normocephalic, without obvious abnormality, atraumatic  Eyes:  PERRL, conjunctiva/corneas clear, EOM's intact, fundi benign, both eyes  Ears:  Normal TM's and external ear canals, both ears  Nose: Nares normal, septum midline, mucosa normal, no drainage or sinus tenderness  Throat: Lips, mucosa, and tongue normal; teeth and gums normal  Neck: Supple, symmetrical, trachea midline, no adenopathy, thyroid: not enlarged, symmetric, no tenderness/mass/nodules, no carotid bruit or JVD  Lungs:   Clear to auscultation bilaterally, respirations unlabored  Heart:  Regular rate and rhythm, S1, S2 normal, no murmur, rub or gallop  Abdomen:  Soft, non-tender, bowel sounds active all four quadrants,  no masses, no organomegaly  Extremities: Extremities normal, atraumatic, no cyanosis or edema  Pulses: 2+ and symmetric  Skin: Skin color, texture, turgor normal, no rashes or lesions  Neurologic: Normal        Pertinent  and/or Most Recent Results     LAB RESULTS:      Lab 11/16/23  1607 11/15/23  2228 11/15/23  2103   WBC 7.10  --  7.40   HEMOGLOBIN 13.3  --  13.5   HEMATOCRIT 40.5  --  40.9   PLATELETS 318  --  313   NEUTROS ABS 4.10  --  4.90   LYMPHS ABS 2.00  --  1.70   MONOS ABS 0.90  --  0.70   EOS ABS 0.10  --  0.00   MCV 87.0  --  86.0   LACTATE  --  1.3  --          Lab 11/18/23  0941 11/17/23  2223 11/16/23  1607 11/15/23  2103   SODIUM  --  140 135* 134*   POTASSIUM 3.4* 3.3* 3.1* 3.5   CHLORIDE  --  102 98 95*   CO2  --  27.0 26.0 27.0   ANION GAP  --  11.0 11.0 12.0   BUN  --  6* 10 9   CREATININE  --  0.57  0.66 0.71   EGFR  --  101.0 97.5 94.5   GLUCOSE  --  106* 135* 106*   CALCIUM  --  8.9 8.9 8.5*   MAGNESIUM  --   --   --  2.1   TSH  --   --   --  1.400         Lab 11/17/23 2223 11/16/23  1607 11/15/23  2103   TOTAL PROTEIN 7.3 7.1 6.7   ALBUMIN 3.5 3.3* 3.4*   GLOBULIN 3.8 3.8 3.3   ALT (SGPT) 22 21 19   AST (SGOT) 27 27 28   BILIRUBIN 0.3 0.5 0.7   ALK PHOS 56 58 56                     Brief Urine Lab Results  (Last result in the past 365 days)        Color   Clarity   Blood   Leuk Est   Nitrite   Protein   CREAT   Urine HCG        11/15/23 2118 Dark Yellow   Turbid   Moderate (2+)   Large (3+)   Positive   100 mg/dL (2+)                 Microbiology Results (last 10 days)       Procedure Component Value - Date/Time    Blood Culture - Blood, Arm, Right [[id number]]  (Normal) Collected: 11/15/23 2233    Lab Status: Preliminary result Specimen: Blood from Arm, Right Updated: 11/17/23 2245     Blood Culture No growth at 2 days    Narrative:      Less than seven (7) mL's of blood was collected.  Insufficient quantity may yield false negative results.    Blood Culture - Blood, Arm, Left [[id number]]  (Normal) Collected: 11/15/23 2222    Lab Status: Preliminary result Specimen: Blood from Arm, Left Updated: 11/17/23 2245     Blood Culture No growth at 2 days    Narrative:      Less than seven (7) mL's of blood was collected.  Insufficient quantity may yield false negative results.    Urine Culture - Urine, Straight Cath [[id number]]  (Abnormal)  (Susceptibility) Collected: 11/15/23 2118    Lab Status: Final result Specimen: Urine from Straight Cath Updated: 11/17/23 0524     Urine Culture >100,000 CFU/mL Escherichia coli    Narrative:      Colonization of the urinary tract without infection is common. Treatment is discouraged unless the patient is symptomatic, pregnant, or undergoing an invasive urologic procedure.    Susceptibility        Escherichia coli      TRUPTI      Ampicillin Resistant      Ampicillin + Sulbactam  Resistant      Cefazolin Susceptible      Cefepime Susceptible      Ceftazidime Susceptible      Ceftriaxone Susceptible      Gentamicin Susceptible      Levofloxacin Susceptible      Nitrofurantoin Susceptible      Piperacillin + Tazobactam Susceptible      Trimethoprim + Sulfamethoxazole Susceptible                                   CT Head Without Contrast    Result Date: 11/15/2023  Impression: Impression: No acute intracranial process Electronically Signed: Marvin Barrera  11/15/2023 10:22 PM EST  Workstation ID: [id number]                 Labs Pending at Discharge:  Pending Labs       Order Current Status    Blood Culture - Blood, Arm, Left Preliminary result    Blood Culture - Blood, Arm, Right Preliminary result            Procedures Performed           Consults:   Consults       No orders found from 10/17/2023 to 11/16/2023.              Discharge Details        Discharge Medications        New Medications        Instructions Start Date   cephalexin 500 MG capsule  Commonly known as: KEFLEX   500 mg, Oral, Every 12 Hours Scheduled             Continue These Medications        Instructions Start Date   Multi For Her pack   1 tablet, Daily               Allergies   Allergen Reactions    Sulfa Antibiotics Hives         Discharge Disposition:     Home or Self Care    Diet:  Hospital:  Diet Order   Procedures    Diet: Regular/House Diet; Texture: Regular Texture (IDDSI 7); Fluid Consistency: Thin (IDDSI 0)         Discharge Activity:         CODE STATUS:  Code Status and Medical Interventions:   Ordered at: 11/15/23 2321     Code Status (Patient has no pulse and is not breathing):    CPR (Attempt to Resuscitate)     Medical Interventions (Patient has pulse or is breathing):    Full Support         No future appointments.    Additional Instructions for the Follow-ups that You Need to Schedule       Ambulatory Referral to Home Health (Hospital)   As directed      Face to Face Visit Date: 11/18/2023   Follow-up  provider for Plan of Care?: I treated the patient in an acute care facility and will not continue treatment after discharge.   Follow-up provider: RODDY CHUA [[id number]]   Reason/Clinical Findings: Physical deconditioning   Describe mobility limitations that make leaving home difficult: Patient is morbidly obese with significant physical deconditioning from recent hospital stay requiring assistance to ambulate short distances   Nursing/Therapeutic Services Requested: Physical Therapy Occupational Therapy   PT orders: Therapeutic exercise Strengthening   Occupational orders: Strengthening Home safety assessment Activities of daily living   Frequency: 1 Week 1        Discharge Follow-up with PCP   As directed       Currently Documented PCP:    Roddy Chua Sr., MD    PCP Phone Number:    [phone]     Follow Up Details: Follow-up with PCP in 1 week                Time spent on Discharge including face to face service:  >30 minutes    Signature: Electronically signed by Conner Posadas MD, 11/18/23, 13:24 EST.  [facility] Hospitalist Team    Electronically signed by Conner Posadas MD at 11/18/23 4110

## 2023-11-21 ENCOUNTER — OFFICE VISIT (OUTPATIENT)
Dept: FAMILY MEDICINE CLINIC | Facility: CLINIC | Age: 63
End: 2023-11-21
Payer: COMMERCIAL

## 2023-11-21 VITALS
SYSTOLIC BLOOD PRESSURE: 124 MMHG | RESPIRATION RATE: 18 BRPM | WEIGHT: 293 LBS | HEIGHT: 64 IN | OXYGEN SATURATION: 95 % | HEART RATE: 90 BPM | BODY MASS INDEX: 50.02 KG/M2 | TEMPERATURE: 98.1 F | DIASTOLIC BLOOD PRESSURE: 70 MMHG

## 2023-11-21 DIAGNOSIS — R73.09 ELEVATED RANDOM BLOOD GLUCOSE LEVEL: ICD-10-CM

## 2023-11-21 DIAGNOSIS — Z76.89 ENCOUNTER TO ESTABLISH CARE: Primary | ICD-10-CM

## 2023-11-21 DIAGNOSIS — S81.802A LEG WOUND, LEFT, INITIAL ENCOUNTER: ICD-10-CM

## 2023-11-21 DIAGNOSIS — Z87.440 HISTORY OF URINARY TRACT INFECTION: ICD-10-CM

## 2023-11-21 DIAGNOSIS — R41.3 MEMORY CHANGE: ICD-10-CM

## 2023-11-21 DIAGNOSIS — Z13.220 SCREENING FOR LIPID DISORDERS: ICD-10-CM

## 2023-11-21 DIAGNOSIS — G35 MULTIPLE SCLEROSIS: ICD-10-CM

## 2023-11-21 DIAGNOSIS — Z11.59 ENCOUNTER FOR HEPATITIS C SCREENING TEST FOR LOW RISK PATIENT: ICD-10-CM

## 2023-11-21 DIAGNOSIS — G93.41 ACUTE METABOLIC ENCEPHALOPATHY: ICD-10-CM

## 2023-11-21 NOTE — PROGRESS NOTES
Office Note     Name: Mackenzie Cornell    : 1960     MRN: [id number]     Chief Complaint  Establish Care    Subjective       History of Present Illness    Transitional Care Follow Up Visit  Subjective     Within 48 business hours after discharge our office contacted her via telephone to coordinate her care and needs.      I reviewed and discussed the details of that call along with the discharge summary, hospital problems, inpatient lab results, inpatient diagnostic studies, and consultation reports with Mackenzie.     Current outpatient and discharge medications have been reconciled for the patient.  Reviewed by: HANK Villegas          2023     6:04 PM   Date of TCM Phone Call   [facility]   Date of Admission 2023   Date of Discharge 2023   Discharge Disposition Home-Health Care [facility]     Risk for Readmission (LACE) Score: 7 (2023  6:00 AM)      Mackenzie Cornell is a 63 y.o. female who presents today for transitional care management visit.     Course During Hospital Stay:  She was recently discharged from [facility] for acute metabolic encephalopathy on 23.       She is here with her  and son.   Consent given to discuss her care while they are present.     Mackenzie reports she is feeling much better today after discharge. She went to the hospital initially due to her family noticing that she was confused and not making sense.       She sometimes has problems putting words and sentences together. Her  reports this came on fast.  Her  reports that confusion is continuing.    She is alert and oriented x three.     During the assessment of the patient I requested that she remember three words.  Att the end of the exam she was only able to recall two of the three words.    Family reports a few instances were confusion and memory issues appear to be a concern:    One episode a few weeks ago, with confusion of hair appt and kennel fee.      Two days ago- taking a shower she said she needed red white and blue peeps.     There are other instances of odd connections beng made.     Connected that a brother  of cancer and she was relating it to a case that the spouse brought- noted last night.     She has a significant medical history of multiple sclerosis and was last seen by Dr. Drew TURNER (He was a professor).  Patient reports she was diagnosed in .  Spouse corrected and confirmed that she was diagnosed in .    She is here with a walker for ambulation and was previously using two canes at home.    She has a history of transient global amnesia    Family history is significant for dementia (father and paternal grandmother).    She has recently completed treatment for urinary tract infection.  Denies any complaints of uti.  Repeat urinalysis ordered today.    History of treatment for bad tick bites - 2017.   She was checked at that time for diseases and was reportedly negative.     Upon exam she was noted to have edema bilaterally and when raising her left pant leg she was noted to have very dry skin with an open wound to her left lower lateral shin area.  Family reports that she bumped it on a brick wall incident only.  The wound is draining serosanguineous fluid.  Image of wound on chart.      Excerpt from [facility] D/C Note:   +++++  HPI:  Patient is a 63-year-old female who presents the hospital with complaints of mental status changes.  Please see H&P for details.     Hospital Course:  The patient was admitted to the hospital and was found to have an acute UTI.  She was started on broad-spectrum IV antibiotics with Rocephin.  Urine cultures grew E. coli and the patient was transitioned to oral Keflex prior to discharge.  Her mental status improved as her UTI improved.  She also had poor sleep-wake cycle during her hospital stay which led to some hospital delirium.  However she slept well the day prior to discharge and was much  more alert and oriented at that time.  She is stable for discharge.  Patient has a mobility limitation that significantly impairs their ability to participate in one or more mobility- related activities of daily living. The patient is able to safely use the walker. The functional mobility deficit can be sufficiently resolved by use of a walker.    Home Health referral placed:   Ambulatory Referral to Home Health ([facility])   As directed        Face to Face Visit Date: 11/18/2023   Follow-up provider for Plan of Care?: I treated the patient in an acute care facility and will not continue treatment after discharge.   Follow-up provider: RODDY CHUA [[id number]]   Reason/Clinical Findings: Physical deconditioning   Describe mobility limitations that make leaving home difficult: Patient is morbidly obese with significant physical deconditioning from recent hospital stay requiring assistance to ambulate short distances   Nursing/Therapeutic Services Requested: Physical Therapy Occupational Therapy   PT orders: Therapeutic exercise Strengthening   Occupational orders: Strengthening Home safety assessment Activities of daily living   Frequency: 1 Week 1      Home.  HH order recom.  Family agreeable. order in.  RW from [facility]       Plan Comments Declined by CRYSTAL, MOODY and Mark.  sent to  supervisor for further follow up.            ++++++    TCM Call significant note:   TCM call completed? Yes   Wrap up additional comments Spouse states pt is still showing s/s of confusion. Educated spouse on s/s of sepsis/infection and when to call 911/go to ER. RN called PCP office as pt has not seen Dr Chua since 2021, and Evelin at PCP office made appt at [place] location for pt to be seen by Yarely Chand tomorrow on 11/21/23. RN called spouse back to inform him of appt, and advised to take pt to ER if he felt pt has worsened since hospital dc. spouse verbalized understanding.   Call end time 1203   ++++++         The following  portions of the patient's history were reviewed and updated as appropriate: allergies, current medications, past family history, past medical history, past social history, past surgical history, and problem list.    Review of Systems    Objective   Physical Exam    Assessment & Plan   Problems Addressed this Visit          Endocrine and Metabolic    Body mass index (BMI) of 50-59.9 in adult       Genitourinary and Reproductive     History of urinary tract infection    Relevant Orders    Phosphorus    Uric Acid       Neuro    Acute metabolic encephalopathy    Relevant Orders    Ambulatory Referral to Neurology (Completed)    Ambulatory Referral to Home Health    MRI Brain With & Without Contrast    Multiple sclerosis    Relevant Orders    Ambulatory Referral to Home Health    MRI Brain With & Without Contrast    Memory change    Relevant Orders    Ambulatory Referral to Home Health    MRI Brain With & Without Contrast    Vitamin B12    Magnesium    Methylmalonic Acid, Serum    Folate    Comprehensive metabolic panel    CBC w AUTO Differential    TSH    T4, free       Other    Leg wound, left, initial encounter    Relevant Orders    Ambulatory Referral to Wound Clinic    Culture, Routine - Swab, Leg, Left     Other Visit Diagnoses       Encounter to establish care    -  Primary    Recent hospitalization at [facility]    Elevated random blood glucose level        Relevant Orders    Hemoglobin A1c    Encounter for hepatitis C screening test for low risk patient        Relevant Orders    Hepatitis C antibody    Screening for lipid disorders        Relevant Orders    Lipid Panel With LDL / HDL Ratio          Diagnoses         Codes Comments    Encounter to establish care    -  Primary ICD-10-CM: Z76.89  ICD-9-CM: V65.8 Recent hospitalization at [facility]    Memory change     ICD-10-CM: R41.3  ICD-9-CM: 780.93 Further evaluation/workup for new evolving dementia versus multiple sclerosis exacerbation.     Leg wound, left, initial encounter     ICD-10-CM: S81.802A  ICD-9-CM: 894.0 Wound care ordered.  Wound culture ordered.    Elevated random blood glucose level     ICD-10-CM: R73.09  ICD-9-CM: 790.29     Acute metabolic encephalopathy     ICD-10-CM: G93.41  ICD-9-CM: 348.31     Multiple sclerosis     ICD-10-CM: G35  ICD-9-CM: 340 She does not take medication and has not seen neurology in years.    History of urinary tract infection     ICD-10-CM: Z87.440  ICD-9-CM: V13.02 Resistant to Ampicillin and Ampicillin/Subactam.   Repeat urinalysis today.  Patient finished antibiotics.    Encounter for hepatitis C screening test for low risk patient     ICD-10-CM: Z11.59  ICD-9-CM: V73.89     Body mass index (BMI) of 50-59.9 in adult     ICD-10-CM: Z68.43  ICD-9-CM: V85.43     Screening for lipid disorders     ICD-10-CM: Z13.220  ICD-9-CM: V77.91             Allergies   Allergen Reactions    Sulfa Antibiotics Hives         Current Outpatient Medications:     Multiple Vitamins-Minerals (MULTI FOR HER) pack, 1 tablet Daily., Disp: , Rfl:     Past Medical History:   Diagnosis Date    Multiple sclerosis     Transient global amnesia     about 10 years, and resolved within 1 day    Urinary tract infection        Review of Systems   Constitutional:  Positive for activity change. Negative for appetite change, chills, fever, unexpected weight gain and unexpected weight loss.   HENT: Negative.     Eyes:  Positive for visual disturbance (Eyeglasses).   Respiratory:  Negative for cough, shortness of breath and wheezing.    Cardiovascular:  Positive for leg swelling. Negative for chest pain and palpitations.   Gastrointestinal: Negative.    Genitourinary:  Negative for dysuria.   Musculoskeletal:  Positive for arthralgias and myalgias.   Skin:  Positive for dry skin and wound.        Excess facial hair growth.   Neurological:  Positive for weakness, memory problem and confusion. Negative for dizziness, tremors, seizures, syncope,  "facial asymmetry, speech difficulty, numbness and headache.   Psychiatric/Behavioral:  Negative for sleep disturbance, suicidal ideas, depressed mood and stress. The patient is not nervous/anxious.        Social History     Socioeconomic History    Marital status:    Tobacco Use    Smoking status: Never    Smokeless tobacco: Never   Vaping Use    Vaping Use: Never used   Substance and Sexual Activity    Alcohol use: Yes     Comment: occasional beer.    Drug use: Never    Sexual activity: Defer       Family History   Problem Relation Age of Onset    Hypertension Mother     Heart disease Mother         A-fib    Other Father         Alzheimers, dementia    Asthma Brother     No Known Problems Daughter     Asthma Son     Other Son         sleep apnea, narcelpsy    Leukemia Maternal Grandfather     Dementia Paternal Grandmother     Corneal ulcer Other     Parkinsonism Other            11/21/2023     4:16 PM   PHQ-2/PHQ-9 Depression Screening   Little Interest or Pleasure in Doing Things 0-->not at all   Feeling Down, Depressed or Hopeless 0-->not at all   PHQ-9: Brief Depression Severity Measure Score 0       Fall Risk Screen:  SAWYER Fall Risk Assessment has not been completed.      Objective     /70 (BP Location: Right arm, Patient Position: Sitting, Cuff Size: Adult)   Pulse 90   Temp 98.1 °F (36.7 °C)   Resp 18   Ht 162.6 cm (64.02\")   Wt 133 kg (294 lb)   SpO2 95%   BMI 50.44 kg/m²     BP Readings from Last 2 Encounters:   11/21/23 124/70   11/18/23 159/83       Wt Readings from Last 2 Encounters:   11/21/23 133 kg (294 lb)   11/18/23 127 kg (279 lb)                Physical Exam  Vitals and nursing note reviewed.   Constitutional:       General: She is not in acute distress.     Appearance: She is well-groomed. She is obese. She is not ill-appearing, toxic-appearing or diaphoretic.   HENT:      Head: Normocephalic and atraumatic.      Comments: Excess facial hair noted     Nose: Nose normal. "   Eyes:      General: Lids are normal. Vision grossly intact. No allergic shiner, visual field deficit or scleral icterus.     Extraocular Movements: Extraocular movements intact.      Right eye: Normal extraocular motion.      Left eye: Normal extraocular motion.      Conjunctiva/sclera: Conjunctivae normal.      Pupils: Pupils are equal, round, and reactive to light. Pupils are equal.      Right eye: Pupil is not sluggish.      Left eye: Pupil is not sluggish.   Neck:      Vascular: No carotid bruit.   Cardiovascular:      Rate and Rhythm: Normal rate and regular rhythm.      Heart sounds: Normal heart sounds.      Comments: Decreased pulses noted lower extremities.  Pulmonary:      Effort: Pulmonary effort is normal.      Breath sounds: Normal breath sounds and air entry.   Abdominal:      General: Abdomen is protuberant.   Musculoskeletal:      Cervical back: Normal range of motion and neck supple.      Right lower le+      Left lower le+      Comments: Walker for ambulation.   Feet:      Right foot:      Skin integrity: Dry skin present.      Toenail Condition: Right toenails are abnormally thick and long.      Left foot:      Skin integrity: Dry skin present.      Toenail Condition: Left toenails are abnormally thick and long.   Skin:     General: Skin is warm and dry.      Coloration: Skin is pale.      Findings: Lesion present.             Comments: Wound noted to area marked.  Image on chart.   Neurological:      Mental Status: She is alert and oriented to person, place, and time. Mental status is at baseline.      Cranial Nerves: Dysarthria present. No cranial nerve deficit or facial asymmetry.      Sensory: Sensation is intact.      Motor: Weakness present.      Coordination: Finger-Nose-Finger Test abnormal.   Psychiatric:         Attention and Perception: Attention and perception normal.         Mood and Affect: Affect is flat.         Speech: Speech normal.         Behavior: Behavior normal.  Behavior is cooperative.         Thought Content: Thought content normal.         Cognition and Memory: She exhibits impaired recent memory.         Judgment: Judgment normal.       Physical Exam   Head   Head comments: Excess facial hair noted        Result Review  Labs  Result Review  Imaging  Med Tab  Media    The following data was reviewed by: HANK Villegas on 11/21/2023:  CMP          11/16/2023    16:07 11/17/2023    22:23 11/18/2023    09:41   CMP   Glucose 135  106     BUN 10  6     Creatinine 0.66  0.57     EGFR 97.5  101.0     Sodium 135  140     Potassium 3.1  3.3  3.4    Chloride 98  102     Calcium 8.9  8.9     Total Protein 7.1  7.3     Albumin 3.3  3.5     Globulin 3.8  3.8     Total Bilirubin 0.5  0.3     Alkaline Phosphatase 58  56     AST (SGOT) 27  27     ALT (SGPT) 21  22     Albumin/Globulin Ratio 0.9  0.9     BUN/Creatinine Ratio 15.2  10.5     Anion Gap 11.0  11.0       CBC w/diff          11/15/2023    21:03 11/16/2023    16:07   CBC w/Diff   WBC 7.40  7.10    RBC 4.76  4.65    Hemoglobin 13.5  13.3    Hematocrit 40.9  40.5    MCV 86.0  87.0    MCH 28.5  28.6    MCHC 33.1  32.9    RDW 14.4  14.3    Platelets 313  318    Neutrophil Rel % 65.9  57.9    Lymphocyte Rel % 23.0  27.5    Monocyte Rel % 9.6  12.4    Eosinophil Rel % 0.6  1.5    Basophil Rel % 0.9  0.7        TSH          11/15/2023    21:03   TSH   TSH 1.400          UA          11/15/2023    21:18   Urinalysis   Squamous Epithelial Cells, UA 3-6    Specific Gravity, UA 1.017    Ketones, UA 15 mg/dL (1+)    Blood, UA Moderate (2+)    Leukocytes, UA Large (3+)    Nitrite, UA Positive    RBC, UA 11-20    WBC, UA Too Numerous to Count    Bacteria, UA 4+      Urine Culture          11/15/2023    21:18   Urine Culture   Urine Culture >100,000 CFU/mL Escherichia coli        Magnesium 2.1  Potassium 3.4  TSH 1.4  Carbon Monoxide 3.2 11/15/23  Lactate 1.3 11/15/23  BUN 6  Glucose 106      CT Head Without Contrast    Result Date:  11/15/2023  Impression: Impression: No acute intracranial process Electronically Signed: Marvin Barrera  11/15/2023 10:22 PM EST  Workstation ID: [id number]   Data reviewed : Recent hospitalization notes BHFAdmission      Assessment and Plan     Procedures  Plan {CC Problem List  Visit Diagnosis  ROS  Review (Popup)  Health Maintenance  Quality  BestPractice  Medications  SmartSets  SnapShot Encounters  Media   Diagnoses and all orders for this visit:    1. Encounter to establish care (Primary)  Comments:  Recent hospitalization at [facility]    2. Memory change  Comments:  Further evaluation/workup for new evolving dementia versus multiple sclerosis exacerbation.  Orders:  -     Ambulatory Referral to Home Health  -     MRI Brain With & Without Contrast  -     Cancel: Vitamin B12  -     Cancel: TSH  -     Cancel: T4, free  -     Cancel: Methylmalonic Acid, Serum  -     Cancel: Folate  -     Cancel: Magnesium  -     Cancel: CBC w AUTO Differential  -     Cancel: Comprehensive metabolic panel  -     Vitamin B12  -     Magnesium  -     Methylmalonic Acid, Serum  -     Folate  -     Comprehensive metabolic panel  -     CBC w AUTO Differential  -     TSH  -     T4, free    3. Leg wound, left, initial encounter  Comments:  Wound care ordered.  Wound culture ordered.  Orders:  -     Ambulatory Referral to Wound Clinic  -     Culture, Routine - Swab, Leg, Left    4. Elevated random blood glucose level  -     Cancel: Hemoglobin A1c  -     Hemoglobin A1c    5. Acute metabolic encephalopathy  -     Cancel: POCT urinalysis dipstick, multipro  -     Ambulatory Referral to Neurology  -     Ambulatory Referral to Home Health  -     MRI Brain With & Without Contrast    6. Multiple sclerosis  Comments:  She does not take medication and has not seen neurology in years.  Orders:  -     Cancel: POCT urinalysis dipstick, multipro  -     Ambulatory Referral to Home Health  -     MRI Brain With & Without Contrast    7.  History of urinary tract infection  Comments:  Resistant to Ampicillin and Ampicillin/Subactam.   Repeat urinalysis today.  Patient finished antibiotics.  Orders:  -     Cancel: Uric Acid  -     Phosphorus  -     Uric Acid    8. Encounter for hepatitis C screening test for low risk patient  -     Hepatitis C antibody    9. Body mass index (BMI) of 50-59.9 in adult    10. Screening for lipid disorders  -     Lipid Panel With LDL / HDL Ratio        Problem List Items Addressed This Visit          Endocrine and Metabolic    Body mass index (BMI) of 50-59.9 in adult       Genitourinary and Reproductive     History of urinary tract infection    Overview     Resistant to Ampicillin and Ampicillin/Subactam.   Repeat urinalysis today.  Patient finished antibiotics.         Relevant Orders    Phosphorus    Uric Acid       Neuro    Acute metabolic encephalopathy    Relevant Orders    Ambulatory Referral to Neurology (Completed)    Ambulatory Referral to Home Health    MRI Brain With & Without Contrast    Multiple sclerosis    Overview     Last seen by Dr. Drew Quinones - [facility] (He was a professor).  Patient reports she was diagnosed in 2020.  Spouse corrected and confirmed that she was diagnosed in 2003.  She does not take medication for multiple sclerosis.  Patient refuses referral to Dr. Seipel.  Request referral to Dr. Quinones.  She agrees to be seen by [place] neurology if Dr. Quinones does not accept referral.  Previously seen by Dr. Figueroa.         Relevant Orders    Ambulatory Referral to Home Health    MRI Brain With & Without Contrast    Memory change    Overview     Further evaluation/workup for new evolving dementia versus multiple sclerosis exacerbation.  Agrees to follow-up with neurologist previously treated for MS.  If unable to confirm care will be referred to [place] [facility].  MRI ordered.         Relevant Orders    Ambulatory Referral to Home Health    MRI Brain With & Without Contrast    Vitamin B12     Magnesium    Methylmalonic Acid, Serum    Folate    Comprehensive metabolic panel    CBC w AUTO Differential    TSH    T4, free       Other    Leg wound, left, initial encounter    Overview     Wound care ordered.  Wound culture ordered.         Relevant Orders    Ambulatory Referral to Wound Clinic    Culture, Routine - Swab, Leg, Left     Other Visit Diagnoses       Encounter to establish care    -  Primary    Recent hospitalization at [facility]    Elevated random blood glucose level        Relevant Orders    Hemoglobin A1c    Encounter for hepatitis C screening test for low risk patient        Relevant Orders    Hepatitis C antibody    Screening for lipid disorders        Relevant Orders    Lipid Panel With LDL / HDL Ratio             Follow Up {Instructions Charge Capture  Follow-up Communications   Wrapup Tab  Return in about 4 weeks (around 12/19/2023).   There are no Patient Instructions on file for this visit.   Patient was given instructions and counseling regarding her condition or for health maintenance advice. Please see specific information pulled into the AVS if appropriate.  Hand hygiene was performed during entrance to exam room and following assessment of patient. This document is intended for medical expert use only.     EMR Dragon/Transcription disclaimer:   Much of this encounter note is an electronic transcription/translation of spoken language to printed text. The electronic translation of spoken language may permit erroneous, or at times, nonsensical words or phrases to be inadvertently transcribed.      HANK Villegas, FNP-C  [address]  [facility]  [address]  [place] [address] [phone]

## 2023-11-22 PROBLEM — Z87.440 HISTORY OF URINARY TRACT INFECTION: Status: ACTIVE | Noted: 2023-11-22

## 2023-11-22 PROBLEM — Z81.8: Status: ACTIVE | Noted: 2023-11-22

## 2023-11-22 PROBLEM — S81.802A LEG WOUND, LEFT, INITIAL ENCOUNTER: Status: ACTIVE | Noted: 2023-11-22

## 2023-11-22 PROBLEM — Z87.440 HISTORY OF URINARY TRACT INFECTION: Status: ACTIVE | Noted: 2023-11-16

## 2023-11-22 PROBLEM — R41.3 MEMORY CHANGE: Status: ACTIVE | Noted: 2023-11-22

## 2023-11-22 NOTE — DISCHARGE PLACEMENT REQUEST
"Mackenzie Bernard (63 y.o. Female)       Date of Birth   1960    Social Security Number       Address   [address] [place] [address] [place] [facility]    Home Phone   [phone]    MRN   [id number]       Nondenominational   None    Marital Status                               Admission Date   11/15/23    Admission Type   Emergency    Admitting Provider   Jocy Donaldson MD    Attending Provider       Department, Room/Bed   [facility] 3A MEDICAL INPATIENT, 312/1       Discharge Date   11/18/2023    Discharge Disposition   Home or Self Care    Discharge Destination                                 Attending Provider: (none)   Allergies: Sulfa Antibiotics    Isolation: None   Infection: None   Code Status: Prior    Ht: 162.6 cm (64.02\")   Wt: 127 kg (279 lb)    Admission Cmt: None   Principal Problem: Acute metabolic encephalopathy [G93.41]                   Active Insurance as of 11/15/2023       Primary Coverage       Payor Plan Insurance Group Employer/Plan Group    [employer]CIAL A[employer] [id number]       Payor Plan Address Payor Plan Phone Number Payor Plan Fax Number Effective Dates    [address] [phone]  1/1/2019 - None Entered    [facility] [id number]         Subscriber Name Subscriber Birth Date Member ID       FLORENCIO BERNARD 1960 [id number]                     Emergency Contacts        (Rel.) Home Phone Work Phone Mobile Phone    Florencio Bernard (Spouse) -- -- [phone]    DivineJes platt (Daughter) -- -- [phone]                "

## 2023-11-27 ENCOUNTER — TELEPHONE (OUTPATIENT)
Dept: FAMILY MEDICINE CLINIC | Facility: CLINIC | Age: 63
End: 2023-11-27

## 2023-11-27 LAB
BACTERIA SPEC CULT: ABNORMAL
BACTERIA SPEC CULT: ABNORMAL
MICROORGANISM/AGENT SPEC: ABNORMAL
OTHER ANTIBIOTIC SUSC ISLT: ABNORMAL

## 2023-11-27 NOTE — TELEPHONE ENCOUNTER
Caller: Jes Haskins    Relationship: Emergency Contact    Best call back number: [phone]     What test was performed: URINALYSIS AND SWAB    When was the test performed: 11/21/23    Where was the test performed: IN OFFICE    Additional notes: PLEASE ADVISE

## 2023-11-28 ENCOUNTER — TELEPHONE (OUTPATIENT)
Dept: FAMILY MEDICINE CLINIC | Facility: CLINIC | Age: 63
End: 2023-11-28
Payer: COMMERCIAL

## 2023-11-28 ENCOUNTER — READMISSION MANAGEMENT (OUTPATIENT)
Dept: CALL CENTER | Facility: HOSPITAL | Age: 63
End: 2023-11-28
Payer: COMMERCIAL

## 2023-11-28 DIAGNOSIS — L08.9 WOUND INFECTION: Primary | ICD-10-CM

## 2023-11-28 DIAGNOSIS — T14.8XXA WOUND INFECTION: Primary | ICD-10-CM

## 2023-11-28 RX ORDER — LEVOFLOXACIN 750 MG/1
750 TABLET, FILM COATED ORAL DAILY
Qty: 7 TABLET | Refills: 0 | Status: SHIPPED | OUTPATIENT
Start: 2023-11-28

## 2023-11-28 NOTE — TELEPHONE ENCOUNTER
Caller: Jes Haskins     Relationship: DAUGHTER    Best call back number:     [phone]        What is your medical concern?     OPEN WOUND ON LEFT LEG    THERE WAS A SWAB DONE ON PATIENTS LEG WHEN SHE WAS IN THE OFFICE LAST WEEK AND THEY HAVE NOT HEARD FROM ANYONE IN REGARDS TO THE TEST RESULTS.    THE DAUGHTER STATES THAT THE LEG IS RED AND IS CONCERNED THAT IT MAYBE GETTING INFECTED.    How long has this issue been going on? OVER A WEEK    Is your provider already aware of this issue? YES    THEY HAVE CALLED THE OFFICE TO GET TEST RESULTS AND WAS TOLD ONLY A DOCTOR COULD READ THE RESULTS AND THEY ARE BECOMING VERY CONCERNED AND NEED TO KNOW WHAT TO DO.    PLEASE CALL AND ADVISE.

## 2023-11-28 NOTE — TELEPHONE ENCOUNTER
Patient daughter called and wanted to know about the results of her culture from her leg. She said that leg is looking a little worse with red around it and was not sure if she needed a antibiotic. Routing to on call provider PCP out of office.

## 2023-11-28 NOTE — OUTREACH NOTE
Medical Week 2 Survey      Flowsheet Row Responses   [facility] patient discharged from? [facility]   Does the patient have one of the following disease processes/diagnoses(primary or secondary)? Other   Week 2 attempt successful? Yes   Call start time 1237   Discharge diagnosis Acute metabolic encephalopathy   Call end time 1251   Meds reviewed with patient/caregiver? Yes   Is the patient having any side effects they believe may be caused by any medication additions or changes? No   Does the patient have all medications ordered at discharge? No   Nursing Interventions No intervention needed  [Patient reports she was unaware she needed to pick antibiotic up at pharmacy after d/c. She will  today]   Does the patient have a primary care provider?  Yes   Does the patient have an appointment with their PCP within 7 days of discharge? Yes   Comments regarding PCP PCP follow up 11/21/23   What is preventing the patient from scheduling follow up appointments within 7 days of discharge? Haven't had time, Earlier appointment not available   Nursing Interventions Verified appointment date/time/provider   Has the patient kept scheduled appointments due by today? Yes   Has home health visited the patient within 72 hours of discharge? No   Home health interventions Other  [Message routed to ]   Psychosocial issues? No   Did the patient receive a copy of their discharge instructions? No   Nursing interventions Reviewed instructions with patient   What is the patient's perception of their health status since discharge? Improving   Is the patient/caregiver able to teach back signs and symptoms related to disease process for when to call PCP? Yes   Is the patient/caregiver able to teach back signs and symptoms related to disease process for when to call 911? Yes   Is the patient/caregiver able to teach back the hierarchy of who to call/visit for symptoms/problems? PCP, Specialist, Home health nurse, Urgent Care, ED, 911  Yes   If the patient is a current smoker, are they able to teach back resources for cessation? Not a smoker   Week 2 Call Completed? Yes   Is the patient interested in additional calls from an ambulatory ? No   Would this patient benefit from a Referral to [facility] Social Work? No   Call end time 1883            Serene CASTRO - Registered Nurse

## 2023-11-28 NOTE — TELEPHONE ENCOUNTER
patient was seen by PCP on 11/21/2023, patient had a wound on her left leg that was cultured.  Patient was also given a referral to wound clinic.  Patient called stating that her wound looked a little worse    Wound culture came back showing sensitivity to cefepime, ciprofloxacin, gentamicin, imipenem, levofloxacin, meropenem, tetracycline, tobramycin and trimethoprim/sulfa.  Patient has an allergy to sulfa antibiotics (hives)    Sending in course of levofloxacin 750mg daily for 7 days

## 2023-11-28 NOTE — TELEPHONE ENCOUNTER
PER DR CORNEJO: patient was seen by PCP on 11/21/2023, patient had a wound on her left leg that was cultured.  Patient was also given a referral to wound clinic.  Patient called stating that her wound looked a little worse     Wound culture came back showing sensitivity to cefepime, ciprofloxacin, gentamicin, imipenem, levofloxacin, meropenem, tetracycline, tobramycin and trimethoprim/sulfa.  Patient has an allergy to sulfa antibiotics (hives)     Sending in course of levofloxacin 750mg daily for 7 days    Information given to patient and daughter

## 2023-11-28 NOTE — TELEPHONE ENCOUNTER
Yarely boudreaux is out all week and is not checking messages. This is routed to on call to advise.

## 2023-12-14 NOTE — PROGRESS NOTES
Office Note     Name: Mackenzie Cornell    : 1960     MRN: [id number]     Chief Complaint  Wound Check    Subjective     Mackenzie Cornell presents to [facility] for a follow up visit for  wound check.  Mackenzie was seen in our office on 2023. Notes from that visit are listed below:    2023  Mackenzie is here for follow-up to multiple medical conditions.  She was recently admitted for metabolic encephalopathy due to urinary tract infection.  She has a significant history of multiple sclerosis without exacerbation.  Previous concern of family history of dementia with changes to memory and behavior noted at last visit.  She is here today with her spouse and appears to be doing quite well.    Upon exam she was noted to have edema bilaterally and when raising her left pant leg she was noted to have very dry skin with an open wound to her left lower lateral shin area.  Family reports that she bumped it on a brick wall incident only.  The wound is draining serosanguineous fluid.  Image of wound on chart.    She denies any history of dysuria foul odor or other concerns.  Urine culture if needed and urinalysis will be collected today.    She was noted to have a wound to her left lower leg anterior aspect with medication prescribed and culture specific antibiotic ordered.  Culture was positive for Pseudomonas aeruginosa and was placed on Levaquin.  Spouse reports wound bed change 3-4 days ago per patient and family. Pain not remarkeable. Keeps open to air at night. Using Dial antibacterial soap to clean.  Topical antibacterial to dress with sterile gauze, silvex wound gel to wound bed.and cover.  Has been leaving open to air for the past week.        23 - Wound referral was placed - Patient reports no appointment or wound care has been completed.   Wound appears to be evolving (image on chart).  Wound culture obtained, xray ordered to r/o osteomyelitis. Surrounding cellulitis  noted. Negative gali's sign bilaterally.      Home Health - No company would accept their insurance.     Dry skin bilaterally, hr ok, lung sounds ok     Recommend compression stockings.  Elevate legs while resting.  Apply moisturizing cream daily.  Follow-up with wound care for management of wound.  Chronic care management ordered.  Lab work ordered at last visit not completed and agrees to follow-up with completion of blood work.  Referral placed to [facility] for management of multiple sclerosis.  Wound care referral placed to [facility] wound care.    History of Present Illness    Current Outpatient Medications:     Multiple Vitamins-Minerals (MULTI FOR HER) pack, 1 tablet Daily., Disp: , Rfl:     Allergies   Allergen Reactions    Sulfa Antibiotics Hives       Past Surgical History:   Procedure Laterality Date    COLONOSCOPY      TONSILLECTOMY      WISDOM TOOTH EXTRACTION          Family History   Problem Relation Age of Onset    Hypertension Mother     Heart disease Mother         A-fib    Other Father         Alzheimers, dementia    Asthma Brother     No Known Problems Daughter     Asthma Son     Other Son         sleep apnea, narcelpsy    Leukemia Maternal Grandfather     Dementia Paternal Grandmother     Corneal ulcer Other     Parkinsonism Other             11/21/2023     4:16 PM   PHQ-2/PHQ-9 Depression Screening   Little Interest or Pleasure in Doing Things 0-->not at all   Feeling Down, Depressed or Hopeless 0-->not at all   PHQ-9: Brief Depression Severity Measure Score 0       Review of Systems   Constitutional:  Positive for activity change. Negative for appetite change, chills, fever, unexpected weight gain and unexpected weight loss.   HENT: Negative.     Eyes:  Positive for visual disturbance (Eyeglasses).   Respiratory:  Negative for cough, shortness of breath and wheezing.    Cardiovascular:  Positive for leg swelling. Negative for chest pain and palpitations.  "  Gastrointestinal: Negative.    Genitourinary:  Negative for dysuria.   Musculoskeletal:  Positive for arthralgias and myalgias.   Skin:  Positive for dry skin and wound.        Excess facial hair growth.   Neurological:  Positive for weakness, memory problem and confusion. Negative for dizziness, tremors, seizures, syncope, facial asymmetry, speech difficulty, numbness and headache.   Psychiatric/Behavioral:  Negative for sleep disturbance, suicidal ideas, depressed mood and stress. The patient is not nervous/anxious.        Objective     /78 (BP Location: Right arm, Patient Position: Sitting, Cuff Size: Large Adult)   Pulse 92   Temp 98.3 °F (36.8 °C) (Temporal)   Resp 18   Ht 162.2 cm (63.86\")   Wt 129 kg (285 lb)   SpO2 99%   BMI 49.14 kg/m²     BP Readings from Last 2 Encounters:   12/15/23 142/78   11/21/23 124/70       Wt Readings from Last 2 Encounters:   12/15/23 129 kg (285 lb)   11/21/23 133 kg (294 lb)          Physical Exam  Vitals and nursing note reviewed.   Constitutional:       General: She is not in acute distress.     Appearance: Normal appearance. She is well-groomed. She is obese. She is not ill-appearing, toxic-appearing or diaphoretic.   HENT:      Head: Normocephalic and atraumatic.   Eyes:      General: Lids are normal. Vision grossly intact. Gaze aligned appropriately.      Extraocular Movements: Extraocular movements intact.      Pupils: Pupils are equal, round, and reactive to light.   Neck:      Vascular: No carotid bruit.   Cardiovascular:      Rate and Rhythm: Normal rate and regular rhythm.      Pulses:           Dorsalis pedis pulses are 1+ on the right side and 1+ on the left side.        Posterior tibial pulses are 1+ on the right side and 1+ on the left side.      Heart sounds: Normal heart sounds. No murmur heard.  Pulmonary:      Effort: Pulmonary effort is normal.      Breath sounds: Normal breath sounds and air entry. No stridor or decreased air movement. "   Musculoskeletal:      Cervical back: Normal range of motion and neck supple.      Right lower leg: Edema present.      Left lower leg: Edema present.      Comments: Ambulating with cane. No longer using walker.   Skin:     General: Skin is warm and dry.      Findings: Erythema and wound present.      Comments: Image on chart of left lower extremity wound and skin condition.  Shiny, taut skin noted bilaterally to lower legs with scaling dry texture.    Neurological:      Mental Status: She is alert and oriented to person, place, and time.   Psychiatric:         Attention and Perception: Attention and perception normal.         Mood and Affect: Mood and affect normal.         Speech: Speech normal.         Behavior: Behavior normal. Behavior is cooperative.         Thought Content: Thought content normal.       Derm Physical Exam  Result Review :{ Labs  Result Review  Imaging  Med Tab  Media    Assessment and Plan      {CC Problem List  Visit Diagnosis  ROS  Review (Popup)  Health Maintenance  Quality  BestPractice  Medications  SmartSets  SnapShot Encounters  Media   Problems Addressed this Visit          Endocrine and Metabolic    Body mass index (BMI) of 50-59.9 in adult       Genitourinary and Reproductive     History of urinary tract infection    Relevant Orders    Ambulatory Referral to Chronic Care Management Disease Education, Barriers to Care, Care Coordination, Preventative Care, High Risk for ED/Readmission, Medication Adherence, Advanced Care Planning, Caregiving/Support, MyChart Home Monitoring       Neuro    Acute metabolic encephalopathy    Relevant Orders    Ambulatory Referral to Chronic Care Management Disease Education, Barriers to Care, Care Coordination, Preventative Care, High Risk for ED/Readmission, Medication Adherence, Advanced Care Planning, Caregiving/Support, MyChart Home Monitoring    Multiple sclerosis    Relevant Orders    Ambulatory Referral to Neurology (Completed)     Ambulatory Referral to Chronic Care Management Disease Education, Barriers to Care, Care Coordination, Preventative Care, High Risk for ED/Readmission, Medication Adherence, Advanced Care Planning, Caregiving/Support, MyChart Home Monitoring    Memory change    Relevant Orders    Ambulatory Referral to Neurology (Completed)    Ambulatory Referral to Chronic Care Management Disease Education, Barriers to Care, Care Coordination, Preventative Care, High Risk for ED/Readmission, Medication Adherence, Advanced Care Planning, Caregiving/Support, MyChart Home Monitoring     Other Visit Diagnoses       Wound infection    -  Primary    Wound care referral placed to [facility] wound.    Relevant Orders    Culture, Routine - Swab, Leg, Left    Ambulatory Referral to Wound Clinic (Completed)    XR Tibia Fibula 2 View Left    Elevated blood pressure reading without diagnosis of hypertension        Encouraged low salt healthy heart diet.  Recheck blood pressure next appointment.  Blood work ordered.    Bilateral lower extremity edema        Recommend elevate while resting.  Compression knee-high socks.  Further evaluation pending lab work.    Relevant Orders    Compression Stockings          Diagnoses         Codes Comments    Wound infection    -  Primary ICD-10-CM: T14.8XXA, L08.9  ICD-9-CM: 958.3 Wound care referral placed to [facility] wound.    Multiple sclerosis     ICD-10-CM: G35  ICD-9-CM: 340 Referral placed to [place] MS clinic.    Memory change     ICD-10-CM: R41.3  ICD-9-CM: 780.93 Family history of dementia.  Evaluation ordered for MS clinic at [place].    History of urinary tract infection     ICD-10-CM: Z87.440  ICD-9-CM: V13.02 Repeat urinalysis with culture if needed.    Acute metabolic encephalopathy     ICD-10-CM: G93.41  ICD-9-CM: 348.31     Elevated blood pressure reading without diagnosis of hypertension     ICD-10-CM: R03.0  ICD-9-CM: 796.2 Encouraged low salt healthy heart diet.  Recheck blood  pressure next appointment.  Blood work ordered.    Body mass index (BMI) of 50-59.9 in adult     ICD-10-CM: Z68.43  ICD-9-CM: V85.43     Bilateral lower extremity edema     ICD-10-CM: R60.0  ICD-9-CM: 782.3 Recommend elevate while resting.  Compression knee-high socks.  Further evaluation pending lab work.           Procedures    Problem List Items Addressed This Visit          Endocrine and Metabolic    Body mass index (BMI) of 50-59.9 in adult       Genitourinary and Reproductive     History of urinary tract infection    Overview     Resistant to Ampicillin and Ampicillin/Subactam.   Repeat urinalysis today.  Patient finished antibiotics.         Relevant Orders    Ambulatory Referral to Chronic Care Management Disease Education, Barriers to Care, Care Coordination, Preventative Care, High Risk for ED/Readmission, Medication Adherence, Advanced Care Planning, Caregiving/Support, MyChart Home Monitoring       Neuro    Acute metabolic encephalopathy    Relevant Orders    Ambulatory Referral to Chronic Care Management Disease Education, Barriers to Care, Care Coordination, Preventative Care, High Risk for ED/Readmission, Medication Adherence, Advanced Care Planning, Caregiving/Support, MyChart Home Monitoring    Multiple sclerosis    Overview     Last seen by Dr. Drew Quinones - [facility] (He was a professor).  Patient reports she was diagnosed in 2020.  Spouse corrected and confirmed that she was diagnosed in 2003.  She does not take medication for multiple sclerosis.  Patient refuses referral to Dr. Seipel.  Request referral to Dr. Quinones.  She agrees to be seen by [place] neurology if Dr. Quinones does not accept referral.  Previously seen by Dr. Figueroa.         Relevant Orders    Ambulatory Referral to Neurology (Completed)    Ambulatory Referral to Chronic Care Management Disease Education, Barriers to Care, Care Coordination, Preventative Care, High Risk for ED/Readmission, Medication Adherence, Advanced Care  Planning, Caregiving/Support, MyChart Home Monitoring    Memory change    Overview     Further evaluation/workup for new evolving dementia versus multiple sclerosis exacerbation.  Agrees to follow-up with neurologist previously treated for MS.  If unable to confirm care will be referred to [place] neurology.  MRI ordered.         Relevant Orders    Ambulatory Referral to Neurology (Completed)    Ambulatory Referral to Chronic Care Management Disease Education, Barriers to Care, Care Coordination, Preventative Care, High Risk for ED/Readmission, Medication Adherence, Advanced Care Planning, Caregiving/Support, MyChart Home Monitoring     Other Visit Diagnoses       Wound infection    -  Primary    Wound care referral placed to [facility] wound.    Relevant Orders    Culture, Routine - Swab, Leg, Left    Ambulatory Referral to Wound Clinic (Completed)    XR Tibia Fibula 2 View Left    Elevated blood pressure reading without diagnosis of hypertension        Encouraged low salt healthy heart diet.  Recheck blood pressure next appointment.  Blood work ordered.    Bilateral lower extremity edema        Recommend elevate while resting.  Compression knee-high socks.  Further evaluation pending lab work.    Relevant Orders    Compression Stockings                 Wrapup Tab  Return in about 4 weeks (around 1/12/2024) for Next scheduled follow up.     Patient Instructions   Referral for MS, memory change:   [facility]  [phone] ([phone])  [email or web address]    Wound Management. - Gaurav at [facility]    Continue daily Dial Soap cleansing, apply Bacitracin topical antibiotic daily.     XR leg today.     Urinalysis today     [facility] for blood work.     Chronic Care Management - Yris Donaldson.     Compression stockings  knee hi (20-35mmHg pressure).                      Patient was given instructions and counseling  regarding her condition or for health maintenance advice. Please see specific information pulled into the AVS if appropriate.  Hand hygiene was performed during entrance to exam room and following assessment of patient. This document is intended for medical expert use only.     EMR Dragon/Transcription disclaimer:   Much of this encounter note is an electronic transcription/translation of spoken language to printed text. The electronic translation of spoken language may permit erroneous, or at times, nonsensical words or phrases to be inadvertently transcribed.      HANK Villegas, FNP-C  [address]  [facility]  [address] [place] [address] [address]  [place] [address] [phone]

## 2023-12-15 ENCOUNTER — OFFICE VISIT (OUTPATIENT)
Dept: FAMILY MEDICINE CLINIC | Facility: CLINIC | Age: 63
End: 2023-12-15
Payer: COMMERCIAL

## 2023-12-15 VITALS
OXYGEN SATURATION: 99 % | BODY MASS INDEX: 48.65 KG/M2 | HEIGHT: 64 IN | DIASTOLIC BLOOD PRESSURE: 78 MMHG | TEMPERATURE: 98.3 F | WEIGHT: 285 LBS | RESPIRATION RATE: 18 BRPM | SYSTOLIC BLOOD PRESSURE: 142 MMHG | HEART RATE: 92 BPM

## 2023-12-15 DIAGNOSIS — T14.8XXA WOUND INFECTION: Primary | ICD-10-CM

## 2023-12-15 DIAGNOSIS — R60.0 BILATERAL LOWER EXTREMITY EDEMA: ICD-10-CM

## 2023-12-15 DIAGNOSIS — Z87.440 HISTORY OF URINARY TRACT INFECTION: ICD-10-CM

## 2023-12-15 DIAGNOSIS — R41.3 MEMORY CHANGE: ICD-10-CM

## 2023-12-15 DIAGNOSIS — G93.41 ACUTE METABOLIC ENCEPHALOPATHY: ICD-10-CM

## 2023-12-15 DIAGNOSIS — L08.9 WOUND INFECTION: Primary | ICD-10-CM

## 2023-12-15 DIAGNOSIS — R03.0 ELEVATED BLOOD PRESSURE READING WITHOUT DIAGNOSIS OF HYPERTENSION: ICD-10-CM

## 2023-12-15 DIAGNOSIS — G35 MULTIPLE SCLEROSIS: ICD-10-CM

## 2023-12-15 NOTE — PATIENT INSTRUCTIONS
Referral for MS, memory change:   [facility]  [phone] ([phone])  [email or web address]    Wound Management. - Gaurav at [facility]    Continue daily Dial Soap cleansing, apply Bacitracin topical antibiotic daily.     XR leg today.     Urinalysis today     [facility] for blood work.     Chronic Care Management - Yris Donaldson.     Compression stockings  knee hi (20-35mmHg pressure).

## 2023-12-18 ENCOUNTER — HOSPITAL ENCOUNTER (OUTPATIENT)
Dept: GENERAL RADIOLOGY | Facility: HOSPITAL | Age: 63
Discharge: HOME | End: 2023-12-18
Payer: COMMERCIAL

## 2023-12-18 ENCOUNTER — LAB (OUTPATIENT)
Dept: FAMILY MEDICINE CLINIC | Facility: CLINIC | Age: 63
End: 2023-12-18
Payer: COMMERCIAL

## 2023-12-18 ENCOUNTER — REFERRAL TRIAGE (OUTPATIENT)
Dept: CASE MANAGEMENT | Facility: OTHER | Age: 63
End: 2023-12-18
Payer: COMMERCIAL

## 2023-12-18 DIAGNOSIS — Z87.440 HISTORY OF URINARY TRACT INFECTION: Primary | ICD-10-CM

## 2023-12-18 DIAGNOSIS — R31.9 HEMATURIA, UNSPECIFIED TYPE: ICD-10-CM

## 2023-12-18 LAB
BILIRUB BLD-MCNC: NEGATIVE MG/DL
CLARITY, POC: CLEAR
COLOR UR: YELLOW
EXPIRATION DATE: ABNORMAL
GLUCOSE UR STRIP-MCNC: NEGATIVE MG/DL
KETONES UR QL: NEGATIVE
LEUKOCYTE EST, POC: ABNORMAL
Lab: ABNORMAL
NITRITE UR-MCNC: NEGATIVE MG/ML
PH UR: 7 [PH] (ref 5–8)
PROT UR STRIP-MCNC: NEGATIVE MG/DL
RBC # UR STRIP: ABNORMAL /UL
SP GR UR: 1.01 (ref 1–1.03)
UROBILINOGEN UR QL: ABNORMAL

## 2023-12-18 PROCEDURE — 81003 URINALYSIS AUTO W/O SCOPE: CPT

## 2023-12-18 PROCEDURE — 73590 X-RAY EXAM OF LOWER LEG: CPT

## 2023-12-19 LAB — PHOSPHATE SERPL-MCNC: 3.3 MG/DL (ref 3–4.3)

## 2023-12-20 ENCOUNTER — TELEPHONE (OUTPATIENT)
Dept: FAMILY MEDICINE CLINIC | Facility: CLINIC | Age: 63
End: 2023-12-20

## 2023-12-20 ENCOUNTER — TELEPHONE (OUTPATIENT)
Dept: CASE MANAGEMENT | Facility: OTHER | Age: 63
End: 2023-12-20
Payer: COMMERCIAL

## 2023-12-20 NOTE — TELEPHONE ENCOUNTER
Caller: Jake Cornell    Relationship: Emergency Contact    Best call back number: [phone]     CALLED, REGARDING PATIENT'S MRI.  HE IS WONDERING IF INSURANCE AUTHORIZED THIS AND ARE THEY GOING TO COVER THE COSTS?      IS WONDERING IF HE CAN GO SOMEWHERE ELSE FOR THE MRI TO GET THIS DONE SOONER THAN JANUARY 21 2024? AND WILL INSURANCE STILL COVER IT IF HE DID CHANGE LOCATIONS?    HOW DOES HE GO ABOUT DOING THIS? WOULD WE NEED TO SEND THE ORDER TO ANOTHER LOCATION?      PLEASE GIVE  A CALLBACK.

## 2023-12-20 NOTE — TELEPHONE ENCOUNTER
Caller: Jake Cornell    Relationship: Emergency Contact    Best call back number: [phone]     WANTED TO KNOW IF WE EVER FOUND OUT ANYTHING ABOUT THE LEG/ WOUND CULTURE WE TOOK ON PATIENT.    PLEASE ADVISE

## 2023-12-21 ENCOUNTER — TELEPHONE (OUTPATIENT)
Dept: FAMILY MEDICINE CLINIC | Facility: CLINIC | Age: 63
End: 2023-12-21

## 2023-12-21 ENCOUNTER — PATIENT OUTREACH (OUTPATIENT)
Dept: CASE MANAGEMENT | Facility: OTHER | Age: 63
End: 2023-12-21
Payer: COMMERCIAL

## 2023-12-21 DIAGNOSIS — R31.9 URINARY TRACT INFECTION WITH HEMATURIA, SITE UNSPECIFIED: Primary | ICD-10-CM

## 2023-12-21 DIAGNOSIS — N39.0 URINARY TRACT INFECTION WITH HEMATURIA, SITE UNSPECIFIED: Primary | ICD-10-CM

## 2023-12-21 LAB
BACTERIA UR CULT: ABNORMAL
BACTERIA UR CULT: ABNORMAL
OTHER ANTIBIOTIC SUSC ISLT: ABNORMAL

## 2023-12-21 RX ORDER — NITROFURANTOIN 25; 75 MG/1; MG/1
100 CAPSULE ORAL 2 TIMES DAILY
Qty: 14 CAPSULE | Refills: 0 | Status: SHIPPED | OUTPATIENT
Start: 2023-12-21

## 2023-12-21 NOTE — TELEPHONE ENCOUNTER
Caller: Jake Cornell    Relationship: Emergency Contact    Best call back number: [phone]     What is the medical concern/diagnosis: WOUND    What specialty or service is being requested: WOUND CARE CLINIC    What is the provider, practice or medical service name:     What is the office location:     What is the office phone number:     Any additional details:   PATIENT'S  HAS CALLED TO CHECK ON PROGRESS OF GETTING PATIENT IN WOUND CARE CLINIC. PLEASE GIVE  A CALL ABOUT THIS ISSUE.

## 2023-12-21 NOTE — TELEPHONE ENCOUNTER
Spoke with  and informed him we did not have culture results yet. Will call with results once we receive them.

## 2023-12-21 NOTE — OUTREACH NOTE
AMBULATORY CASE MANAGEMENT NOTE    Name and Relationship of Patient/Support Person: Mackenzie Cornell S - Self    Called patient to discuss CCM program and after discussion with  decision was made to enroll in HRCM. Discussed HRCM and role of ACM-RN. Patient's  agreeable to enrollment.     Patient states that she feel like wound is improving. Patient's , Ben states that he can't speak to that due to fact that he is color blind. He did state that there is no pus or anything running out of the wound. Patient states that they are placing ointment on wound and washing it daily with Dial soap. She states that it doesn't hurt any more. Patient states that she has swelling all the time and doesn't feel like it is any worse than normal for her. Ben feels like there is more swelling at this time. He states bilateral lower legs are swollen, red and scaly.     Ben verbalized that patient's mental status has improved since discharge from hospital for UTI. He states that patient is still forgetful at times. He states he would not leave her alone when she first came home but now feel like she is ok to leave alone and to run brief errands in the car.     Ben states that they have appointment with [facility] Wound care on 1/4/24 but he doesn't have time of appointment. He states that Swathi in scheduling is supposed to find out and get back to him.     Ben agreeable to outreach next week from ACM-RN. 24 hour nurse line and ACM-RN phone number given. He denies any further needs at this time.    Adult Patient Profile  Questions/Answers      Flowsheet Row Most Recent Value   Symptoms/Conditions Managed at Home skin, neurological   Barriers to Managing Health other (see comments)   Neurological Symptoms/Conditions multiple sclerosis   Skin Symptoms/Conditions wound   Skin Management Strategies medication therapy          Send Education  Questions/Answers      Flowsheet Row Most Recent Value   Annual Wellness  Visit:  Patient Will Schedule   Other Patient Education/Resources  24/7 [facility] Healthcare Nurse Call Line   24/7 Nurse Call Line Education Method Verbal   Advanced Directives: Not Interested At This Time          SDOH updated and reviewed with the patient during this program:  Financial Resource Strain: Low Risk  (12/21/2023)    Overall Financial Resource Strain (CARDIA)     Difficulty of Paying Living Expenses: Not very hard      --     Food Insecurity: No Food Insecurity (12/21/2023)    Hunger Vital Sign     Worried About Running Out of Food in the Last Year: Never true     Ran Out of Food in the Last Year: Never true      --     Housing Stability: Unknown (12/21/2023)    Housing Stability Vital Sign     Unable to Pay for Housing in the Last Year: No     Unstable Housing in the Last Year: No      --     Transportation Needs: No Transportation Needs (12/21/2023)    PRAPARE - Transportation     Lack of Transportation (Medical): No     Lack of Transportation (Non-Medical): No       Education Documentation  No documentation found.        Monserrat PETERSON  Ambulatory Case Management    12/21/2023, 17:13 EST

## 2023-12-22 LAB
BACTERIA SPEC CULT: NORMAL
MICROORGANISM/AGENT SPEC: NORMAL

## 2023-12-23 LAB
ALBUMIN SERPL-MCNC: 3.9 G/DL (ref 3.9–4.9)
ALBUMIN/GLOB SERPL: 1.2 {RATIO} (ref 1.2–2.2)
ALP SERPL-CCNC: 68 IU/L (ref 44–121)
ALT SERPL-CCNC: 20 IU/L (ref 0–32)
AST SERPL-CCNC: 20 IU/L (ref 0–40)
BASOPHILS # BLD AUTO: 0.1 X10E3/UL (ref 0–0.2)
BASOPHILS NFR BLD AUTO: 1 %
BILIRUB SERPL-MCNC: 0.7 MG/DL (ref 0–1.2)
BUN SERPL-MCNC: 7 MG/DL (ref 8–27)
BUN/CREAT SERPL: 10 (ref 12–28)
CALCIUM SERPL-MCNC: 9 MG/DL (ref 8.7–10.3)
CHLORIDE SERPL-SCNC: 102 MMOL/L (ref 96–106)
CHOLEST SERPL-MCNC: 207 MG/DL (ref 100–199)
CO2 SERPL-SCNC: 23 MMOL/L (ref 20–29)
CREAT SERPL-MCNC: 0.72 MG/DL (ref 0.57–1)
EGFRCR SERPLBLD CKD-EPI 2021: 93 ML/MIN/1.73
EOSINOPHIL # BLD AUTO: 0.2 X10E3/UL (ref 0–0.4)
EOSINOPHIL NFR BLD AUTO: 3 %
ERYTHROCYTE [DISTWIDTH] IN BLOOD BY AUTOMATED COUNT: 15.2 % (ref 11.7–15.4)
FOLATE SERPL-MCNC: 8.4 NG/ML
GLOBULIN SER CALC-MCNC: 3.3 G/DL (ref 1.5–4.5)
GLUCOSE SERPL-MCNC: 123 MG/DL (ref 70–99)
HBA1C MFR BLD: 6.3 % (ref 4.8–5.6)
HCT VFR BLD AUTO: 40 % (ref 34–46.6)
HCV IGG SERPL QL IA: NON REACTIVE
HDLC SERPL-MCNC: 42 MG/DL
HGB BLD-MCNC: 13 G/DL (ref 11.1–15.9)
IMM GRANULOCYTES # BLD AUTO: 0 X10E3/UL (ref 0–0.1)
IMM GRANULOCYTES NFR BLD AUTO: 0 %
LDLC SERPL CALC-MCNC: 131 MG/DL (ref 0–99)
LDLC/HDLC SERPL: 3.1 RATIO (ref 0–3.2)
LYMPHOCYTES # BLD AUTO: 2.3 X10E3/UL (ref 0.7–3.1)
LYMPHOCYTES NFR BLD AUTO: 33 %
MAGNESIUM SERPL-MCNC: 2.3 MG/DL (ref 1.6–2.3)
MCH RBC QN AUTO: 28.2 PG (ref 26.6–33)
MCHC RBC AUTO-ENTMCNC: 32.5 G/DL (ref 31.5–35.7)
MCV RBC AUTO: 87 FL (ref 79–97)
METHYLMALONATE SERPL-SCNC: 676 NMOL/L (ref 0–378)
MONOCYTES # BLD AUTO: 0.6 X10E3/UL (ref 0.1–0.9)
MONOCYTES NFR BLD AUTO: 9 %
NEUTROPHILS # BLD AUTO: 3.6 X10E3/UL (ref 1.4–7)
NEUTROPHILS NFR BLD AUTO: 54 %
PLATELET # BLD AUTO: 335 X10E3/UL (ref 150–450)
POTASSIUM SERPL-SCNC: 4.5 MMOL/L (ref 3.5–5.2)
PROT SERPL-MCNC: 7.2 G/DL (ref 6–8.5)
RBC # BLD AUTO: 4.61 X10E6/UL (ref 3.77–5.28)
SODIUM SERPL-SCNC: 138 MMOL/L (ref 134–144)
T4 FREE SERPL-MCNC: 0.97 NG/DL (ref 0.82–1.77)
TRIGL SERPL-MCNC: 192 MG/DL (ref 0–149)
TSH SERPL DL<=0.005 MIU/L-ACNC: 2.46 UIU/ML (ref 0.45–4.5)
URATE SERPL-MCNC: 4.7 MG/DL (ref 3–7.2)
VIT B12 SERPL-MCNC: 346 PG/ML (ref 232–1245)
VLDLC SERPL CALC-MCNC: 34 MG/DL (ref 5–40)
WBC # BLD AUTO: 6.9 X10E3/UL (ref 3.4–10.8)

## 2023-12-27 ENCOUNTER — TELEPHONE (OUTPATIENT)
Dept: CASE MANAGEMENT | Facility: OTHER | Age: 63
End: 2023-12-27
Payer: COMMERCIAL

## 2023-12-28 ENCOUNTER — PATIENT OUTREACH (OUTPATIENT)
Dept: CASE MANAGEMENT | Facility: OTHER | Age: 63
End: 2023-12-28
Payer: COMMERCIAL

## 2023-12-28 ENCOUNTER — TELEPHONE (OUTPATIENT)
Dept: CASE MANAGEMENT | Facility: OTHER | Age: 63
End: 2023-12-28
Payer: COMMERCIAL

## 2023-12-28 NOTE — OUTREACH NOTE
AMBULATORY CASE MANAGEMENT NOTE    Name and Relationship of Patient/Support Person: Mackenzie Cornell S - Self    Patient Outreach    Patient returned call. Patient's  states that patient has a wound care appointment on 1/4 at 1000. Patient denies any swelling,pain or drainage from wound at this time. Patient's  states that they are continuing to change dressing daily. He states patient is currently on antibiotic for UTI.     Encouraged patient and  to reach out for any needs or concerns. They verbalized understanding and appreciation for outreach.     Education Documentation  No documentation found.        Monserrat PETERSON  Ambulatory Case Management    12/28/2023, 12:50 EST  
- - -

## 2024-01-08 PROBLEM — E71.120 METHYLMALONIC ACIDEMIA: Status: ACTIVE | Noted: 2024-01-08

## 2024-01-08 PROBLEM — R73.03 PREDIABETES: Status: ACTIVE | Noted: 2024-01-08

## 2024-01-08 PROBLEM — E78.2 MIXED HYPERLIPIDEMIA: Status: ACTIVE | Noted: 2024-01-08

## 2024-01-09 ENCOUNTER — PATIENT OUTREACH (OUTPATIENT)
Dept: CASE MANAGEMENT | Facility: OTHER | Age: 64
End: 2024-01-09
Payer: COMMERCIAL

## 2024-01-09 NOTE — OUTREACH NOTE
AMBULATORY CASE MANAGEMENT NOTE    Name and Relationship of Patient/Support Person: Mackenzie Cornell S - Self    Patient Outreach    Called patient to outreach. Patient states things are going well. She states that she has been to [facility] center and they are treating wound. Patient verbalized that wound is without redness, swelling, or drainage at this time. She states that wound is scabbed over and seems to be healing.    Patient denies any other needs at this time. Discussed graduating from program, patient agreeable at this time. Encouraged patient to reach out to ACM-RN or provider for any additional needs. Patient verbalized understanding.    Education Documentation  No documentation found.        Monserrat PETERSON  Ambulatory Case Management    1/9/2024, 14:29 EST

## 2024-01-21 ENCOUNTER — HOSPITAL ENCOUNTER (OUTPATIENT)
Dept: MRI IMAGING | Facility: HOSPITAL | Age: 64
Discharge: HOME | End: 2024-01-21
Payer: COMMERCIAL

## 2024-01-21 LAB
CREAT BLDA-MCNC: 0.7 MG/DL (ref 0.6–1.3)
EGFRCR SERPLBLD CKD-EPI 2021: 96.1 ML/MIN/1.73

## 2024-01-21 PROCEDURE — 82565 ASSAY OF CREATININE: CPT

## 2024-01-21 PROCEDURE — A9579 GAD-BASE MR CONTRAST NOS,1ML: HCPCS

## 2024-01-21 PROCEDURE — 25010000002 GADOTERIDOL PER 1 ML

## 2024-01-21 PROCEDURE — 70553 MRI BRAIN STEM W/O & W/DYE: CPT

## 2024-01-21 RX ADMIN — GADOTERIDOL 20 ML: 279.3 INJECTION, SOLUTION INTRAVENOUS at 09:13

## 2024-02-06 ENCOUNTER — OFFICE VISIT (OUTPATIENT)
Dept: FAMILY MEDICINE CLINIC | Facility: CLINIC | Age: 64
End: 2024-02-06
Payer: COMMERCIAL

## 2024-02-06 VITALS
TEMPERATURE: 97.9 F | WEIGHT: 279 LBS | HEIGHT: 64 IN | OXYGEN SATURATION: 100 % | SYSTOLIC BLOOD PRESSURE: 134 MMHG | BODY MASS INDEX: 47.63 KG/M2 | RESPIRATION RATE: 18 BRPM | DIASTOLIC BLOOD PRESSURE: 84 MMHG | HEART RATE: 77 BPM

## 2024-02-06 DIAGNOSIS — R31.9 HEMATURIA, UNSPECIFIED TYPE: ICD-10-CM

## 2024-02-06 DIAGNOSIS — R41.3 MEMORY CHANGE: ICD-10-CM

## 2024-02-06 DIAGNOSIS — T14.8XXA WOUND INFECTION: ICD-10-CM

## 2024-02-06 DIAGNOSIS — L08.9 WOUND INFECTION: ICD-10-CM

## 2024-02-06 DIAGNOSIS — Z09 FOLLOW-UP EXAM: Primary | ICD-10-CM

## 2024-02-06 DIAGNOSIS — G35 MULTIPLE SCLEROSIS: ICD-10-CM

## 2024-02-06 DIAGNOSIS — E78.2 MIXED HYPERLIPIDEMIA: ICD-10-CM

## 2024-02-06 LAB
BILIRUB BLD-MCNC: NEGATIVE MG/DL
CLARITY, POC: CLEAR
COLOR UR: YELLOW
GLUCOSE UR STRIP-MCNC: NEGATIVE MG/DL
KETONES UR QL: NEGATIVE
LEUKOCYTE EST, POC: ABNORMAL
NITRITE UR-MCNC: NEGATIVE MG/ML
PH UR: 5.5 [PH] (ref 5–8)
PROT UR STRIP-MCNC: NEGATIVE MG/DL
RBC # UR STRIP: ABNORMAL /UL
SP GR UR: 1.03 (ref 1–1.03)
UROBILINOGEN UR QL: NORMAL

## 2024-02-06 PROCEDURE — 81003 URINALYSIS AUTO W/O SCOPE: CPT

## 2024-02-06 PROCEDURE — 99214 OFFICE O/P EST MOD 30 MIN: CPT

## 2024-02-06 RX ORDER — SIMVASTATIN 10 MG
10 TABLET ORAL NIGHTLY
Qty: 30 TABLET | Refills: 12 | Status: SHIPPED | OUTPATIENT
Start: 2024-02-06

## 2024-02-08 DIAGNOSIS — N02.9 PERSISTENT HEMATURIA: Primary | ICD-10-CM

## 2024-02-08 LAB
APPEARANCE UR: ABNORMAL
BACTERIA #/AREA URNS HPF: ABNORMAL /[HPF]
BACTERIA UR CULT: NORMAL
BACTERIA UR CULT: NORMAL
BILIRUB UR QL STRIP: NEGATIVE
CASTS URNS QL MICRO: ABNORMAL /LPF
COLOR UR: YELLOW
EPI CELLS #/AREA URNS HPF: >10 /HPF (ref 0–10)
GLUCOSE UR QL STRIP: NEGATIVE
HGB UR QL STRIP: ABNORMAL
KETONES UR QL STRIP: NEGATIVE
LEUKOCYTE ESTERASE UR QL STRIP: ABNORMAL
MICRO URNS: ABNORMAL
NITRITE UR QL STRIP: NEGATIVE
PH UR STRIP: 5.5 [PH] (ref 5–7.5)
PROT UR QL STRIP: ABNORMAL
RBC #/AREA URNS HPF: ABNORMAL /HPF (ref 0–2)
SP GR UR STRIP: 1.02 (ref 1–1.03)
UROBILINOGEN UR STRIP-MCNC: 0.2 MG/DL (ref 0.2–1)
WBC #/AREA URNS HPF: >30 /HPF (ref 0–5)
YEAST #/AREA URNS HPF: PRESENT /[HPF]

## 2024-02-08 RX ORDER — FLUCONAZOLE 150 MG/1
150 TABLET ORAL ONCE
Qty: 1 TABLET | Refills: 0 | Status: SHIPPED | OUTPATIENT
Start: 2024-02-08 | End: 2024-02-08

## 2024-02-09 RX ORDER — NITROFURANTOIN 25; 75 MG/1; MG/1
100 CAPSULE ORAL 2 TIMES DAILY
Qty: 14 CAPSULE | Refills: 0 | OUTPATIENT
Start: 2024-02-09

## 2024-02-14 ENCOUNTER — TRANSCRIBE ORDERS (OUTPATIENT)
Dept: ADMINISTRATIVE | Facility: HOSPITAL | Age: 64
End: 2024-02-14
Payer: COMMERCIAL

## 2024-02-14 ENCOUNTER — CLINICAL SUPPORT (OUTPATIENT)
Dept: FAMILY MEDICINE CLINIC | Facility: CLINIC | Age: 64
End: 2024-02-14
Payer: COMMERCIAL

## 2024-02-14 DIAGNOSIS — N02.9 PERSISTENT HEMATURIA: Primary | ICD-10-CM

## 2024-02-14 DIAGNOSIS — R26.89 IMBALANCE: ICD-10-CM

## 2024-02-14 DIAGNOSIS — G35 MS (MULTIPLE SCLEROSIS): Primary | ICD-10-CM

## 2024-02-14 LAB
BILIRUB BLD-MCNC: NEGATIVE MG/DL
CLARITY, POC: CLEAR
COLOR UR: YELLOW
GLUCOSE UR STRIP-MCNC: NEGATIVE MG/DL
KETONES UR QL: NEGATIVE
LEUKOCYTE EST, POC: ABNORMAL
NITRITE UR-MCNC: NEGATIVE MG/ML
PH UR: 6.5 [PH] (ref 5–8)
PROT UR STRIP-MCNC: NEGATIVE MG/DL
RBC # UR STRIP: NEGATIVE /UL
SP GR UR: 1.02 (ref 1–1.03)
UROBILINOGEN UR QL: NORMAL

## 2024-02-14 PROCEDURE — 81003 URINALYSIS AUTO W/O SCOPE: CPT

## 2024-02-14 PROCEDURE — FAAUA

## 2024-03-15 ENCOUNTER — LAB (OUTPATIENT)
Dept: FAMILY MEDICINE CLINIC | Facility: CLINIC | Age: 64
End: 2024-03-15
Payer: COMMERCIAL

## 2024-03-15 DIAGNOSIS — R31.9 HEMATURIA, UNSPECIFIED TYPE: Primary | ICD-10-CM

## 2024-03-15 LAB
BILIRUB BLD-MCNC: NEGATIVE MG/DL
CLARITY, POC: ABNORMAL
COLOR UR: YELLOW
EXPIRATION DATE: ABNORMAL
GLUCOSE UR STRIP-MCNC: NEGATIVE MG/DL
KETONES UR QL: NEGATIVE
LEUKOCYTE EST, POC: ABNORMAL
Lab: ABNORMAL
NITRITE UR-MCNC: NEGATIVE MG/ML
PH UR: 6 [PH] (ref 5–8)
PROT UR STRIP-MCNC: NEGATIVE MG/DL
RBC # UR STRIP: ABNORMAL /UL
SP GR UR: 1.02 (ref 1–1.03)
UROBILINOGEN UR QL: ABNORMAL

## 2024-03-17 LAB
BACTERIA UR CULT: NO GROWTH
BACTERIA UR CULT: NORMAL

## 2024-03-18 DIAGNOSIS — N02.9 PERSISTENT HEMATURIA: Primary | ICD-10-CM

## 2024-04-12 ENCOUNTER — HOSPITAL ENCOUNTER (OUTPATIENT)
Dept: MRI IMAGING | Facility: HOSPITAL | Age: 64
Discharge: HOME | End: 2024-04-12
Payer: COMMERCIAL

## 2024-04-12 DIAGNOSIS — G35 MS (MULTIPLE SCLEROSIS): ICD-10-CM

## 2024-04-12 DIAGNOSIS — R26.89 IMBALANCE: ICD-10-CM

## 2024-04-12 LAB
CREAT BLDA-MCNC: 0.6 MG/DL (ref 0.6–1.3)
EGFRCR SERPLBLD CKD-EPI 2021: 99.8 ML/MIN/1.73

## 2024-04-12 PROCEDURE — 82565 ASSAY OF CREATININE: CPT

## 2024-04-12 PROCEDURE — 25010000002 GADOTERIDOL PER 1 ML: Performed by: PSYCHIATRY & NEUROLOGY

## 2024-04-12 PROCEDURE — 72156 MRI NECK SPINE W/O & W/DYE: CPT

## 2024-04-12 PROCEDURE — A9579 GAD-BASE MR CONTRAST NOS,1ML: HCPCS | Performed by: PSYCHIATRY & NEUROLOGY

## 2024-04-12 PROCEDURE — 72157 MRI CHEST SPINE W/O & W/DYE: CPT

## 2024-04-12 RX ADMIN — GADOTERIDOL 20 ML: 279.3 INJECTION, SOLUTION INTRAVENOUS at 15:19

## 2024-08-27 ENCOUNTER — HOSPITAL ENCOUNTER (OUTPATIENT)
Facility: HOSPITAL | Age: 64
Setting detail: OBSERVATION
LOS: 1 days | Discharge: HOME | End: 2024-08-29
Attending: EMERGENCY MEDICINE | Admitting: INTERNAL MEDICINE
Payer: COMMERCIAL

## 2024-08-27 ENCOUNTER — APPOINTMENT (OUTPATIENT)
Dept: GENERAL RADIOLOGY | Facility: HOSPITAL | Age: 64
End: 2024-08-27
Payer: COMMERCIAL

## 2024-08-27 ENCOUNTER — APPOINTMENT (OUTPATIENT)
Dept: CT IMAGING | Facility: HOSPITAL | Age: 64
End: 2024-08-27
Payer: COMMERCIAL

## 2024-08-27 ENCOUNTER — APPOINTMENT (OUTPATIENT)
Dept: MRI IMAGING | Facility: HOSPITAL | Age: 64
End: 2024-08-27
Payer: COMMERCIAL

## 2024-08-27 DIAGNOSIS — N39.0 URINARY TRACT INFECTION WITHOUT HEMATURIA, SITE UNSPECIFIED: ICD-10-CM

## 2024-08-27 DIAGNOSIS — R41.0 CONFUSION: Primary | ICD-10-CM

## 2024-08-27 DIAGNOSIS — I10 HYPERTENSION, UNSPECIFIED TYPE: ICD-10-CM

## 2024-08-27 LAB
ABO GROUP BLD: NORMAL
ALBUMIN SERPL-MCNC: 4.2 G/DL (ref 3.5–5.2)
ALBUMIN/GLOB SERPL: 1.1 G/DL
ALP SERPL-CCNC: 82 U/L (ref 39–117)
ALT SERPL W P-5'-P-CCNC: 22 U/L (ref 1–33)
AMMONIA BLD-SCNC: 20 UMOL/L (ref 11–51)
ANION GAP SERPL CALCULATED.3IONS-SCNC: 12.1 MMOL/L (ref 5–15)
APTT PPP: 21.1 SECONDS (ref 24–31)
APTT PPP: 29.1 SECONDS (ref 24–31)
AST SERPL-CCNC: 21 U/L (ref 1–32)
BACTERIA UR QL AUTO: ABNORMAL /HPF
BASOPHILS # BLD AUTO: 0.06 10*3/MM3 (ref 0–0.2)
BASOPHILS # BLD AUTO: 0.07 10*3/MM3 (ref 0–0.2)
BASOPHILS NFR BLD AUTO: 0.6 % (ref 0–1.5)
BASOPHILS NFR BLD AUTO: 0.7 % (ref 0–1.5)
BILIRUB SERPL-MCNC: 0.6 MG/DL (ref 0–1.2)
BILIRUB UR QL STRIP: NEGATIVE
BLD GP AB SCN SERPL QL: NEGATIVE
BUN SERPL-MCNC: 12 MG/DL (ref 8–23)
BUN/CREAT SERPL: 16.4 (ref 7–25)
CALCIUM SPEC-SCNC: 9.4 MG/DL (ref 8.6–10.5)
CHLORIDE SERPL-SCNC: 103 MMOL/L (ref 98–107)
CHOLEST SERPL-MCNC: 208 MG/DL (ref 0–200)
CLARITY UR: ABNORMAL
CO2 SERPL-SCNC: 24.9 MMOL/L (ref 22–29)
COLOR UR: YELLOW
CREAT SERPL-MCNC: 0.73 MG/DL (ref 0.57–1)
DEPRECATED RDW RBC AUTO: 44.1 FL (ref 37–54)
DEPRECATED RDW RBC AUTO: 44.5 FL (ref 37–54)
DIFFERENTIAL METHOD BLD: ABNORMAL
EGFRCR SERPLBLD CKD-EPI 2021: 90.8 ML/MIN/1.73
EOSINOPHIL # BLD AUTO: 0.1 10*3/MM3 (ref 0–0.4)
EOSINOPHIL # BLD AUTO: 0.14 10*3/MM3 (ref 0–0.4)
EOSINOPHIL NFR BLD AUTO: 0.9 % (ref 0.3–6.2)
EOSINOPHIL NFR BLD AUTO: 1.5 % (ref 0.3–6.2)
ERYTHROCYTE [DISTWIDTH] IN BLOOD BY AUTOMATED COUNT: 13.5 % (ref 12.3–15.4)
ERYTHROCYTE [DISTWIDTH] IN BLOOD BY AUTOMATED COUNT: 13.7 % (ref 12.3–15.4)
GLOBULIN UR ELPH-MCNC: 3.7 GM/DL
GLUCOSE BLDC GLUCOMTR-MCNC: 124 MG/DL (ref 70–105)
GLUCOSE SERPL-MCNC: 112 MG/DL (ref 65–99)
GLUCOSE UR STRIP-MCNC: NEGATIVE MG/DL
HBA1C MFR BLD: 5.78 % (ref 4.8–5.6)
HCT VFR BLD AUTO: 42.1 % (ref 34–46.6)
HCT VFR BLD AUTO: 42.6 % (ref 34–46.6)
HDLC SERPL-MCNC: 46 MG/DL (ref 40–60)
HGB BLD-MCNC: 13.5 G/DL (ref 12–15.9)
HGB BLD-MCNC: 14 G/DL (ref 12–15.9)
HGB UR QL STRIP.AUTO: NEGATIVE
HOLD SPECIMEN: NORMAL
HOLD SPECIMEN: NORMAL
HYALINE CASTS UR QL AUTO: ABNORMAL /LPF
IMM GRANULOCYTES # BLD AUTO: 0.04 10*3/MM3 (ref 0–0.05)
IMM GRANULOCYTES # BLD AUTO: 0.04 10*3/MM3 (ref 0–0.05)
IMM GRANULOCYTES NFR BLD AUTO: 0.4 % (ref 0–0.5)
IMM GRANULOCYTES NFR BLD AUTO: 0.4 % (ref 0–0.5)
INR PPP: 0.98 (ref 0.93–1.1)
INR PPP: 1.03 (ref 0.93–1.1)
KETONES UR QL STRIP: NEGATIVE
LDLC SERPL CALC-MCNC: 136 MG/DL (ref 0–100)
LDLC/HDLC SERPL: 2.9 {RATIO}
LEUKOCYTE ESTERASE UR QL STRIP.AUTO: ABNORMAL
LYMPHOCYTES # BLD AUTO: 1.79 10*3/MM3 (ref 0.7–3.1)
LYMPHOCYTES # BLD AUTO: 1.91 10*3/MM3 (ref 0.7–3.1)
LYMPHOCYTES NFR BLD AUTO: 18 % (ref 19.6–45.3)
LYMPHOCYTES NFR BLD AUTO: 19.2 % (ref 19.6–45.3)
MCH RBC QN AUTO: 28.4 PG (ref 26.6–33)
MCH RBC QN AUTO: 29.2 PG (ref 26.6–33)
MCHC RBC AUTO-ENTMCNC: 32.1 G/DL (ref 31.5–35.7)
MCHC RBC AUTO-ENTMCNC: 32.9 G/DL (ref 31.5–35.7)
MCV RBC AUTO: 88.4 FL (ref 79–97)
MCV RBC AUTO: 88.8 FL (ref 79–97)
MONOCYTES # BLD AUTO: 0.57 10*3/MM3 (ref 0.1–0.9)
MONOCYTES # BLD AUTO: 0.6 10*3/MM3 (ref 0.1–0.9)
MONOCYTES NFR BLD AUTO: 5.4 % (ref 5–12)
MONOCYTES NFR BLD AUTO: 6.5 % (ref 5–12)
NEUTROPHILS NFR BLD AUTO: 6.67 10*3/MM3 (ref 1.7–7)
NEUTROPHILS NFR BLD AUTO: 7.9 10*3/MM3 (ref 1.7–7)
NEUTROPHILS NFR BLD AUTO: 71.8 % (ref 42.7–76)
NEUTROPHILS NFR BLD AUTO: 74.6 % (ref 42.7–76)
NITRITE UR QL STRIP: NEGATIVE
NRBC BLD AUTO-RTO: 0 /100 WBC (ref 0–0.2)
PH UR STRIP.AUTO: 7.5 [PH] (ref 5–8)
PLATELET # BLD AUTO: 306 10*3/MM3 (ref 140–450)
PLATELET # BLD AUTO: 337 10*3/MM3 (ref 140–450)
PMV BLD AUTO: 9.2 FL (ref 6–12)
PMV BLD AUTO: 9.4 FL (ref 6–12)
POTASSIUM SERPL-SCNC: 3.8 MMOL/L (ref 3.5–5.2)
PROT SERPL-MCNC: 7.9 G/DL (ref 6–8.5)
PROT UR QL STRIP: NEGATIVE
PROTHROMBIN TIME: 10.7 SECONDS (ref 9.6–11.7)
PROTHROMBIN TIME: 11.2 SECONDS (ref 9.6–11.7)
RBC # BLD AUTO: 4.76 10*6/MM3 (ref 3.77–5.28)
RBC # BLD AUTO: 4.8 10*6/MM3 (ref 3.77–5.28)
RBC # UR STRIP: ABNORMAL /HPF
REF LAB TEST METHOD: ABNORMAL
RH BLD: POSITIVE
SODIUM SERPL-SCNC: 140 MMOL/L (ref 136–145)
SP GR UR STRIP: 1.07 (ref 1–1.03)
SQUAMOUS #/AREA URNS HPF: ABNORMAL /HPF
T&S EXPIRATION DATE: NORMAL
TRIGL SERPL-MCNC: 144 MG/DL (ref 0–150)
TROPONIN T SERPL HS-MCNC: 35 NG/L
TSH SERPL DL<=0.05 MIU/L-ACNC: 1.74 UIU/ML (ref 0.27–4.2)
TSH SERPL DL<=0.05 MIU/L-ACNC: 1.74 UIU/ML (ref 0.27–4.2)
UROBILINOGEN UR QL STRIP: ABNORMAL
VIT B12 BLD-MCNC: 316 PG/ML (ref 211–946)
VLDLC SERPL-MCNC: 26 MG/DL (ref 5–40)
WBC # BLD AUTO: 9.3 10*3/MM3 (ref 3.4–10.8)
WBC # UR STRIP: ABNORMAL /HPF
WBC NRBC COR # BLD AUTO: 10.59 10*3/MM3 (ref 3.4–10.8)
WBC NRBC COR # BLD AUTO: 9.3 10*3/MM3 (ref 3.4–10.8)
WHOLE BLOOD HOLD COAG: NORMAL
WHOLE BLOOD HOLD SPECIMEN: NORMAL

## 2024-08-27 PROCEDURE — 70551 MRI BRAIN STEM W/O DYE: CPT

## 2024-08-27 PROCEDURE — 86900 BLOOD TYPING SEROLOGIC ABO: CPT

## 2024-08-27 PROCEDURE — 87154 CUL TYP ID BLD PTHGN 6+ TRGT: CPT | Performed by: INTERNAL MEDICINE

## 2024-08-27 PROCEDURE — G0378 HOSPITAL OBSERVATION PER HR: HCPCS

## 2024-08-27 PROCEDURE — 25010000002 ONDANSETRON PER 1 MG: Performed by: EMERGENCY MEDICINE

## 2024-08-27 PROCEDURE — 86850 RBC ANTIBODY SCREEN: CPT | Performed by: EMERGENCY MEDICINE

## 2024-08-27 PROCEDURE — 80061 LIPID PANEL: CPT | Performed by: NURSE PRACTITIONER

## 2024-08-27 PROCEDURE — 80053 COMPREHEN METABOLIC PANEL: CPT | Performed by: EMERGENCY MEDICINE

## 2024-08-27 PROCEDURE — 84443 ASSAY THYROID STIM HORMONE: CPT | Performed by: NURSE PRACTITIONER

## 2024-08-27 PROCEDURE — 87147 CULTURE TYPE IMMUNOLOGIC: CPT | Performed by: INTERNAL MEDICINE

## 2024-08-27 PROCEDURE — 86900 BLOOD TYPING SEROLOGIC ABO: CPT | Performed by: EMERGENCY MEDICINE

## 2024-08-27 PROCEDURE — 70450 CT HEAD/BRAIN W/O DYE: CPT

## 2024-08-27 PROCEDURE — 87086 URINE CULTURE/COLONY COUNT: CPT | Performed by: EMERGENCY MEDICINE

## 2024-08-27 PROCEDURE — 70496 CT ANGIOGRAPHY HEAD: CPT

## 2024-08-27 PROCEDURE — 70498 CT ANGIOGRAPHY NECK: CPT

## 2024-08-27 PROCEDURE — 25010000002 MORPHINE PER 10 MG: Performed by: EMERGENCY MEDICINE

## 2024-08-27 PROCEDURE — 84443 ASSAY THYROID STIM HORMONE: CPT | Performed by: INTERNAL MEDICINE

## 2024-08-27 PROCEDURE — 82140 ASSAY OF AMMONIA: CPT | Performed by: NURSE PRACTITIONER

## 2024-08-27 PROCEDURE — 93005 ELECTROCARDIOGRAM TRACING: CPT | Performed by: EMERGENCY MEDICINE

## 2024-08-27 PROCEDURE — 87040 BLOOD CULTURE FOR BACTERIA: CPT | Performed by: INTERNAL MEDICINE

## 2024-08-27 PROCEDURE — 86901 BLOOD TYPING SEROLOGIC RH(D): CPT

## 2024-08-27 PROCEDURE — 83036 HEMOGLOBIN GLYCOSYLATED A1C: CPT | Performed by: NURSE PRACTITIONER

## 2024-08-27 PROCEDURE — 84484 ASSAY OF TROPONIN QUANT: CPT | Performed by: EMERGENCY MEDICINE

## 2024-08-27 PROCEDURE — 99214 OFFICE O/P EST MOD 30 MIN: CPT | Performed by: NURSE PRACTITIONER

## 2024-08-27 PROCEDURE — 99285 EMERGENCY DEPT VISIT HI MDM: CPT

## 2024-08-27 PROCEDURE — 85610 PROTHROMBIN TIME: CPT | Performed by: EMERGENCY MEDICINE

## 2024-08-27 PROCEDURE — 82948 REAGENT STRIP/BLOOD GLUCOSE: CPT

## 2024-08-27 PROCEDURE — 86901 BLOOD TYPING SEROLOGIC RH(D): CPT | Performed by: EMERGENCY MEDICINE

## 2024-08-27 PROCEDURE — 25010000002 LABETALOL 5 MG/ML SOLUTION: Performed by: NURSE PRACTITIONER

## 2024-08-27 PROCEDURE — 25510000001 IOPAMIDOL PER 1 ML: Performed by: EMERGENCY MEDICINE

## 2024-08-27 PROCEDURE — 25010000002 ENOXAPARIN PER 10 MG: Performed by: INTERNAL MEDICINE

## 2024-08-27 PROCEDURE — 96372 THER/PROPH/DIAG INJ SC/IM: CPT

## 2024-08-27 PROCEDURE — 96375 TX/PRO/DX INJ NEW DRUG ADDON: CPT

## 2024-08-27 PROCEDURE — 97165 OT EVAL LOW COMPLEX 30 MIN: CPT | Performed by: OCCUPATIONAL THERAPIST

## 2024-08-27 PROCEDURE — 82607 VITAMIN B-12: CPT | Performed by: NURSE PRACTITIONER

## 2024-08-27 PROCEDURE — 81001 URINALYSIS AUTO W/SCOPE: CPT | Performed by: EMERGENCY MEDICINE

## 2024-08-27 PROCEDURE — 97161 PT EVAL LOW COMPLEX 20 MIN: CPT

## 2024-08-27 PROCEDURE — 85730 THROMBOPLASTIN TIME PARTIAL: CPT | Performed by: EMERGENCY MEDICINE

## 2024-08-27 PROCEDURE — 25010000002 CEFTRIAXONE PER 250 MG: Performed by: EMERGENCY MEDICINE

## 2024-08-27 PROCEDURE — 71045 X-RAY EXAM CHEST 1 VIEW: CPT

## 2024-08-27 PROCEDURE — 85025 COMPLETE CBC W/AUTO DIFF WBC: CPT | Performed by: EMERGENCY MEDICINE

## 2024-08-27 PROCEDURE — 36415 COLL VENOUS BLD VENIPUNCTURE: CPT

## 2024-08-27 RX ORDER — BISACODYL 10 MG
10 SUPPOSITORY, RECTAL RECTAL DAILY PRN
Status: DISCONTINUED | OUTPATIENT
Start: 2024-08-27 | End: 2024-08-29 | Stop reason: HOSPADM

## 2024-08-27 RX ORDER — MORPHINE SULFATE 2 MG/ML
2 INJECTION, SOLUTION INTRAMUSCULAR; INTRAVENOUS ONCE
Status: COMPLETED | OUTPATIENT
Start: 2024-08-27 | End: 2024-08-27

## 2024-08-27 RX ORDER — SODIUM CHLORIDE 0.9 % (FLUSH) 0.9 %
10 SYRINGE (ML) INJECTION AS NEEDED
Status: DISCONTINUED | OUTPATIENT
Start: 2024-08-27 | End: 2024-08-29 | Stop reason: HOSPADM

## 2024-08-27 RX ORDER — HYDRALAZINE HYDROCHLORIDE 20 MG/ML
10 INJECTION INTRAMUSCULAR; INTRAVENOUS EVERY 6 HOURS PRN
Status: DISCONTINUED | OUTPATIENT
Start: 2024-08-27 | End: 2024-08-29 | Stop reason: HOSPADM

## 2024-08-27 RX ORDER — ONDANSETRON 4 MG/1
4 TABLET, ORALLY DISINTEGRATING ORAL EVERY 6 HOURS PRN
Status: DISCONTINUED | OUTPATIENT
Start: 2024-08-27 | End: 2024-08-29 | Stop reason: HOSPADM

## 2024-08-27 RX ORDER — ACETAMINOPHEN 160 MG/5ML
650 SOLUTION ORAL EVERY 4 HOURS PRN
Status: DISCONTINUED | OUTPATIENT
Start: 2024-08-27 | End: 2024-08-29 | Stop reason: HOSPADM

## 2024-08-27 RX ORDER — ENOXAPARIN SODIUM 100 MG/ML
40 INJECTION SUBCUTANEOUS EVERY 12 HOURS
Status: DISCONTINUED | OUTPATIENT
Start: 2024-08-27 | End: 2024-08-29 | Stop reason: HOSPADM

## 2024-08-27 RX ORDER — ACETAMINOPHEN 325 MG/1
650 TABLET ORAL EVERY 4 HOURS PRN
Status: DISCONTINUED | OUTPATIENT
Start: 2024-08-27 | End: 2024-08-29 | Stop reason: HOSPADM

## 2024-08-27 RX ORDER — POLYETHYLENE GLYCOL 3350 17 G/17G
17 POWDER, FOR SOLUTION ORAL DAILY PRN
Status: DISCONTINUED | OUTPATIENT
Start: 2024-08-27 | End: 2024-08-29 | Stop reason: HOSPADM

## 2024-08-27 RX ORDER — AMOXICILLIN 250 MG
2 CAPSULE ORAL 2 TIMES DAILY PRN
Status: DISCONTINUED | OUTPATIENT
Start: 2024-08-27 | End: 2024-08-29 | Stop reason: HOSPADM

## 2024-08-27 RX ORDER — IOPAMIDOL 755 MG/ML
100 INJECTION, SOLUTION INTRAVASCULAR
Status: COMPLETED | OUTPATIENT
Start: 2024-08-27 | End: 2024-08-27

## 2024-08-27 RX ORDER — BISACODYL 5 MG/1
5 TABLET, DELAYED RELEASE ORAL DAILY PRN
Status: DISCONTINUED | OUTPATIENT
Start: 2024-08-27 | End: 2024-08-29 | Stop reason: HOSPADM

## 2024-08-27 RX ORDER — ATORVASTATIN CALCIUM 10 MG/1
10 TABLET, FILM COATED ORAL DAILY
Status: DISCONTINUED | OUTPATIENT
Start: 2024-08-27 | End: 2024-08-27

## 2024-08-27 RX ORDER — PREDNISOLN SP/MOXIFLOX/BROMFEN 1 %-0.5 %
DROPS OPHTHALMIC (EYE)
COMMUNITY

## 2024-08-27 RX ORDER — ASPIRIN 325 MG
325 TABLET ORAL ONCE
Status: COMPLETED | OUTPATIENT
Start: 2024-08-27 | End: 2024-08-27

## 2024-08-27 RX ORDER — ONDANSETRON 2 MG/ML
4 INJECTION INTRAMUSCULAR; INTRAVENOUS ONCE
Status: COMPLETED | OUTPATIENT
Start: 2024-08-27 | End: 2024-08-27

## 2024-08-27 RX ORDER — ONDANSETRON 2 MG/ML
4 INJECTION INTRAMUSCULAR; INTRAVENOUS EVERY 6 HOURS PRN
Status: DISCONTINUED | OUTPATIENT
Start: 2024-08-27 | End: 2024-08-29 | Stop reason: HOSPADM

## 2024-08-27 RX ORDER — LABETALOL HYDROCHLORIDE 5 MG/ML
10 INJECTION, SOLUTION INTRAVENOUS ONCE
Status: COMPLETED | OUTPATIENT
Start: 2024-08-27 | End: 2024-08-27

## 2024-08-27 RX ORDER — ASPIRIN 81 MG/1
81 TABLET, CHEWABLE ORAL DAILY
Status: DISCONTINUED | OUTPATIENT
Start: 2024-08-28 | End: 2024-08-29 | Stop reason: HOSPADM

## 2024-08-27 RX ORDER — ATORVASTATIN CALCIUM 40 MG/1
80 TABLET, FILM COATED ORAL NIGHTLY
Status: DISCONTINUED | OUTPATIENT
Start: 2024-08-27 | End: 2024-08-29 | Stop reason: HOSPADM

## 2024-08-27 RX ORDER — SODIUM CHLORIDE 0.9 % (FLUSH) 0.9 %
10 SYRINGE (ML) INJECTION EVERY 12 HOURS SCHEDULED
Status: DISCONTINUED | OUTPATIENT
Start: 2024-08-27 | End: 2024-08-29 | Stop reason: HOSPADM

## 2024-08-27 RX ORDER — SODIUM CHLORIDE 9 MG/ML
40 INJECTION, SOLUTION INTRAVENOUS AS NEEDED
Status: DISCONTINUED | OUTPATIENT
Start: 2024-08-27 | End: 2024-08-29 | Stop reason: HOSPADM

## 2024-08-27 RX ORDER — ACETAMINOPHEN 650 MG/1
650 SUPPOSITORY RECTAL EVERY 4 HOURS PRN
Status: DISCONTINUED | OUTPATIENT
Start: 2024-08-27 | End: 2024-08-29 | Stop reason: HOSPADM

## 2024-08-27 RX ADMIN — IOPAMIDOL 100 ML: 755 INJECTION, SOLUTION INTRAVENOUS at 10:43

## 2024-08-27 RX ADMIN — MORPHINE SULFATE 2 MG: 2 INJECTION, SOLUTION INTRAMUSCULAR; INTRAVENOUS at 13:06

## 2024-08-27 RX ADMIN — Medication 10 ML: at 13:07

## 2024-08-27 RX ADMIN — CEFTRIAXONE 2000 MG: 2 INJECTION, POWDER, FOR SOLUTION INTRAMUSCULAR; INTRAVENOUS at 13:05

## 2024-08-27 RX ADMIN — ATORVASTATIN CALCIUM 80 MG: 40 TABLET, FILM COATED ORAL at 20:58

## 2024-08-27 RX ADMIN — Medication 10 ML: at 20:58

## 2024-08-27 RX ADMIN — LABETALOL HYDROCHLORIDE 10 MG: 5 INJECTION, SOLUTION INTRAVENOUS at 10:39

## 2024-08-27 RX ADMIN — ONDANSETRON 4 MG: 2 INJECTION INTRAMUSCULAR; INTRAVENOUS at 13:06

## 2024-08-27 RX ADMIN — ASPIRIN 325 MG ORAL TABLET 325 MG: 325 PILL ORAL at 11:59

## 2024-08-27 RX ADMIN — ENOXAPARIN SODIUM 40 MG: 100 INJECTION SUBCUTANEOUS at 13:07

## 2024-08-27 NOTE — CASE MANAGEMENT/SOCIAL WORK
Discharge Planning Assessment   [facility]     Patient Name: Mackenzie Cornell  MRN: [id number]  Today's Date: 8/27/2024    Admit Date: 8/27/2024    Plan: D/C Plan: Home with spouse   Discharge Needs Assessment       Row Name 08/27/24 1806       Living Environment    People in Home spouse    Name(s) of People in Home Spouse-Jake    Current Living Arrangements home    Potentially Unsafe Housing Conditions none    In the past 12 months has the electric, gas, oil, or water company threatened to shut off services in your home? No    Primary Care Provided by self    Provides Primary Care For no one, unable/limited ability to care for self    Family Caregiver if Needed spouse    Family Caregiver Names Jake    Quality of Family Relationships unable to assess    Able to Return to Prior Arrangements yes    Living Arrangement Comments Home with spouse       Resource/Environmental Concerns    Resource/Environmental Concerns none    Transportation Concerns none       Transportation Needs    In the past 12 months, has lack of transportation kept you from medical appointments or from getting medications? no    In the past 12 months, has lack of transportation kept you from meetings, work, or from getting things needed for daily living? No       Food Insecurity    Within the past 12 months, you worried that your food would run out before you got the money to buy more. Never true    Within the past 12 months, the food you bought just didn't last and you didn't have money to get more. Never true       Transition Planning    Patient/Family Anticipates Transition to home with family    Patient/Family Anticipated Services at Transition none    Transportation Anticipated family or friend will provide       Discharge Needs Assessment    Equipment Currently Used at Home cane, umair    Concerns to be Addressed discharge planning    Anticipated Changes Related to Illness none    Equipment Needed After Discharge none    Provided Post Acute  Provider List? N/A    Provided Post Acute Provider Quality & Resource List? N/A                   Discharge Plan       Row Name 08/27/24 1807       Plan    Plan D/C Plan: Home with spouse    Patient/Family in Agreement with Plan unable to assess    Provided Post Acute Provider List? N/A    Provided Post Acute Provider Quality & Resource List? N/A    Plan Comments Met with patient at bedside, A&O x 4 at time of CM assess. Confirmed PCP and pharmacy. Pt states she lives at home with spouse, Jake, and is IADL's. Denies financial concerns and spouse will provide dc transportation. DC Barriers: Neurology consult                  Continued Care and Services - Admitted Since 8/27/2024    No active coordination exists for this encounter.          Demographic Summary       Row Name 08/27/24 1805       General Information    Admission Type observation    Arrived From home    Referral Source emergency department    Reason for Consult discharge planning    Preferred Language English       Contact Information    Permission Granted to Share Info With                    Functional Status       Row Name 08/27/24 1806       Functional Status    Usual Activity Tolerance good    Current Activity Tolerance moderate       Physical Activity    On average, how many days per week do you engage in moderate to strenuous exercise (like a brisk walk)? 7 days    On average, how many minutes do you engage in exercise at this level? 30 min    Number of minutes of exercise per week 210       Assessment of Health Literacy    How often do you have someone help you read hospital materials? Occasionally    How often do you have problems learning about your medical condition because of difficulty understanding written information? Occasionally    How often do you have a problem understanding what is told to you about your medical condition? Occasionally    How confident are you filling out medical forms by yourself? Quite a bit    Health  Literacy Good       Functional Status, IADL    Medications independent    Meal Preparation independent    Housekeeping independent    Laundry independent    Shopping independent    IADL Comments Pt states she is IADL's       Mental Status    General Appearance WDL WDL       Mental Status Summary    Recent Changes in Mental Status/Cognitive Functioning no changes    Mental Status Comments Pt is A&O x 4 at time of assess             Met with patient in room wearing PPE: mask  Maintained distance greater than six feet and spent less than 15 minutes in the room  Swathi Cochran RN    Phone [phone]  Fax [phone]

## 2024-08-27 NOTE — PLAN OF CARE
Goal Outcome Evaluation:  Plan of Care Reviewed With: patient, spouse           Outcome Evaluation: 65 yo female adm 8/27/24 after episode of confusion and being lost while out driving. Pt was near urgent care, so went there and then was transported to [facility]. CT head (-). Pt having htn emergency in ER w/ BP of 197/73 and found (+) for uti. MRI pending. PMH: MS, memory loss, carotid dz, obesity (BMI 48). At baseline, pt lives w/  in 2 story home w/ bed/bathrooms upstairs. Only has 1/2 bath on main level. Has 2 stairs w/ handrail to enter. Pt is retired and is independent for ambulation for community distances using B hurrycanes. Drives, is independent for ADLs. Today, pt presents as being fully oriented and has normal strength and ROM for BLEs. Able to come to stand and ambulate 240 ft w/ B hurrycanes w/ no significant gait deviations. Reviewed signs/symptoms of cva w/ pt and . No need for skilled PT at this time. Will sign off.

## 2024-08-27 NOTE — CONSULTS
Diabetes Education    Patient Name:  Mackenzie Cornell  YOB: 1960  MRN: [id number]  Admit Date:  8/27/2024    Consult received due to A1c >7%. In reviewing chart, A1c this adm 5.78%. Previous A1c from 12/18/2023 6.3%. Per problem list pt does have hx of prediabetes. Per home med list, pt not taking diabetes medication at home and does not have diabetes meds ordered for this adm. Per consult note, pt with A1c of 9.71%. Educator has sent secure chat to MD to see if this consult was supposed to be for different patient. This pt does not meet criteria for being seen by educator.       Electronically signed by:  Dawna Álvarez RN  08/27/24 17:25 EDT

## 2024-08-27 NOTE — H&P
[facility] Medicine Services  History & Physical    Patient Name: Mackenzie Cornell  : 1960  MRN: [id number]  Primary Care Physician:  Yarely Chand APRN  Date of admission: 2024  Date and Time of Service: 2024    Subjective      Chief Complaint:  episode of confusion while driving    History of Present Illness:    This is 64-year-old morbidly obese female who was driving when developed some confusion and difficulty remembering how to get back home.  She called her  who advised her to go to the ER.  There was also concern about some facial droop.  In ER patient was awake and alert and did not complain of any specific weakness.  Neurology was consulted.  Blood pressure slightly on higher side.  Lab work essentially normal and lipid panel showed hyperlipidemia.  UA showed  pyuria.  Chest x-ray showed cardiomegaly.  CT and neck showed 50-60% right ICA stenosis but no stroke    Review of Systems   Constitutional:  Negative for chills, diaphoresis and fever.   HENT:  Negative for dental problem, ear discharge, hearing loss, nosebleeds, rhinorrhea and sneezing.    Eyes:  Negative for photophobia, redness and itching.   Respiratory:  Negative for cough, chest tightness and stridor.    Cardiovascular:  Negative for leg swelling.   Gastrointestinal:  Negative for abdominal pain, blood in stool and diarrhea.   Endocrine: Negative for heat intolerance.   Genitourinary: Negative for flank pain, frequency and hematuria.   Musculoskeletal:  Negative for back pain, joint swelling, and gait problem.   Skin:  Negative for color change.   Neurological: as above  Psychiatric/Behavioral:  Negative for behavioral problems.      Personal History     Past Medical History:   Diagnosis Date    Multiple sclerosis     Transient global amnesia     about 10 years, and resolved within 1 day    Urinary tract infection        Past Surgical History:   Procedure Laterality Date    COLONOSCOPY      TONSILLECTOMY       WISDOM TOOTH EXTRACTION         Family History: family history includes Asthma in her brother and son; Corneal ulcer in an other family member; Dementia in her paternal grandmother; Heart disease in her mother; Hypertension in her mother; Leukemia in her maternal grandfather; No Known Problems in her daughter; Other in her father and son; Parkinsonism in an other family member. Otherwise pertinent FHx was reviewed and not pertinent to current issue.    Social History:  reports that she has never smoked. She has never used smokeless tobacco. She reports current alcohol use. She reports that she does not use drugs.    Home Medications:  Prior to Admission Medications       Prescriptions Last Dose Informant Patient Reported? Taking?    Multiple Vitamins-Minerals (MULTI FOR HER) pack   Yes No    1 tablet Daily.    nitrofurantoin, macrocrystal-monohydrate, (MACROBID) 100 MG capsule   No No    Take 1 capsule by mouth 2 (Two) Times a Day.    simvastatin (ZOCOR) 10 MG tablet   No No    Take 1 tablet by mouth Every Night.              Allergies:  Allergies   Allergen Reactions    Sulfa Antibiotics Hives       Objective      Vitals:   Temp:  [98.1 °F (36.7 °C)] 98.1 °F (36.7 °C)  Heart Rate:  [68-91] 70  Resp:  [18-22] 18  BP: (146-195)/(53-73) 146/61  Body mass index is 47.55 kg/m².  Physical Exam:    Constitutional: Patient appears well-developed and well-nourished and in no acute distress   HEENT:   Head: Normocephalic and atraumatic.   Eyes:  Pupils are equal, round, and reactive to light. EOM are intact. Sclera are anicteric and non-injected.  Mouth and Throat: Patient has moist mucous membranes.      Neck: Neck supple.  No thyromegaly present. No lymphadenopathy present. No  masses.     Cardiovascular: Inspection: No JVD present. Palpation:bilaterally. No leg edema. Auscultation: Regular rate, regular rhythm, S1 normal and S2 normal. reveals no gallop and no friction rub. No Carotid bruit  bilaterally.    Pulmonary/Chest: Inspection: No distress, no use of accessory muscles. Lungs are clear to auscultation bilaterally. No respiratory distress. No wheezes. No rhonchi. No rales.     Abdomen /Gastrointestinal: Inspection: no distension. Palpation: no masses, no organomegaly. Soft. There is no tenderness. Bowel sounds are normal.   Extremities no cyanosis clubbing or edema    Neurological: Patient is alert and oriented to person, place, and time. Cranial nerves II-XII are grossly intact with no focal deficits. Sensori-motor exam is normal. No cerebellar signs.    Skin: Skin is warm. No rash noted. Nails show no clubbing.  No cyanosis or erythema. No bruising.      Diagnostic Data:  Results from last 7 days   Lab Units 08/27/24  1137 08/27/24  1030   WBC 10*3/mm3 10.59 9.30  9.30   HEMOGLOBIN g/dL 13.5 14.0   HEMATOCRIT % 42.1 42.6   PLATELETS 10*3/mm3 337 306   GLUCOSE mg/dL  --  112*   CREATININE mg/dL  --  0.73   BUN mg/dL  --  12   SODIUM mmol/L  --  140   POTASSIUM mmol/L  --  3.8   AST (SGOT) U/L  --  21   ALT (SGPT) U/L  --  22   ALK PHOS U/L  --  82   BILIRUBIN mg/dL  --  0.6   ANION GAP mmol/L  --  12.1       XR Chest 1 View    Result Date: 8/27/2024  Impression: Mild cardiomegaly. No acute cardiopulmonary findings. Electronically Signed: Kalani Aguillon MD  8/27/2024 11:12 AM EDT  Workstation ID: [id number]    CT Angiogram Head w AI Analysis of LVO    Result Date: 8/27/2024  Impression: Multifocal cervical and intracranial atherosclerotic changes are present, without evidence of focal severe stenosis, large vessel occlusion or aneurysm. 50 to 60% stenosis is present at the right ICA origin. Electronically Signed: Mega Ortega MD  8/27/2024 10:56 AM EDT  Workstation ID: [id number]    CT Angiogram Neck    Result Date: 8/27/2024  Impression: Multifocal cervical and intracranial atherosclerotic changes are present, without evidence of focal severe stenosis, large vessel occlusion or aneurysm. 50 to 60%  stenosis is present at the right ICA origin. Electronically Signed: Meag Ortega MD  8/27/2024 10:56 AM EDT  Workstation ID: [id number]    CT Head Without Contrast Stroke Protocol    Result Date: 8/27/2024  No acute intracranial abnormality. Atrophy and chronic white matter changes compatible with sequela of small vessel ischemic disease and remote ischemic insults. No definite acute abnormality by CT noted.. Electronically Signed: Modesto Rahman MD  8/27/2024 10:34 AM EDT  Workstation ID: [id number]     Results for orders placed or performed during the hospital encounter of 11/15/23   Blood Culture - Blood, Arm, Right    Specimen: Arm, Right; Blood   Result Value Ref Range    Blood Culture No growth at 5 days        I have personally reviewed the patient's new results.       Assessment & Plan        Active and Resolved Problems    Episode of confusion while driving   ? HTN  Prior episodes of confusion seems a UTI related  Morbid obesity  Hyperlipidemia  Acute cystitis  Right-sided carotid artery disease    Suggestion     At this time will admit this patient in hospital  I will get an MRI of brain  Neurology has been consulted   Start aspirin  Continue statin  Monitor blood pressure  Antibiotic in the form of Rocephin for acute cystitis      VTE Prophylaxis:  Pharmacologic VTE prophylaxis orders are signed & held.          The patient desires to be as follows:    CODE STATUS:           Admission Status:      Expected Length of Stay:     PDMP and Medication Dispenses via Sidebar reviewed and consistent with patient reported medications.        Signature:     This document has been electronically signed by Adolfo Cabrera MD on August 27, 2024 12:30 EDT   [facility] Hospitalist Team a the the

## 2024-08-27 NOTE — CONSULTS
Pt in room with staff and spouse at bedside. Spouse asked for healing prayer and answers. After prayer, there were no other needs.

## 2024-08-27 NOTE — THERAPY EVALUATION
Patient Name: Mackenzie Cornell  : 1960    MRN: [id number]                              Today's Date: 2024       Admit Date: 2024    Visit Dx:     ICD-10-CM ICD-9-CM   1. Confusion  R41.0 298.9   2. Urinary tract infection without hematuria, site unspecified  N39.0 599.0     Patient Active Problem List   Diagnosis    Multiple sclerosis    Body mass index (BMI) of 50-59.9 in adult    Transient global amnesia    Acute metabolic encephalopathy    Leg wound, left, initial encounter    History of urinary tract infection    Memory change    Paternal family history of dementia    Mixed hyperlipidemia    Prediabetes    Methylmalonic acidemia    Confusion     Past Medical History:   Diagnosis Date    Multiple sclerosis     Transient global amnesia     about 10 years, and resolved within 1 day    Urinary tract infection      Past Surgical History:   Procedure Laterality Date    COLONOSCOPY      TONSILLECTOMY      WISDOM TOOTH EXTRACTION        General Information       Row Name 24 1602          Physical Therapy Time and Intention    Document Type evaluation  -CM     Mode of Treatment physical therapy  -CM       Row Name 24 1602          General Information    Patient Profile Reviewed yes  -CM     Prior Level of Function independent:;community mobility;gait;ADL's;driving  retired; uses 2 hurrycanes to amb  -CM     Existing Precautions/Restrictions no known precautions/restrictions  -CM     Barriers to Rehab medically complex  -CM       Row Name 24 1602          Home Main Entrance    Number of Stairs, Main Entrance two  -CM     Stair Railings, Main Entrance railings safe and in good condition  -CM       Row Name 24 1602          Stairs Within Home, Primary    Stairs, Within Home, Primary one flight of stairs to upstairs bed/bath; does not have bedroom on first floor; has only 1/2 bath on first floor  -CM     Number of Stairs, Within Home, Primary other (see comments)  -CM       Row Name  08/27/24 1602          Cognition    Orientation Status (Cognition) oriented x 4  -CM               User Key  (r) = Recorded By, (t) = Taken By, (c) = Cosigned By      Initials Name Provider Type    Roxane Flores, PT Physical Therapist                   Mobility       Row Name 08/27/24 1604          Bed Mobility    Bed Mobility bed mobility (all) activities  -CM     All Activities, [place] (Bed Mobility) not tested  -CM     Comment, (Bed Mobility) sitting in chair when PT arrived  -CM       Row Name 08/27/24 1604          Sit-Stand Transfer    Sit-Stand [place] (Transfers) independent  -CM       Row Name 08/27/24 1604          Gait/Stairs (Locomotion)    [place] Level (Gait) modified independence  -CM     Assistive Device (Gait) other (see comments)  B hurrycanes; wearing flip/flop sandals, declined to change to tread socks; gait belt  -CM     Patient was able to Ambulate yes  -CM     Distance in Feet (Gait) 240  reciprocal gait pattern; wide base of support; no loss of balance or other gait deviations  -CM               User Key  (r) = Recorded By, (t) = Taken By, (c) = Cosigned By      Initials Name Provider Type    Roxane Flores, PT Physical Therapist                   Obj/Interventions       Row Name 08/27/24 1605          Range of Motion Comprehensive    General Range of Motion no range of motion deficits identified  -CM       Row Name 08/27/24 1605          Strength Comprehensive (MMT)    General Manual Muscle Testing (MMT) Assessment no strength deficits identified  -CM       Row Name 08/27/24 1605          Balance    Balance Assessment sitting static balance;sitting dynamic balance;standing static balance;standing dynamic balance  -CM     Static Sitting Balance independent  -CM     Dynamic Sitting Balance independent  -CM     Position, Sitting Balance unsupported;sitting in chair  -CM     Static Standing Balance independent  -CM     Dynamic Standing Balance modified independence   -CM     Position/Device Used, Standing Balance supported;other (see comments)  B hurrycanes  -CM       Row Name 08/27/24 1605          Sensory Assessment (Somatosensory)    Sensory Assessment (Somatosensory) sensation intact  -CM               User Key  (r) = Recorded By, (t) = Taken By, (c) = Cosigned By      Initials Name Provider Type    Roxane Flores, PT Physical Therapist                   Goals/Plan    No documentation.                  Clinical Impression       Row Name 08/27/24 1606          Pain    Pretreatment Pain Rating 0/10 - no pain  -CM     Posttreatment Pain Rating 0/10 - no pain  -CM     Pre/Posttreatment Pain Comment had morphine a short time ago; was having RLE cramping prior to receiving meds.  -CM     Pain Intervention(s) Medication (See MAR);Repositioned;Ambulation/increased activity;Emotional support  -       Row Name 08/27/24 1606          Plan of Care Review    Plan of Care Reviewed With patient;spouse  -CM     Outcome Evaluation 63 yo female adm 8/27/24 after episode of confusion and being lost while out driving. Pt was near urgent care, so went there and then was transported to [facility]. CT head (-). Pt having htn emergency in ER w/ BP of 197/73 and found (+) for uti. MRI pending. PMH: MS, memory loss, carotid dz, obesity (BMI 48). At baseline, pt lives w/  in 2 story home w/ bed/bathrooms upstairs. Only has 1/2 bath on main level. Has 2 stairs w/ handrail to enter. Pt is retired and is independent for ambulation for community distances using B hurrycanes. Drives, is independent for ADLs. Today, pt presents as being fully oriented and has normal strength and ROM for BLEs. Able to come to stand and ambulate 240 ft w/ B hurrycanes w/ no significant gait deviations. Reviewed signs/symptoms of cva w/ pt and . No need for skilled PT at this time. Will sign off.  -CM       Row Name 08/27/24 1620          Therapy Assessment/Plan (PT)    Criteria for Skilled Interventions Met  (PT) no;no problems identified which require skilled intervention  -CM     Therapy Frequency (PT) evaluation only  -CM       Row Name 08/27/24 1620          Vital Signs    O2 Delivery Pre Treatment room air  -CM     O2 Delivery Intra Treatment room air  -CM     O2 Delivery Post Treatment room air  -CM     Recovery Time pt not on any monitors  -CM       Row Name 08/27/24 1620          Positioning and Restraints    Pre-Treatment Position sitting in chair/recliner  -CM     Post Treatment Position chair  -CM     In Chair notified nsg;sitting;call light within reach;encouraged to call for assist;with family/caregiver  -CM               User Key  (r) = Recorded By, (t) = Taken By, (c) = Cosigned By      Initials Name Provider Type    Roxane Flores, PT Physical Therapist                   Outcome Measures       Row Name 08/27/24 1620          How much help from another person do you currently need...    Turning from your back to your side while in flat bed without using bedrails? 4  -CM     Moving from lying on back to sitting on the side of a flat bed without bedrails? 4  -CM     Moving to and from a bed to a chair (including a wheelchair)? 4  -CM     Standing up from a chair using your arms (e.g., wheelchair, bedside chair)? 4  -CM     Climbing 3-5 steps with a railing? 4  -CM     To walk in hospital room? 4  -CM     AM-PAC 6 Clicks Score (PT) 24  -CM     Highest Level of Mobility Goal 8 --> Walked 250 feet or more  -CM       Row Name 08/27/24 1620          Modified [place] Scale    Pre-Stroke Modified [place] Scale 0 - No Symptoms at all.  -CM     Modified Altagracia Scale 0 - No Symptoms at all.  -CM       Row Name 08/27/24 1620          Functional Assessment    Outcome Measure Options AM-PAC 6 Clicks Basic Mobility (PT);Modified [place]  -CM               User Key  (r) = Recorded By, (t) = Taken By, (c) = Cosigned By      Initials Name Provider Type    Roxane Flores, PT Physical Therapist                                  Physical Therapy Education       Title: PT OT SLP Therapies (In Progress)       Topic: Physical Therapy (Done)       Point: Mobility training (Done)       Learning Progress Summary             Patient Acceptance, E,TB, VU,DU by CM at 8/27/2024 1621   Significant Other Acceptance, E,TB, VU,DU by CM at 8/27/2024 1621                         Point: Body mechanics (Done)       Learning Progress Summary             Patient Acceptance, E,TB, VU,DU by CM at 8/27/2024 1621   Significant Other Acceptance, E,TB, VU,DU by CM at 8/27/2024 1621                         Point: Precautions (Done)       Learning Progress Summary             Patient Acceptance, E,TB, VU,DU by CM at 8/27/2024 1621   Significant Other Acceptance, E,TB, VU,DU by CM at 8/27/2024 1621                                         User Key       Initials Effective Dates Name Provider Type Discipline    CM 06/16/21 -  Roxane Herndon, PT Physical Therapist PT                  PT Recommendation and Plan     Plan of Care Reviewed With: patient, spouse  Outcome Evaluation: 65 yo female adm 8/27/24 after episode of confusion and being lost while out driving. Pt was near urgent care, so went there and then was transported to [facility]. CT head (-). Pt having htn emergency in ER w/ BP of 197/73 and found (+) for uti. MRI pending. PMH: MS, memory loss, carotid dz, obesity (BMI 48). At baseline, pt lives w/  in 2 story home w/ bed/bathrooms upstairs. Only has 1/2 bath on main level. Has 2 stairs w/ handrail to enter. Pt is retired and is independent for ambulation for community distances using B hurrycanes. Drives, is independent for ADLs. Today, pt presents as being fully oriented and has normal strength and ROM for BLEs. Able to come to stand and ambulate 240 ft w/ B hurrycanes w/ no significant gait deviations. Reviewed signs/symptoms of cva w/ pt and . No need for skilled PT at this time. Will sign off.     Time Calculation:   PT Evaluation  Complexity  History, PT Evaluation Complexity: 1-2 personal factors and/or comorbidities  Examination of Body Systems (PT Eval Complexity): total of 4 or more elements  Clinical Presentation (PT Evaluation Complexity): stable  Clinical Decision Making (PT Evaluation Complexity): low complexity  Overall Complexity (PT Evaluation Complexity): low complexity     PT Charges       Row Name 08/27/24 1621             Time Calculation    Start Time 1540  -CM      Stop Time 1556  -CM      Time Calculation (min) 16 min  -CM      PT Received On 08/27/24  -CM         Time Calculation- PT    Total Timed Code Minutes- PT 0 minute(s)  -CM                User Key  (r) = Recorded By, (t) = Taken By, (c) = Cosigned By      Initials Name Provider Type    Roxane Flores, PT Physical Therapist                  Therapy Charges for Today       Code Description Service Date Service Provider Modifiers Qty    [id number]  PT EVAL LOW COMPLEXITY 3 8/27/2024 Roxane Herndon, PT GP 1            PT G-Codes  Outcome Measure Options: AM-PAC 6 Clicks Basic Mobility (PT), Modified [place]  AM-PAC 6 Clicks Score (PT): 24  Modified [place] Scale: 0 - No Symptoms at all.  PT Discharge Summary  Anticipated Discharge Disposition (PT): home with assist    Roxane Herndon, PT  8/27/2024

## 2024-08-27 NOTE — THERAPY EVALUATION
Patient Name: Mackenzie Cornell  : 1960    MRN: [id number]                              Today's Date: 2024       Admit Date: 2024    Visit Dx:     ICD-10-CM ICD-9-CM   1. Confusion  R41.0 298.9   2. Urinary tract infection without hematuria, site unspecified  N39.0 599.0     Patient Active Problem List   Diagnosis    Multiple sclerosis    Body mass index (BMI) of 50-59.9 in adult    Transient global amnesia    Acute metabolic encephalopathy    Leg wound, left, initial encounter    History of urinary tract infection    Memory change    Paternal family history of dementia    Mixed hyperlipidemia    Prediabetes    Methylmalonic acidemia    Confusion     Past Medical History:   Diagnosis Date    Multiple sclerosis     Transient global amnesia     about 10 years, and resolved within 1 day    Urinary tract infection      Past Surgical History:   Procedure Laterality Date    COLONOSCOPY      TONSILLECTOMY      WISDOM TOOTH EXTRACTION        General Information       Row Name 24 1631          OT Time and Intention    Document Type evaluation  -DT     Mode of Treatment occupational therapy  -DT       Row Name 24 1631          General Information    Patient Profile Reviewed yes  -DT     Prior Level of Function independent:;all household mobility;community mobility;driving;ADL's;using stairs;home management  Uses 2 hurry canes when ambulating.  -DT     Existing Precautions/Restrictions no known precautions/restrictions  -DT     Barriers to Rehab medically complex  -DT       Row Name 24 1631          Living Environment    People in Home spouse  -DT       Row Name 24 1631          Home Main Entrance    Number of Stairs, Main Entrance two  -DT     Stair Railings, Main Entrance railings safe and in good condition  -DT       Row Name 24 1631          Stairs Within Home, Primary    Stairs, Within Home, Primary Bedroom & bath on 2nd floor. Has 1/2 bath on main level.  -DT     Number of  Stairs, Within Home, Primary twelve  -DT       Row Name 08/27/24 1631          Cognition    Orientation Status (Cognition) oriented x 4  -DT               User Key  (r) = Recorded By, (t) = Taken By, (c) = Cosigned By      Initials Name Provider Type    DT Mirella Richardson, OT Occupational Therapist                     Mobility/ADL's       Row Name 08/27/24 1632          Bed Mobility    Bed Mobility bed mobility (all) activities  -DT     All Activities, [place] (Bed Mobility) not tested  -DT     Comment, (Bed Mobility) Pt sititng up in recliner  -DT       Row Name 08/27/24 1632          Sit-Stand Transfer    Sit-Stand [place] (Transfers) independent  -DT       Row Name 08/27/24 1632          Functional Mobility    Functional Mobility- Comment defer to PT for ambulation specifics  -DT     Patient was able to Ambulate yes  -DT       Row Name 08/27/24 1632          Activities of Daily Living    BADL Assessment/Intervention upper body dressing  -DT       Row Name 08/27/24 1632          Upper Body Dressing Assessment/Training    [place] Level (Upper Body Dressing) upper body dressing skills;independent  -DT     Position (Upper Body Dressing) unsupported sitting  -DT               User Key  (r) = Recorded By, (t) = Taken By, (c) = Cosigned By      Initials Name Provider Type    DT Mirella Richardson, OT Occupational Therapist                   Obj/Interventions       Row Name 08/27/24 1636          Sensory Assessment (Somatosensory)    Sensory Assessment (Somatosensory) sensation intact  -DT       Row Name 08/27/24 1636          Vision Assessment/Intervention    Visual Impairment/Limitations corrective lenses full-time;WFL  -DT       Row Name 08/27/24 1636          Range of Motion Comprehensive    General Range of Motion no range of motion deficits identified  -DT       Row Name 08/27/24 1636          Strength Comprehensive (MMT)    General Manual Muscle Testing (MMT) Assessment no strength  deficits identified  -DT       Row Name 08/27/24 1636          Balance    Balance Assessment sitting static balance;sitting dynamic balance;standing static balance;standing dynamic balance  -DT     Static Sitting Balance independent  -DT     Dynamic Sitting Balance independent  -DT     Static Standing Balance independent  -DT     Dynamic Standing Balance modified independence  -DT     Position/Device Used, Standing Balance supported  Pt used 2 hurry canes at times.  -DT               User Key  (r) = Recorded By, (t) = Taken By, (c) = Cosigned By      Initials Name Provider Type    Mirella Irwin, OT Occupational Therapist                   Goals/Plan    No documentation.                  Clinical Impression       Row Name 08/27/24 1636          Pain Assessment    Pretreatment Pain Rating 0/10 - no pain  -DT     Posttreatment Pain Rating 0/10 - no pain  -DT       Row Name 08/27/24 1636          Plan of Care Review    Plan of Care Reviewed With patient;spouse  -DT     Outcome Evaluation Pt is a 64 y.o. female presenting to ED with reports of confusion. Pt was out driving & couldn't recall how to get to where she was going. She called spouse & he walked her through how to get to Urgent Care to be seen. Pt transferred to [facility] for CVA work-up. CT (-) & MRI is currently pending. PMH: MS, memory loss, carotid dx, obesity. At baseline, pt lives with spouse in 2 story home with 2 JULIANNE. Her bedroom & full bath is on 2nd floor. Pt is ind with BADLs & IADLs & uses 2 hurry canes when ambulating. Upon eval, pt is A&O X4. She was independent with topographical orientation while ambulating in ED. She demo ind with UB dressing task & simulated LB dressing/toileting tasks. She has no detected visual/perceptual, sensation, strength, ROM or balance deficits affecting her ADL or mobility status below her baseline status. Pt & spouse educated on stroke s/s & risk factors. Pt able to recall information upon review. No further OT  tx recommended at this time. Recommend home with spouse once medically cleared. OT will sign off at this time.  -DT       Row Name 08/27/24 1636          Therapy Assessment/Plan (OT)    Criteria for Skilled Therapeutic Interventions Met (OT) no;no problems identified which require skilled intervention  -DT     Therapy Frequency (OT) evaluation only  -DT       Row Name 08/27/24 1636          Therapy Plan Review/Discharge Plan (OT)    Anticipated Discharge Disposition (OT) home  -DT       Row Name 08/27/24 1636          Positioning and Restraints    Pre-Treatment Position sitting in chair/recliner  -DT     Post Treatment Position chair  -DT     In Chair notified nsg;sitting;call light within reach;encouraged to call for assist;with family/caregiver  -DT               User Key  (r) = Recorded By, (t) = Taken By, (c) = Cosigned By      Initials Name Provider Type    DT Mirella Richardson, OT Occupational Therapist                   Outcome Measures       Row Name 08/27/24 1620          How much help from another person do you currently need...    Turning from your back to your side while in flat bed without using bedrails? 4  -CM     Moving from lying on back to sitting on the side of a flat bed without bedrails? 4  -CM     Moving to and from a bed to a chair (including a wheelchair)? 4  -CM     Standing up from a chair using your arms (e.g., wheelchair, bedside chair)? 4  -CM     Climbing 3-5 steps with a railing? 4  -CM     To walk in hospital room? 4  -CM     AM-PAC 6 Clicks Score (PT) 24  -CM     Highest Level of Mobility Goal 8 --> Walked 250 feet or more  -CM       Row Name 08/27/24 1651 08/27/24 1620       Modified Altagracia Scale    Pre-Stroke Modified Altagracia Scale 0 - No Symptoms at all.  -DT 0 - No Symptoms at all.  -CM    Modified [place] Scale 0 - No Symptoms at all.  -DT 0 - No Symptoms at all.  -CM      Row Name 08/27/24 1651 08/27/24 1620       Functional Assessment    Outcome Measure Options Modified  [place]  -DT AM-PAC 6 Clicks Basic Mobility (PT);Modified [place]  -CM              User Key  (r) = Recorded By, (t) = Taken By, (c) = Cosigned By      Initials Name Provider Type    Roxane Flores, PT Physical Therapist    Mirella Irwin, OT Occupational Therapist                    Occupational Therapy Education       Title: PT OT SLP Therapies (Done)       Topic: Occupational Therapy (Done)       Point: ADL training (Done)       Description:   Instruct learner(s) on proper safety adaptation and remediation techniques during self care or transfers.   Instruct in proper use of assistive devices.                  Learning Progress Summary             Patient Acceptance, E,TB, VU by DT at 8/27/2024 1651    Comment: Role of OT, safety prec, stroke education   Family Acceptance, E,TB, VU by DT at 8/27/2024 1651    Comment: Role of OT, safety prec, stroke education                         Point: Home exercise program (Done)       Description:   Instruct learner(s) on appropriate technique for monitoring, assisting and/or progressing therapeutic exercises/activities.                  Learning Progress Summary             Patient Acceptance, E,TB, VU by DT at 8/27/2024 1651    Comment: Role of OT, safety prec, stroke education   Family Acceptance, E,TB, VU by DT at 8/27/2024 1651    Comment: Role of OT, safety prec, stroke education                         Point: Precautions (Done)       Description:   Instruct learner(s) on prescribed precautions during self-care and functional transfers.                  Learning Progress Summary             Patient Acceptance, E,TB, VU by DT at 8/27/2024 1651    Comment: Role of OT, safety prec, stroke education   Family Acceptance, E,TB, VU by DT at 8/27/2024 1651    Comment: Role of OT, safety prec, stroke education                         Point: Body mechanics (Done)       Description:   Instruct learner(s) on proper positioning and spine alignment during self-care,  functional mobility activities and/or exercises.                  Learning Progress Summary             Patient Acceptance, E,TB, VU by DT at 8/27/2024 1651    Comment: Role of OT, safety prec, stroke education   Family Acceptance, E,TB, VU by DT at 8/27/2024 1651    Comment: Role of OT, safety prec, stroke education                                         User Key       Initials Effective Dates Name Provider Type Discipline    DT 07/11/23 -  Mirella Richardson, OT Occupational Therapist OT                  OT Recommendation and Plan  Therapy Frequency (OT): evaluation only  Plan of Care Review  Plan of Care Reviewed With: patient, spouse  Outcome Evaluation: Pt is a 64 y.o. female presenting to ED with reports of confusion. Pt was out driving & couldn't recall how to get to where she was going. She called spouse & he walked her through how to get to Urgent Care to be seen. Pt transferred to [facility] for CVA work-up. CT (-) & MRI is currently pending. PMH: MS, memory loss, carotid dx, obesity. At baseline, pt lives with spouse in 2 story home with 2 JULIANNE. Her bedroom & full bath is on 2nd floor. Pt is ind with BADLs & IADLs & uses 2 hurry canes when ambulating. Upon eval, pt is A&O X4. She was independent with topographical orientation while ambulating in ED. She demo ind with UB dressing task & simulated LB dressing/toileting tasks. She has no detected visual/perceptual, sensation, strength, ROM or balance deficits affecting her ADL or mobility status below her baseline status. Pt & spouse educated on stroke s/s & risk factors. Pt able to recall information upon review. No further OT tx recommended at this time. Recommend home with spouse once medically cleared. OT will sign off at this time.     Time Calculation:         Time Calculation- OT       Row Name 08/27/24 1651             Time Calculation- OT    OT Start Time 1539  -DT      OT Stop Time 1559  -DT      OT Time Calculation (min) 20 min  -DT      OT Received  On 08/27/24  -DT                User Key  (r) = Recorded By, (t) = Taken By, (c) = Cosigned By      Initials Name Provider Type    Mirella Irwin, OT Occupational Therapist                           Mirella Richardson, [place]  8/27/2024

## 2024-08-27 NOTE — Clinical Note
Level of Care: Telemetry [5]   Diagnosis: Confusion [193061]   Certification: I Certify That Inpatient Hospital Services Are Medically Necessary For Greater Than 2 Midnights

## 2024-08-27 NOTE — ED PROVIDER NOTES
Subjective   History of Present Illness  Chief complaint: Confusion    64-year-old female presents after an episode of confusion.  Patient was apparently driving and then realize she could not figure out how to get home.  She states her mind was just not working correctly.  EMS also noticed a right-sided facial droop.  Patient denies any other numbness, weakness, tingling.  She states she is starting to feel better now.  She denies headache.  She denies any recent illness.    History provided by:  Patient      Review of Systems   Constitutional:  Negative for fever.   HENT:  Negative for congestion.    Respiratory:  Negative for cough and shortness of breath.    Cardiovascular:  Negative for chest pain.   Gastrointestinal:  Negative for abdominal pain and vomiting.   Genitourinary:  Negative for dysuria.   Musculoskeletal:  Negative for back pain.   Neurological:  Positive for weakness. Negative for numbness and headaches.   Psychiatric/Behavioral:  Positive for confusion.        Past Medical History:   Diagnosis Date    Multiple sclerosis     Transient global amnesia     about 10 years, and resolved within 1 day    Urinary tract infection        Allergies   Allergen Reactions    Sulfa Antibiotics Hives       Past Surgical History:   Procedure Laterality Date    COLONOSCOPY      TONSILLECTOMY      WISDOM TOOTH EXTRACTION         Family History   Problem Relation Age of Onset    Hypertension Mother     Heart disease Mother         A-fib    Other Father         Alzheimers, dementia    Asthma Brother     No Known Problems Daughter     Asthma Son     Other Son         sleep apnea, narcelpsy    Leukemia Maternal Grandfather     Dementia Paternal Grandmother     Corneal ulcer Other     Parkinsonism Other        Social History     Socioeconomic History    Marital status:    Tobacco Use    Smoking status: Never    Smokeless tobacco: Never   Vaping Use    Vaping status: Never Used   Substance and Sexual Activity     "Alcohol use: Yes     Comment: occasional beer.    Drug use: Never    Sexual activity: Defer       /61   Pulse 70   Temp 98.1 °F (36.7 °C)   Resp 18   Ht 162.6 cm (64\")   Wt 126 kg (277 lb)   SpO2 98%   BMI 47.55 kg/m²       Objective   Physical Exam  Vitals and nursing note reviewed.   Constitutional:       Appearance: She is well-developed.   HENT:      Head: Normocephalic and atraumatic.      Mouth/Throat:      Mouth: Mucous membranes are moist.   Eyes:      Pupils: Pupils are equal, round, and reactive to light.   Cardiovascular:      Rate and Rhythm: Normal rate and regular rhythm.      Heart sounds: Normal heart sounds.   Pulmonary:      Effort: Pulmonary effort is normal. No respiratory distress.      Breath sounds: Normal breath sounds.   Abdominal:      General: Bowel sounds are normal.      Palpations: Abdomen is soft.      Tenderness: There is no abdominal tenderness.   Skin:     General: Skin is warm and dry.   Neurological:      Mental Status: She is alert.      Comments: Very subtle right-sided facial droop.  No other focal motor or sensory deficit appreciated.  There is no dysarthria or aphasia.         Procedures           ED Course      Results for orders placed or performed during the hospital encounter of 08/27/24   Comprehensive Metabolic Panel    Specimen: Arm, Left; Blood   Result Value Ref Range    Glucose 112 (H) 65 - 99 mg/dL    BUN 12 8 - 23 mg/dL    Creatinine 0.73 0.57 - 1.00 mg/dL    Sodium 140 136 - 145 mmol/L    Potassium 3.8 3.5 - 5.2 mmol/L    Chloride 103 98 - 107 mmol/L    CO2 24.9 22.0 - 29.0 mmol/L    Calcium 9.4 8.6 - 10.5 mg/dL    Total Protein 7.9 6.0 - 8.5 g/dL    Albumin 4.2 3.5 - 5.2 g/dL    ALT (SGPT) 22 1 - 33 U/L    AST (SGOT) 21 1 - 32 U/L    Alkaline Phosphatase 82 39 - 117 U/L    Total Bilirubin 0.6 0.0 - 1.2 mg/dL    Globulin 3.7 gm/dL    A/G Ratio 1.1 g/dL    BUN/Creatinine Ratio 16.4 7.0 - 25.0    Anion Gap 12.1 5.0 - 15.0 mmol/L    eGFR 90.8 >60.0 " mL/min/1.73   Protime-INR    Specimen: Blood   Result Value Ref Range    Protime 10.7 9.6 - 11.7 Seconds    INR 0.98 0.93 - 1.10   aPTT    Specimen: Blood   Result Value Ref Range    PTT 21.1 (L) 24.0 - 31.0 seconds   Single High Sensitivity Troponin T    Specimen: Arm, Left; Blood   Result Value Ref Range    HS Troponin T 35 (H) <14 ng/L   CBC Auto Differential    Specimen: Blood   Result Value Ref Range    WBC 9.30 3.40 - 10.80 10*3/mm3    RBC 4.80 3.77 - 5.28 10*6/mm3    Hemoglobin 14.0 12.0 - 15.9 g/dL    Hematocrit 42.6 34.0 - 46.6 %    MCV 88.8 79.0 - 97.0 fL    MCH 29.2 26.6 - 33.0 pg    MCHC 32.9 31.5 - 35.7 g/dL    RDW 13.5 12.3 - 15.4 %    RDW-SD 44.1 37.0 - 54.0 fl    MPV 9.2 6.0 - 12.0 fL    Platelets 306 140 - 450 10*3/mm3    Neutrophil % 71.8 42.7 - 76.0 %    Lymphocyte % 19.2 (L) 19.6 - 45.3 %    Monocyte % 6.5 5.0 - 12.0 %    Eosinophil % 1.5 0.3 - 6.2 %    Basophil % 0.6 0.0 - 1.5 %    Immature Grans % 0.4 0.0 - 0.5 %    Neutrophils, Absolute 6.67 1.70 - 7.00 10*3/mm3    Lymphocytes, Absolute 1.79 0.70 - 3.10 10*3/mm3    Monocytes, Absolute 0.60 0.10 - 0.90 10*3/mm3    Eosinophils, Absolute 0.14 0.00 - 0.40 10*3/mm3    Basophils, Absolute 0.06 0.00 - 0.20 10*3/mm3    Immature Grans, Absolute 0.04 0.00 - 0.05 10*3/mm3    Differential Type Auto     WBC 9.30 3.40 - 10.80 10*3/mm3   Urinalysis With Culture If Indicated - Urine, Clean Catch    Specimen: Urine, Clean Catch   Result Value Ref Range    Color, UA Yellow Yellow, Straw    Appearance, UA Hazy (A) Clear    pH, UA 7.5 5.0 - 8.0    Specific Gravity, UA 1.069 (H) 1.005 - 1.030    Glucose, UA Negative Negative    Ketones, UA Negative Negative    Bilirubin, UA Negative Negative    Blood, UA Negative Negative    Protein, UA Negative Negative    Leuk Esterase, UA Moderate (2+) (A) Negative    Nitrite, UA Negative Negative    Urobilinogen, UA 1.0 E.U./dL 0.2 - 1.0 E.U./dL   Protime-INR    Specimen: Blood   Result Value Ref Range    Protime 11.2 9.6 -  11.7 Seconds    INR 1.03 0.93 - 1.10   aPTT    Specimen: Blood   Result Value Ref Range    PTT 29.1 24.0 - 31.0 seconds   CBC Auto Differential    Specimen: Blood   Result Value Ref Range    WBC 10.59 3.40 - 10.80 10*3/mm3    RBC 4.76 3.77 - 5.28 10*6/mm3    Hemoglobin 13.5 12.0 - 15.9 g/dL    Hematocrit 42.1 34.0 - 46.6 %    MCV 88.4 79.0 - 97.0 fL    MCH 28.4 26.6 - 33.0 pg    MCHC 32.1 31.5 - 35.7 g/dL    RDW 13.7 12.3 - 15.4 %    RDW-SD 44.5 37.0 - 54.0 fl    MPV 9.4 6.0 - 12.0 fL    Platelets 337 140 - 450 10*3/mm3    Neutrophil % 74.6 42.7 - 76.0 %    Lymphocyte % 18.0 (L) 19.6 - 45.3 %    Monocyte % 5.4 5.0 - 12.0 %    Eosinophil % 0.9 0.3 - 6.2 %    Basophil % 0.7 0.0 - 1.5 %    Immature Grans % 0.4 0.0 - 0.5 %    Neutrophils, Absolute 7.90 (H) 1.70 - 7.00 10*3/mm3    Lymphocytes, Absolute 1.91 0.70 - 3.10 10*3/mm3    Monocytes, Absolute 0.57 0.10 - 0.90 10*3/mm3    Eosinophils, Absolute 0.10 0.00 - 0.40 10*3/mm3    Basophils, Absolute 0.07 0.00 - 0.20 10*3/mm3    Immature Grans, Absolute 0.04 0.00 - 0.05 10*3/mm3    nRBC 0.0 0.0 - 0.2 /100 WBC   TSH    Specimen: Arm, Left; Blood   Result Value Ref Range    TSH 1.740 0.270 - 4.200 uIU/mL   Hemoglobin A1c    Specimen: Blood   Result Value Ref Range    Hemoglobin A1C 5.78 (H) 4.80 - 5.60 %   Lipid Panel    Specimen: Arm, Left; Blood   Result Value Ref Range    Total Cholesterol 208 (H) 0 - 200 mg/dL    Triglycerides 144 0 - 150 mg/dL    HDL Cholesterol 46 40 - 60 mg/dL    LDL Cholesterol  136 (H) 0 - 100 mg/dL    VLDL Cholesterol 26 5 - 40 mg/dL    LDL/HDL Ratio 2.90    Urinalysis, Microscopic Only - Urine, Clean Catch    Specimen: Urine, Clean Catch   Result Value Ref Range    RBC, UA 0-2 None Seen, 0-2 /HPF    WBC, UA 21-50 (A) None Seen, 0-2 /HPF    Bacteria, UA 1+ (A) None Seen /HPF    Squamous Epithelial Cells, UA 0-2 None Seen, 0-2 /HPF    Hyaline Casts, UA None Seen None Seen /LPF    Methodology Manual Light Microscopy    POC Glucose Once    Specimen:  Blood   Result Value Ref Range    Glucose 124 (H) 70 - 105 mg/dL   ECG 12 Lead Stroke Evaluation   Result Value Ref Range    QT Interval 425 ms    QTC Interval 454 ms   Type & Screen    Specimen: Blood   Result Value Ref Range    ABO Type A     RH type Positive     Antibody Screen Negative     T&S Expiration Date 8/30/2024 11:59:59 PM    Green Top (Gel)   Result Value Ref Range    Extra Tube Hold for add-ons.    Lavender Top   Result Value Ref Range    Extra Tube hold for add-on    Gold Top - SST   Result Value Ref Range    Extra Tube Hold for add-ons.    Light Blue Top   Result Value Ref Range    Extra Tube Hold for add-ons.      XR Chest 1 View    Result Date: 8/27/2024  Impression: Mild cardiomegaly. No acute cardiopulmonary findings. Electronically Signed: Kalani Aguillon MD  8/27/2024 11:12 AM EDT  Workstation ID: [id number]    CT Angiogram Head w AI Analysis of LVO    Result Date: 8/27/2024  Impression: Multifocal cervical and intracranial atherosclerotic changes are present, without evidence of focal severe stenosis, large vessel occlusion or aneurysm. 50 to 60% stenosis is present at the right ICA origin. Electronically Signed: Mega Ortega MD  8/27/2024 10:56 AM EDT  Workstation ID: [id number]    CT Angiogram Neck    Result Date: 8/27/2024  Impression: Multifocal cervical and intracranial atherosclerotic changes are present, without evidence of focal severe stenosis, large vessel occlusion or aneurysm. 50 to 60% stenosis is present at the right ICA origin. Electronically Signed: Mega Ortega MD  8/27/2024 10:56 AM EDT  Workstation ID: [id number]    CT Head Without Contrast Stroke Protocol    Result Date: 8/27/2024  No acute intracranial abnormality. Atrophy and chronic white matter changes compatible with sequela of small vessel ischemic disease and remote ischemic insults. No definite acute abnormality by CT noted.. Electronically Signed: Modesto Rahman MD  8/27/2024 10:34 AM EDT  Workstation ID: [id number]                        Total (NIH Stroke Scale): 1        My interpretation of EKG shows sinus rhythm, rate of 68, no ST elevation          Medical Decision Making  Amount and/or Complexity of Data Reviewed  Labs: ordered.  Radiology: ordered.  ECG/medicine tests: ordered.    Risk  Prescription drug management.  Decision regarding hospitalization.      Patient had the above medication.  Results were discussed with the patient.  Code stroke was initiated on patient arrival.  CT head shows no acute intracranial abnormality.  CTA head and neck shows multiple areas of atherosclerotic changes without evidence of focal severe stenosis, large vessel occlusion, or aneurysm.  My interpretation of chest x-ray shows no acute infiltrate or effusion.  EKG shows no acute ischemia.  White blood cell count is normal.  CMP is unremarkable.  Troponin is borderline elevated at 35.  Patient is having no chest pain or shortness of breath.  I discussed with neurology who also saw the patient in the emergency room.  Patient was not felt to be a TNK candidate as her symptoms were very mild and improving.  Patient was found to have a urinary tract infection and was started on Rocephin.  I discussed with the hospitalist and the patient will be admitted for further evaluation and management.      Final diagnoses:   Confusion   Urinary tract infection without hematuria, site unspecified       ED Disposition  ED Disposition       ED Disposition   Decision to Admit    Condition   --    Comment   Level of Care: Telemetry [5]   Diagnosis: Confusion [[id number]]   Certification: I Certify That Inpatient Hospital Services Are Medically Necessary For Greater Than 2 Midnights                 No follow-up provider specified.       Medication List      No changes were made to your prescriptions during this visit.            Santi Alexander MD  08/27/24 0537

## 2024-08-27 NOTE — PLAN OF CARE
Goal Outcome Evaluation:  Plan of Care Reviewed With: patient, spouse           Outcome Evaluation: Pt is a 64 y.o. female presenting to ED with reports of confusion. Pt was out driving & couldn't recall how to get to where she was going. She called spouse & he walked her through how to get to Urgent Care to be seen. Pt transferred to [facility] for CVA work-up. CT (-) & MRI is currently pending. PMH: MS, memory loss, carotid dx, obesity. At baseline, pt lives with spouse in 2 story home with 2 JULIANNE. Her bedroom & full bath is on 2nd floor. Pt is ind with BADLs & IADLs & uses 2 hurry canes when ambulating. Upon eval, pt is A&O X4. She was independent with topographical orientation while ambulating in ED. She demo ind with UB dressing task & simulated LB dressing/toileting tasks. She has no detected visual/perceptual, sensation, strength, ROM or balance deficits affecting her ADL or mobility status below her baseline status. Pt & spouse educated on stroke s/s & risk factors. Pt able to recall information upon review. No further OT tx recommended at this time. Recommend home with spouse once medically cleared. OT will sign off at this time.      Anticipated Discharge Disposition (OT): home

## 2024-08-27 NOTE — NURSING NOTE
1405 received patient to cdu 2 pt oriented to room. Beside commode provided.  Report received from Blanca STARK

## 2024-08-27 NOTE — CONSULTS
Primary Care Provider: No ref. provider found     Consult requested by:  Dr. Alexander    Reason for Consultation: Neurological evaluation     History taken from: patient chart RN    Chief complaint: Confusion       SUBJECTIVE:    History of present illness: Background per H&P: Mackenzie Cornell is a 64 y.o. year old female who presented to the ED on 8/27/24 with c/o confusion. She states her last known was around 9:30 am. She states she was driving and suddenly felt confused and did not know where she was. She recognized [employer] so she thought she could make it home, but was too confused. She denied any focal deficits or loss of consciousness.     Code stroke initiated upon her arrival. Pt evaluated in CT by neurology. She was noted to be AA&Ox3 and stated her confusion had resolved and she felt better. She has chronic right leg weakness that is unchanged. Otherwise, she denies any deficits. No headache. NIH 1. She is not a TNK candidate due to NIH 1 with no new focal deficits and rapid resolution of symptoms. BP was >170 systolic so pt was treated with labetalol 10mg IV. She denies any significant health history and states she takes no medication. Records show pt was previously diagnosed with MS, though details are limited. She has recently been referred to neuropsychiatry for concerns of cognitive impairment.     CT head is unremarkable. CTA head/neck shows multifocal cervical and intracranial atherosclerotic changes are present, without evidence of focal severe stenosis, large vessel occlusion or aneurysm. 50 to 60% stenosis is present at the right ICA origin.   - Portions of the above HPI were copied from previous encounters and edited as appropriate. PMH as detailed below.     Review of Systems   Constitutional:  Negative for chills, diaphoresis and fatigue.   Eyes:  Negative for photophobia and visual disturbance.   Neurological:  Negative for dizziness, tremors, seizures, syncope, facial asymmetry, speech  difficulty, weakness, light-headedness, numbness and headaches.   Psychiatric/Behavioral:  Negative for confusion.           PATIENT HISTORY:  Past Medical History:   Diagnosis Date    Multiple sclerosis     Transient global amnesia     about 10 years, and resolved within 1 day    Urinary tract infection    ,   Past Surgical History:   Procedure Laterality Date    COLONOSCOPY      TONSILLECTOMY      WISDOM TOOTH EXTRACTION     ,   Family History   Problem Relation Age of Onset    Hypertension Mother     Heart disease Mother         A-fib    Other Father         Alzheimers, dementia    Asthma Brother     No Known Problems Daughter     Asthma Son     Other Son         sleep apnea, narcelpsy    Leukemia Maternal Grandfather     Dementia Paternal Grandmother     Corneal ulcer Other     Parkinsonism Other    ,   Social History     Tobacco Use    Smoking status: Never    Smokeless tobacco: Never   Vaping Use    Vaping status: Never Used   Substance Use Topics    Alcohol use: Yes     Comment: occasional beer.    Drug use: Never   ,   Prior to Admission medications    Medication Sig Start Date End Date Taking? Authorizing Provider   Multiple Vitamins-Minerals (MULTI FOR HER) pack 1 tablet Daily. 7/11/16   Provider, MD Ava   nitrofurantoin, macrocrystal-monohydrate, (MACROBID) 100 MG capsule Take 1 capsule by mouth 2 (Two) Times a Day. 12/21/23   Yarely Chand APRN   simvastatin (ZOCOR) 10 MG tablet Take 1 tablet by mouth Every Night. 2/6/24   Yarely Chand APRN    Allergies:  Sulfa antibiotics    Current Facility-Administered Medications   Medication Dose Route Frequency Provider Last Rate Last Admin    [START ON 8/28/2024] aspirin chewable tablet 81 mg  81 mg Oral Daily Avelina Cook APRN        atorvastatin (LIPITOR) tablet 80 mg  80 mg Oral Nightly Avelina Cook APRN        cefTRIAXone (ROCEPHIN) 1,000 mg in sodium chloride 0.9 % 100 mL MBP  1,000 mg Intravenous Once Santi Alexander MD         morphine injection 2 mg  2 mg Intravenous Once Santi Alexander MD        ondansetron (ZOFRAN) injection 4 mg  4 mg Intravenous Once Santi Alexander MD        sodium chloride 0.9 % flush 10 mL  10 mL Intravenous PRN Santi Alexander MD         Current Outpatient Medications   Medication Sig Dispense Refill    Multiple Vitamins-Minerals (MULTI FOR HER) pack 1 tablet Daily.      nitrofurantoin, macrocrystal-monohydrate, (MACROBID) 100 MG capsule Take 1 capsule by mouth 2 (Two) Times a Day. 14 capsule 0    simvastatin (ZOCOR) 10 MG tablet Take 1 tablet by mouth Every Night. 30 tablet 12        ________________________________________________________        OBJECTIVE:  Upon today's exam, pt is awake and alert.      Neurologic Exam  PHYSICAL EXAM:    CONSTITUTIONAL: The patient is in no apparent distress, bright awake and alert.      HEENT: There is no tenderness over the temporal arteries bilaterally. Normocephalic, atraumatic. TMJ open symmetrically without tenderness.    NECK: ROM normal, supple    RESPIRATORY: Breathing unlabored, Breath sounds are normal    CARDIAC: Regular rate and rhythm.     EXTREMITIES:  No clubbing, cyanosis or edema.    PSYCHIATRIC: Mood/affect normal, judgement normal, appropriate    NEUROLOGICAL:    Cognition:   Fully oriented.  Fund of knowledge excellent.  Concentration and attention normal.   Language normal with normal comprehension, fluent speech, intact repetition and naming.     Cranial nerves;    II - pupils bilaterally equal reacting to light,  No new Visual field deficits;  Fundoscopic exam- Not able to be done, non-dilated exam  III,IV,VI: EOMI with no diplopia  V: Normal facial sensations  VII: No facial asymmetry. Slight asymmetry of the mouth when speaking, but smile is symmetric without any weakness.   VIII: No New hearing abnormality  IX, X, XI: normal gag and shoulder shrug;  XII: tongue is in the midline.    Sensory:  Intact to light touch in all extremities.     Motor:  Strength: RUE 5/5, LUE 5/5, RLE 4/5 with drift (chronic, baseline), LLE 5/5. No involuntary movements present. Normal tone and bulk.  Deep tendon reflexes: 2/4 and symmetrical in biceps, brachioradialis, triceps, bilateral 0/4 knees and ankles. Both plantars are flexor.    Cerebellar: Finger to nose and mirror movements normal bilaterally.    Gait and balance: deferred    Interval: 24 hrs post onset of symptoms +/- 20 mins  1a. Level of Consciousness: 0-->Alert, keenly responsive  1b. LOC Questions: 0-->Answers both questions correctly  1c. LOC Commands: 0-->Performs both tasks correctly  2. Best Gaze: 0-->Normal  3. Visual: 0-->No visual loss  4. Facial Palsy: 0-->Normal symmetrical movements  5a. Motor Arm, Left: 0-->No drift, limb holds 90 (or 45) degrees for full 10 secs  5b. Motor Arm, Right: 0-->No drift, limb holds 90 (or 45) degrees for full 10 secs  6a. Motor Leg, Left: 0-->No drift, leg holds 30 degree position for full 5 secs  6b. Motor Leg, Right: 1-->Drift, leg falls by the end of the 5-sec period but does not hit bed  7. Limb Ataxia: 0-->Absent  8. Sensory: 0-->Normal, no sensory loss  9. Best Language: 0-->No aphasia, normal  10. Dysarthria: 0-->Normal  11. Extinction and Inattention (formerly Neglect): 0-->No abnormality    Total (NIH Stroke Scale): 1    ________________________________________________________   RESULTS REVIEW:    VITAL SIGNS:   Temp:  [98.1 °F (36.7 °C)] 98.1 °F (36.7 °C)  Heart Rate:  [68-91] 70  Resp:  [18-22] 18  BP: (146-195)/(53-73) 146/61     LABS:      Lab 08/27/24  1137 08/27/24  1030   WBC 10.59 9.30  9.30   HEMOGLOBIN 13.5 14.0   HEMATOCRIT 42.1 42.6   PLATELETS 337 306   NEUTROS ABS 7.90* 6.67   IMMATURE GRANS (ABS) 0.04 0.04   LYMPHS ABS 1.91 1.79   MONOS ABS 0.57 0.60   EOS ABS 0.10 0.14   MCV 88.4 88.8   PROTIME 11.2 10.7   APTT 29.1 21.1*         Lab 08/27/24  1030   SODIUM 140   POTASSIUM 3.8   CHLORIDE 103   CO2 24.9   ANION GAP 12.1   BUN 12   CREATININE 0.73    EGFR 90.8   GLUCOSE 112*   CALCIUM 9.4   HEMOGLOBIN A1C 5.78*   TSH 1.740         Lab 08/27/24  1030   TOTAL PROTEIN 7.9   ALBUMIN 4.2   GLOBULIN 3.7   ALT (SGPT) 22   AST (SGOT) 21   BILIRUBIN 0.6   ALK PHOS 82         Lab 08/27/24  1137 08/27/24  1030   HSTROP T  --  35*   PROTIME 11.2 10.7   INR 1.03 0.98         Lab 08/27/24  1030   CHOLESTEROL 208*   LDL CHOL 136*   HDL CHOL 46   TRIGLYCERIDES 144         Lab 08/27/24  1040   ABO TYPING A   RH TYPING Positive   ANTIBODY SCREEN Negative         UA          2/14/2024    11:45 3/15/2024    12:54 8/27/2024    11:37   Urinalysis   Squamous Epithelial Cells, UA   0-2    Specific Gravity, UA   1.069    Ketones, UA Negative  Negative  Negative    Blood, UA   Negative    Leukocytes, UA Small (1+)  Small (1+)  Moderate (2+)    Nitrite, UA   Negative    RBC, UA   0-2    WBC, UA   21-50    Bacteria, UA   1+        Lab Results   Component Value Date    TSH 1.740 08/27/2024     (H) 08/27/2024    HGBA1C 5.78 (H) 08/27/2024    [id number] 12/18/2023       IMAGING STUDIES:  XR Chest 1 View    Result Date: 8/27/2024  Impression: Mild cardiomegaly. No acute cardiopulmonary findings. Electronically Signed: Kalani Aguillon MD  8/27/2024 11:12 AM EDT  Workstation ID: [id number]    CT Angiogram Head w AI Analysis of LVO    Result Date: 8/27/2024  Impression: Multifocal cervical and intracranial atherosclerotic changes are present, without evidence of focal severe stenosis, large vessel occlusion or aneurysm. 50 to 60% stenosis is present at the right ICA origin. Electronically Signed: Mega Ortega MD  8/27/2024 10:56 AM EDT  Workstation ID: [id number]    CT Angiogram Neck    Result Date: 8/27/2024  Impression: Multifocal cervical and intracranial atherosclerotic changes are present, without evidence of focal severe stenosis, large vessel occlusion or aneurysm. 50 to 60% stenosis is present at the right ICA origin. Electronically Signed: Mega Ortega MD  8/27/2024 10:56 AM  EDT  Workstation ID: [id number]    CT Head Without Contrast Stroke Protocol    Result Date: 8/27/2024  No acute intracranial abnormality. Atrophy and chronic white matter changes compatible with sequela of small vessel ischemic disease and remote ischemic insults. No definite acute abnormality by CT noted.. Electronically Signed: Modesto Rahman MD  8/27/2024 10:34 AM EDT  Workstation ID: [id number]     I reviewed the patient's new clinical results.    ________________________________________________________     PROBLEM LIST:    Confusion            ASSESSMENT/PLAN:  1. Acute confusional state, resolved. Suspect encephalopathy second to hypertensive urgency and UTI. No convincing evidence for stroke on exam.  Per records, pt's  has expressed concerns regarding pt's cognition, but pt did not concur. Workup ongoing to r/o infection, metabolic abnormalities, or other causes.   - CT head: No acute intracranial abnormality. Atrophy and chronic white matter changes compatible with sequela of small vessel ischemic disease and remote ischemic insults. No definite acute abnormality by CT noted  - CTA head and neck: Multifocal cervical and intracranial atherosclerotic changes are present, without evidence of focal severe stenosis, large vessel occlusion or aneurysm. 50 to 60% stenosis is present at the right ICA origin.   - Recommend MRI brain if recurrence or if pt develops new symptoms   - EKG: Sinus rhythm. Probable left atrial enlargement  - Labs: A1C: 5.78, B12: P, LDL: 136, TSH: 1.74, UA: 1+bacteria  - PT/OT/ST as appropriate, fall precautions as appropriate, Neuro checks per protocol, DVT prophylaxis, Stroke education     2. Carotid disease  - Recommend ASA and statin   - Follow up with outpt vascular for monitoring     3. UTI  - Rocephin started per ED    4. Hypertensive urgency  - Treated with Labetalol 10mg IV during code stroke  - Per primary     5. HLD  - Statin, recommend diet and lifestyle  "modifications    6. MS, diagnosed in 2003  - Follows with Dr. Gonzales at [facility]   - Per Dr. Gonzales: \"About 22 years ago-  She woke up with numbness over the feet bilaterally. No associated weakness over the extremities, bladder or bowel symptoms   She was seen with her chiropractor, who recommended to be evaluated by Neurology   She was evaluated Dr Drew Quinones at  with Neurology, had MRIs, had CSF  Dx with MS. Symptoms did not improved. She was given the option of commencing DMT, that she opted to continue off MS DMT.\"    Modification of stroke risk factors:   - Blood pressure should be less than 130/80 outpatient, HbA1c less than 6.5, LDL less than 70; b12>500 and smoking cessation if applicable. We would be grateful if the primary team / primary care physician would keep a close watch on the above targets.  - Stroke education  - Follow up with neurologist of choice    Will sign off. Please call with questions or concerns.       I discussed the patient's findings and my recommendations with patient, nursing staff, and consulting provider    HANK Suarez  08/27/24  12:34 EDT        "

## 2024-08-28 LAB
ALBUMIN SERPL-MCNC: 4.1 G/DL (ref 3.5–5.2)
ALBUMIN/GLOB SERPL: 1.1 G/DL
ALP SERPL-CCNC: 80 U/L (ref 39–117)
ALT SERPL W P-5'-P-CCNC: 19 U/L (ref 1–33)
ANION GAP SERPL CALCULATED.3IONS-SCNC: 13.1 MMOL/L (ref 5–15)
AST SERPL-CCNC: 24 U/L (ref 1–32)
BACTERIA BLD CULT: ABNORMAL
BACTERIA SPEC AEROBE CULT: NO GROWTH
BASOPHILS # BLD AUTO: 0.05 10*3/MM3 (ref 0–0.2)
BASOPHILS NFR BLD AUTO: 0.6 % (ref 0–1.5)
BILIRUB SERPL-MCNC: 0.4 MG/DL (ref 0–1.2)
BOTTLE TYPE: ABNORMAL
BUN SERPL-MCNC: 14 MG/DL (ref 8–23)
BUN/CREAT SERPL: 19.4 (ref 7–25)
CALCIUM SPEC-SCNC: 9.3 MG/DL (ref 8.6–10.5)
CHLORIDE SERPL-SCNC: 104 MMOL/L (ref 98–107)
CO2 SERPL-SCNC: 22.9 MMOL/L (ref 22–29)
CREAT SERPL-MCNC: 0.72 MG/DL (ref 0.57–1)
DEPRECATED RDW RBC AUTO: 46.7 FL (ref 37–54)
EGFRCR SERPLBLD CKD-EPI 2021: 92.3 ML/MIN/1.73
EOSINOPHIL # BLD AUTO: 0.17 10*3/MM3 (ref 0–0.4)
EOSINOPHIL NFR BLD AUTO: 2 % (ref 0.3–6.2)
ERYTHROCYTE [DISTWIDTH] IN BLOOD BY AUTOMATED COUNT: 14 % (ref 12.3–15.4)
GLOBULIN UR ELPH-MCNC: 3.6 GM/DL
GLUCOSE SERPL-MCNC: 105 MG/DL (ref 65–99)
HCT VFR BLD AUTO: 43.9 % (ref 34–46.6)
HGB BLD-MCNC: 13.5 G/DL (ref 12–15.9)
IMM GRANULOCYTES # BLD AUTO: 0.03 10*3/MM3 (ref 0–0.05)
IMM GRANULOCYTES NFR BLD AUTO: 0.4 % (ref 0–0.5)
LYMPHOCYTES # BLD AUTO: 2.46 10*3/MM3 (ref 0.7–3.1)
LYMPHOCYTES NFR BLD AUTO: 28.9 % (ref 19.6–45.3)
MAGNESIUM SERPL-MCNC: 2.4 MG/DL (ref 1.6–2.4)
MCH RBC QN AUTO: 27.8 PG (ref 26.6–33)
MCHC RBC AUTO-ENTMCNC: 30.8 G/DL (ref 31.5–35.7)
MCV RBC AUTO: 90.5 FL (ref 79–97)
MONOCYTES # BLD AUTO: 0.5 10*3/MM3 (ref 0.1–0.9)
MONOCYTES NFR BLD AUTO: 5.9 % (ref 5–12)
NEUTROPHILS NFR BLD AUTO: 5.29 10*3/MM3 (ref 1.7–7)
NEUTROPHILS NFR BLD AUTO: 62.2 % (ref 42.7–76)
NRBC BLD AUTO-RTO: 0 /100 WBC (ref 0–0.2)
PLATELET # BLD AUTO: 343 10*3/MM3 (ref 140–450)
PMV BLD AUTO: 10.2 FL (ref 6–12)
POTASSIUM SERPL-SCNC: 3.9 MMOL/L (ref 3.5–5.2)
PROT SERPL-MCNC: 7.7 G/DL (ref 6–8.5)
QT INTERVAL: 425 MS
QTC INTERVAL: 454 MS
RBC # BLD AUTO: 4.85 10*6/MM3 (ref 3.77–5.28)
SODIUM SERPL-SCNC: 140 MMOL/L (ref 136–145)
WBC NRBC COR # BLD AUTO: 8.5 10*3/MM3 (ref 3.4–10.8)

## 2024-08-28 PROCEDURE — 25010000002 ENOXAPARIN PER 10 MG: Performed by: INTERNAL MEDICINE

## 2024-08-28 PROCEDURE — 87040 BLOOD CULTURE FOR BACTERIA: CPT | Performed by: STUDENT IN AN ORGANIZED HEALTH CARE EDUCATION/TRAINING PROGRAM

## 2024-08-28 PROCEDURE — 80053 COMPREHEN METABOLIC PANEL: CPT | Performed by: INTERNAL MEDICINE

## 2024-08-28 PROCEDURE — 25010000002 HYDRALAZINE PER 20 MG: Performed by: INTERNAL MEDICINE

## 2024-08-28 PROCEDURE — 96365 THER/PROPH/DIAG IV INF INIT: CPT

## 2024-08-28 PROCEDURE — 96372 THER/PROPH/DIAG INJ SC/IM: CPT

## 2024-08-28 PROCEDURE — G0378 HOSPITAL OBSERVATION PER HR: HCPCS

## 2024-08-28 PROCEDURE — 25010000002 CEFTRIAXONE PER 250 MG: Performed by: STUDENT IN AN ORGANIZED HEALTH CARE EDUCATION/TRAINING PROGRAM

## 2024-08-28 PROCEDURE — 85025 COMPLETE CBC W/AUTO DIFF WBC: CPT | Performed by: INTERNAL MEDICINE

## 2024-08-28 PROCEDURE — 83735 ASSAY OF MAGNESIUM: CPT | Performed by: INTERNAL MEDICINE

## 2024-08-28 PROCEDURE — 96375 TX/PRO/DX INJ NEW DRUG ADDON: CPT

## 2024-08-28 RX ORDER — AMLODIPINE BESYLATE 5 MG/1
5 TABLET ORAL
Status: DISCONTINUED | OUTPATIENT
Start: 2024-08-28 | End: 2024-08-28

## 2024-08-28 RX ORDER — AMLODIPINE BESYLATE 5 MG/1
5 TABLET ORAL ONCE
Status: COMPLETED | OUTPATIENT
Start: 2024-08-28 | End: 2024-08-28

## 2024-08-28 RX ORDER — AMLODIPINE BESYLATE 5 MG/1
10 TABLET ORAL
Status: DISCONTINUED | OUTPATIENT
Start: 2024-08-29 | End: 2024-08-29 | Stop reason: HOSPADM

## 2024-08-28 RX ADMIN — ENOXAPARIN SODIUM 40 MG: 100 INJECTION SUBCUTANEOUS at 14:36

## 2024-08-28 RX ADMIN — AMLODIPINE BESYLATE 5 MG: 5 TABLET ORAL at 22:07

## 2024-08-28 RX ADMIN — CEFTRIAXONE 2000 MG: 2 INJECTION, POWDER, FOR SOLUTION INTRAMUSCULAR; INTRAVENOUS at 14:36

## 2024-08-28 RX ADMIN — AMLODIPINE BESYLATE 5 MG: 5 TABLET ORAL at 18:07

## 2024-08-28 RX ADMIN — Medication 10 ML: at 20:08

## 2024-08-28 RX ADMIN — ASPIRIN 81 MG CHEWABLE TABLET 81 MG: 81 TABLET CHEWABLE at 08:00

## 2024-08-28 RX ADMIN — HYDRALAZINE HYDROCHLORIDE 10 MG: 20 INJECTION INTRAMUSCULAR; INTRAVENOUS at 00:15

## 2024-08-28 RX ADMIN — Medication 10 ML: at 08:01

## 2024-08-28 RX ADMIN — ATORVASTATIN CALCIUM 80 MG: 40 TABLET, FILM COATED ORAL at 20:08

## 2024-08-28 RX ADMIN — ACETAMINOPHEN 650 MG: 325 TABLET, FILM COATED ORAL at 15:30

## 2024-08-28 RX ADMIN — ENOXAPARIN SODIUM 40 MG: 100 INJECTION SUBCUTANEOUS at 00:15

## 2024-08-28 NOTE — PLAN OF CARE
Problem: Hypertension Comorbidity  Goal: Blood Pressure in Desired Range  Outcome: Ongoing, Progressing  Intervention: Maintain Blood Pressure Management  Recent Flowsheet Documentation  Taken 8/28/2024 0800 by Katia Montez RN  Medication Review/Management: medications reviewed     Problem: Adjustment to Illness (Stroke, Ischemic/Transient Ischemic Attack)  Goal: Optimal Coping  Outcome: Ongoing, Progressing     Problem: Bowel Elimination Impaired (Stroke, Ischemic/Transient Ischemic Attack)  Goal: Effective Bowel Elimination  Outcome: Ongoing, Progressing     Problem: Cerebral Tissue Perfusion (Stroke, Ischemic/Transient Ischemic Attack)  Goal: Optimal Cerebral Tissue Perfusion  Outcome: Ongoing, Progressing     Problem: Cognitive Impairment (Stroke, Ischemic/Transient Ischemic Attack)  Goal: Optimal Cognitive Function  Outcome: Ongoing, Progressing     Problem: Communication Impairment (Stroke, Ischemic/Transient Ischemic Attack)  Goal: Improved Communication Skills  Outcome: Ongoing, Progressing     Problem: Functional Ability Impaired (Stroke, Ischemic/Transient Ischemic Attack)  Goal: Optimal Functional Ability  Outcome: Ongoing, Progressing     Problem: Respiratory Compromise (Stroke, Ischemic/Transient Ischemic Attack)  Goal: Effective Oxygenation and Ventilation  Outcome: Ongoing, Progressing     Problem: Sensorimotor Impairment (Stroke, Ischemic/Transient Ischemic Attack)  Goal: Improved Sensorimotor Function  Outcome: Ongoing, Progressing     Problem: Swallowing Impairment (Stroke, Ischemic/Transient Ischemic Attack)  Goal: Optimal Eating and Swallowing without Aspiration  Outcome: Ongoing, Progressing     Problem: Urinary Elimination Impaired (Stroke, Ischemic/Transient Ischemic Attack)  Goal: Effective Urinary Elimination  Outcome: Ongoing, Progressing     Problem: UTI (Urinary Tract Infection)  Goal: Improved Infection Symptoms  Outcome: Ongoing, Progressing   Goal Outcome Evaluation:

## 2024-08-28 NOTE — PLAN OF CARE
Problem: Hypertension Comorbidity  Goal: Blood Pressure in Desired Range  Outcome: Ongoing, Progressing  Intervention: Maintain Blood Pressure Management  Recent Flowsheet Documentation  Taken 8/28/2024 0400 by Michelle Garza RN  Medication Review/Management: medications reviewed  Taken 8/28/2024 0200 by Michelle Garza RN  Medication Review/Management: medications reviewed  Taken 8/28/2024 0020 by Michelle Garza RN  Medication Review/Management: medications reviewed  Taken 8/27/2024 2200 by Michelle Garza RN  Medication Review/Management: medications reviewed  Taken 8/27/2024 1941 by Michelle Garza RN  Medication Review/Management: medications reviewed     Problem: Adjustment to Illness (Stroke, Ischemic/Transient Ischemic Attack)  Goal: Optimal Coping  Outcome: Ongoing, Progressing     Problem: Bowel Elimination Impaired (Stroke, Ischemic/Transient Ischemic Attack)  Goal: Effective Bowel Elimination  Outcome: Ongoing, Progressing     Problem: Cerebral Tissue Perfusion (Stroke, Ischemic/Transient Ischemic Attack)  Goal: Optimal Cerebral Tissue Perfusion  Outcome: Ongoing, Progressing     Problem: Cognitive Impairment (Stroke, Ischemic/Transient Ischemic Attack)  Goal: Optimal Cognitive Function  Outcome: Ongoing, Progressing     Problem: Communication Impairment (Stroke, Ischemic/Transient Ischemic Attack)  Goal: Improved Communication Skills  Outcome: Ongoing, Progressing  Intervention: Optimize Communication Skills  Recent Flowsheet Documentation  Taken 8/28/2024 0400 by Michelle Garza RN  Communication Enhancement Strategies: call light answered in person  Taken 8/28/2024 0020 by Michelle Garza RN  Communication Enhancement Strategies: call light answered in person  Taken 8/27/2024 1941 by Michelle Garza RN  Communication Enhancement Strategies: call light answered in person     Problem: Functional Ability Impaired (Stroke, Ischemic/Transient Ischemic Attack)  Goal: Optimal Functional Ability  Outcome: Ongoing,  Progressing  Intervention: Optimize Functional Ability  Recent Flowsheet Documentation  Taken 8/28/2024 0020 by Michelle Garza, RN  Activity Management:   up in chair   ambulated to bathroom  Taken 8/27/2024 1941 by Michelle Garza, RN  Activity Management:   activity encouraged   up in chair     Problem: Respiratory Compromise (Stroke, Ischemic/Transient Ischemic Attack)  Goal: Effective Oxygenation and Ventilation  Outcome: Ongoing, Progressing  Intervention: Optimize Oxygenation and Ventilation  Recent Flowsheet Documentation  Taken 8/28/2024 0400 by Michelle Garza, RN  Head of Bed (HOB) Positioning: HOB elevated     Problem: Sensorimotor Impairment (Stroke, Ischemic/Transient Ischemic Attack)  Goal: Improved Sensorimotor Function  Outcome: Ongoing, Progressing     Problem: Swallowing Impairment (Stroke, Ischemic/Transient Ischemic Attack)  Goal: Optimal Eating and Swallowing without Aspiration  Outcome: Ongoing, Progressing     Problem: Urinary Elimination Impaired (Stroke, Ischemic/Transient Ischemic Attack)  Goal: Effective Urinary Elimination  Outcome: Ongoing, Progressing     Problem: UTI (Urinary Tract Infection)  Goal: Improved Infection Symptoms  Outcome: Ongoing, Progressing   Goal Outcome Evaluation:

## 2024-08-28 NOTE — PROGRESS NOTES
[facility] MEDICINE SERVICE  DAILY PROGRESS NOTE    NAME: Mackenzie Cornell  : 1960  MRN: [id number]      LOS: 1 day     PROVIDER OF SERVICE: Jeanna Munoz MD    Chief Complaint: Confusion    Subjective:     Interval History:  History taken from: Patient and patient's chart   Patient Complaints: alert and awake this am without confusion, denies any new complaints   Patient Denies:  chest pain, shortness of breath, chest pain     Review of Systems:   Review of Systems  All negative except above   Objective:     Vital Signs  Temp:  [97.3 °F (36.3 °C)-98.1 °F (36.7 °C)] 97.3 °F (36.3 °C)  Heart Rate:  [67-78] 78  Resp:  [9-19] 9  BP: (144-172)/(67-92) 144/81   Body mass index is 47.55 kg/m².    Physical Exam  General: Alert and oriented, no acute distress.  HENT: Normocephalic, moist oral mucosa, no scleral icterus.  Neck: Supple, non-tender, no carotid bruits, no JVD, no LAD.  Lungs: Clear to auscultation, non-labored respiration.  Heart: RRR, no murmur, gallop or edema.  Abdomen: Soft, nontender, non-distended, + bowel sounds.  Musculoskeletal: Normal range of motion and strength, no tenderness or swelling.  Neuro: alert and awake, moving all 4 extremities   Skin: Skin is warm, dry and pink, no rashes or lesions.  Psychiatric: Cooperative, appropriate mood and affect.        Scheduled Meds   aspirin, 81 mg, Oral, Daily  atorvastatin, 80 mg, Oral, Nightly  cefTRIAXone, 2,000 mg, Intravenous, Q24H  enoxaparin, 40 mg, Subcutaneous, Q12H  sodium chloride, 10 mL, Intravenous, Q12H       PRN Meds     acetaminophen **OR** acetaminophen **OR** acetaminophen    senna-docusate sodium **AND** polyethylene glycol **AND** bisacodyl **AND** bisacodyl    Calcium Replacement - Follow Nurse / BPA Driven Protocol    hydrALAZINE    Magnesium Standard Dose Replacement - Follow Nurse / BPA Driven Protocol    ondansetron ODT **OR** ondansetron    Phosphorus Replacement - Follow Nurse / BPA Driven Protocol     Potassium Replacement - Follow Nurse / BPA Driven Protocol    sodium chloride    sodium chloride    sodium chloride   Infusions         Diagnostic Data    Results from last 7 days   Lab Units 08/28/24  0321   WBC 10*3/mm3 8.50   HEMOGLOBIN g/dL 13.5   HEMATOCRIT % 43.9   PLATELETS 10*3/mm3 343   GLUCOSE mg/dL 105*   CREATININE mg/dL 0.72   BUN mg/dL 14   SODIUM mmol/L 140   POTASSIUM mmol/L 3.9   AST (SGOT) U/L 24   ALT (SGPT) U/L 19   ALK PHOS U/L 80   BILIRUBIN mg/dL 0.4   ANION GAP mmol/L 13.1       MRI Brain Without Contrast    Result Date: 8/27/2024  Impression: No acute intracranial finding. Electronically Signed: Denilson Bowman  8/27/2024 6:18 PM EDT  Workstation ID: [id number]    XR Chest 1 View    Result Date: 8/27/2024  Impression: Mild cardiomegaly. No acute cardiopulmonary findings. Electronically Signed: Kalani Aguillon MD  8/27/2024 11:12 AM EDT  Workstation ID: [id number]    CT Angiogram Head w AI Analysis of LVO    Result Date: 8/27/2024  Impression: Multifocal cervical and intracranial atherosclerotic changes are present, without evidence of focal severe stenosis, large vessel occlusion or aneurysm. 50 to 60% stenosis is present at the right ICA origin. Electronically Signed: Mega Ortega MD  8/27/2024 10:56 AM EDT  Workstation ID: [id number]    CT Angiogram Neck    Result Date: 8/27/2024  Impression: Multifocal cervical and intracranial atherosclerotic changes are present, without evidence of focal severe stenosis, large vessel occlusion or aneurysm. 50 to 60% stenosis is present at the right ICA origin. Electronically Signed: Mega Ortega MD  8/27/2024 10:56 AM EDT  Workstation ID: [id number]    CT Head Without Contrast Stroke Protocol    Result Date: 8/27/2024  No acute intracranial abnormality. Atrophy and chronic white matter changes compatible with sequela of small vessel ischemic disease and remote ischemic insults. No definite acute abnormality by CT noted.. Electronically Signed: [place]  "MD Josiah  8/27/2024 10:34 AM EDT  Workstation ID: [id number]       I have reviewed patient labs and imaging     Assessment/Plan:     Active and Resolved Problems    # Acute encephalopathy resolved.   Suspect encephalopathy second to hypertensive urgency and UTI.   Neurology evaluated- stroke work up negative      # Carotid disease  - continue ASA and statin   - Follow up with outpt vascular for monitoring      # UTI  - continue Rocephin     # Hypertensive urgency  - Treated with Labetalol 10mg IV during code stroke  - monitor BP and adjust medications     # HLD  - Statin     # MS, diagnosed in 2003  - Follows with Dr. Gonzales at [place]   - Per Dr. Gonzales: \"About 22 years ago-  She woke up with numbness over the feet bilaterally. No associated weakness over the extremities, bladder or bowel symptoms   She was seen with her chiropractor, who recommended to be evaluated by Neurology   She was evaluated Dr Drew Quinones at  with Neurology, had MRIs, had CSF  Dx with MS. Symptoms did not improved. She was given the option of commencing DMT, that she opted to continue off MS DMT.\"    VTE Prophylaxis:  Pharmacologic VTE prophylaxis orders are present.         Code status is   There are no questions and answers to display.       Plan for disposition:8/29    Time: 30 minutes    Signature: Electronically signed by Jeanna Munoz MD, 08/28/24, 15:32 EDT.  [facility] Hospitalist Team  "

## 2024-08-29 ENCOUNTER — READMISSION MANAGEMENT (OUTPATIENT)
Dept: CALL CENTER | Facility: HOSPITAL | Age: 64
End: 2024-08-29
Payer: COMMERCIAL

## 2024-08-29 VITALS
BODY MASS INDEX: 47.29 KG/M2 | SYSTOLIC BLOOD PRESSURE: 165 MMHG | WEIGHT: 277 LBS | HEIGHT: 64 IN | TEMPERATURE: 98.6 F | DIASTOLIC BLOOD PRESSURE: 76 MMHG | RESPIRATION RATE: 12 BRPM | HEART RATE: 80 BPM | OXYGEN SATURATION: 98 %

## 2024-08-29 LAB
BACTERIA SPEC AEROBE CULT: ABNORMAL
GRAM STN SPEC: ABNORMAL
GRAM STN SPEC: ABNORMAL
ISOLATED FROM: ABNORMAL

## 2024-08-29 PROCEDURE — 96372 THER/PROPH/DIAG INJ SC/IM: CPT

## 2024-08-29 PROCEDURE — 25010000002 CEFTRIAXONE PER 250 MG: Performed by: STUDENT IN AN ORGANIZED HEALTH CARE EDUCATION/TRAINING PROGRAM

## 2024-08-29 PROCEDURE — 25010000002 ENOXAPARIN PER 10 MG: Performed by: INTERNAL MEDICINE

## 2024-08-29 PROCEDURE — G0378 HOSPITAL OBSERVATION PER HR: HCPCS

## 2024-08-29 RX ORDER — AMLODIPINE BESYLATE 10 MG/1
10 TABLET ORAL
Qty: 30 TABLET | Refills: 0 | Status: SHIPPED | OUTPATIENT
Start: 2024-08-30 | End: 2024-09-29

## 2024-08-29 RX ORDER — ATORVASTATIN CALCIUM 80 MG/1
80 TABLET, FILM COATED ORAL NIGHTLY
Qty: 90 TABLET | Refills: 0 | Status: SHIPPED | OUTPATIENT
Start: 2024-08-29

## 2024-08-29 RX ORDER — ASPIRIN 81 MG/1
81 TABLET, CHEWABLE ORAL DAILY
Qty: 30 TABLET | Refills: 0 | Status: SHIPPED | OUTPATIENT
Start: 2024-08-30 | End: 2024-09-29

## 2024-08-29 RX ORDER — METOPROLOL TARTRATE 25 MG/1
25 TABLET, FILM COATED ORAL 2 TIMES DAILY
Qty: 60 TABLET | Refills: 0 | Status: SHIPPED | OUTPATIENT
Start: 2024-08-29 | End: 2024-09-28

## 2024-08-29 RX ORDER — LISINOPRIL 5 MG/1
5 TABLET ORAL DAILY
Qty: 30 TABLET | Refills: 0 | Status: SHIPPED | OUTPATIENT
Start: 2024-08-29 | End: 2024-09-28

## 2024-08-29 RX ORDER — METOPROLOL TARTRATE 50 MG
50 TABLET ORAL EVERY 12 HOURS SCHEDULED
Status: DISCONTINUED | OUTPATIENT
Start: 2024-08-29 | End: 2024-08-29 | Stop reason: HOSPADM

## 2024-08-29 RX ADMIN — ASPIRIN 81 MG CHEWABLE TABLET 81 MG: 81 TABLET CHEWABLE at 08:05

## 2024-08-29 RX ADMIN — ENOXAPARIN SODIUM 40 MG: 100 INJECTION SUBCUTANEOUS at 13:45

## 2024-08-29 RX ADMIN — AMLODIPINE BESYLATE 10 MG: 5 TABLET ORAL at 08:05

## 2024-08-29 RX ADMIN — CEFTRIAXONE 2000 MG: 2 INJECTION, POWDER, FOR SOLUTION INTRAMUSCULAR; INTRAVENOUS at 13:42

## 2024-08-29 RX ADMIN — ENOXAPARIN SODIUM 40 MG: 100 INJECTION SUBCUTANEOUS at 00:28

## 2024-08-29 RX ADMIN — METOPROLOL TARTRATE 50 MG: 50 TABLET, FILM COATED ORAL at 13:45

## 2024-08-29 RX ADMIN — Medication 10 ML: at 08:07

## 2024-08-29 NOTE — OUTREACH NOTE
Prep Survey      Flowsheet Row Responses   [facility] patient discharged from? [facility]   Is LACE score < 7 ? Yes   Eligibility [facility]   Date of Admission 08/27/24   Date of Discharge 08/29/24   Discharge Disposition Home or Self Care   Discharge diagnosis Confusion   Does the patient have one of the following disease processes/diagnoses(primary or secondary)? Other   Does the patient have Home health ordered? No   Is there a DME ordered? No   Prep survey completed? Yes            Sheridan CURRY - Registered Nurse

## 2024-08-29 NOTE — PLAN OF CARE
Problem: Hypertension Comorbidity  Goal: Blood Pressure in Desired Range  Outcome: Met  Intervention: Maintain Blood Pressure Management  Recent Flowsheet Documentation  Taken 8/29/2024 1200 by Daksha Balbuena RN  Medication Review/Management: medications reviewed  Taken 8/29/2024 1000 by Daksha Balbuena RN  Medication Review/Management: medications reviewed  Taken 8/29/2024 0800 by Daksha Balbuena RN  Medication Review/Management: medications reviewed     Problem: Adjustment to Illness (Stroke, Ischemic/Transient Ischemic Attack)  Goal: Optimal Coping  Outcome: Met  Intervention: Support Psychosocial Response to Stroke  Recent Flowsheet Documentation  Taken 8/29/2024 1200 by Daksha Balbuena RN  Supportive Measures: active listening utilized  Family/Support System Care: support provided  Taken 8/29/2024 0800 by Daksha Balbuena RN  Supportive Measures: active listening utilized  Family/Support System Care: support provided     Problem: Bowel Elimination Impaired (Stroke, Ischemic/Transient Ischemic Attack)  Goal: Effective Bowel Elimination  Outcome: Met     Problem: Cerebral Tissue Perfusion (Stroke, Ischemic/Transient Ischemic Attack)  Goal: Optimal Cerebral Tissue Perfusion  Outcome: Met  Intervention: Protect and Optimize Cerebral Perfusion  Recent Flowsheet Documentation  Taken 8/29/2024 1200 by Daksha Balbuena RN  Sensory Stimulation Regulation: care clustered  Taken 8/29/2024 0800 by Daksha Balbuena RN  Sensory Stimulation Regulation: care clustered     Problem: Cognitive Impairment (Stroke, Ischemic/Transient Ischemic Attack)  Goal: Optimal Cognitive Function  Outcome: Met  Intervention: Optimize Cognitive Function  Recent Flowsheet Documentation  Taken 8/29/2024 1200 by Daksha Balbuena RN  Sensory Stimulation Regulation: care clustered  Reorientation Measures:   calendar in view   clock in view  Taken 8/29/2024 0800 by Daksha Balbuena RN  Sensory Stimulation Regulation: care clustered  Reorientation Measures:    calendar in view   clock in view     Problem: Communication Impairment (Stroke, Ischemic/Transient Ischemic Attack)  Goal: Improved Communication Skills  Outcome: Met  Intervention: Optimize Communication Skills  Recent Flowsheet Documentation  Taken 8/29/2024 1200 by Daksha Balbuena RN  Communication Enhancement Strategies: call light answered in person  Taken 8/29/2024 0800 by Daksha Balbuena RN  Communication Enhancement Strategies: call light answered in person     Problem: Functional Ability Impaired (Stroke, Ischemic/Transient Ischemic Attack)  Goal: Optimal Functional Ability  Outcome: Met  Intervention: Optimize Functional Ability  Recent Flowsheet Documentation  Taken 8/29/2024 1200 by Daksha Balbuena RN  Activity Management: up in chair  Self-Care Promotion: independence encouraged  Taken 8/29/2024 0800 by Daksha Balbuena RN  Activity Management: up in chair  Self-Care Promotion: independence encouraged     Problem: Respiratory Compromise (Stroke, Ischemic/Transient Ischemic Attack)  Goal: Effective Oxygenation and Ventilation  Outcome: Met  Intervention: Optimize Oxygenation and Ventilation  Recent Flowsheet Documentation  Taken 8/29/2024 1200 by Daksha Balbuena RN  Head of Bed (HOB) Positioning: HOB elevated  Taken 8/29/2024 0800 by Daksha Balbuena RN  Head of Bed (HOB) Positioning: HOB elevated     Problem: Sensorimotor Impairment (Stroke, Ischemic/Transient Ischemic Attack)  Goal: Improved Sensorimotor Function  Outcome: Met  Intervention: Optimize Range of Motion, Motor Control and Function  Recent Flowsheet Documentation  Taken 8/29/2024 1200 by Daksha Balbuena RN  Range of Motion: active ROM (range of motion) encouraged  Taken 8/29/2024 0800 by Daksha Balbuena RN  Range of Motion: active ROM (range of motion) encouraged  Intervention: Optimize Sensory and Perceptual Ability  Recent Flowsheet Documentation  Taken 8/29/2024 0800 by Daksha Balbuena RN  Pressure Reduction Techniques: frequent weight shift  encouraged  Pressure Reduction Devices: pressure-redistributing mattress utilized     Problem: Swallowing Impairment (Stroke, Ischemic/Transient Ischemic Attack)  Goal: Optimal Eating and Swallowing without Aspiration  Outcome: Met  Intervention: Optimize Eating and Swallowing  Recent Flowsheet Documentation  Taken 8/29/2024 1200 by Daksha Balbuena RN  Aspiration Precautions: awake/alert before oral intake  Taken 8/29/2024 1000 by Daksha Balbuena RN  Aspiration Precautions: awake/alert before oral intake     Problem: Urinary Elimination Impaired (Stroke, Ischemic/Transient Ischemic Attack)  Goal: Effective Urinary Elimination  Outcome: Met     Problem: UTI (Urinary Tract Infection)  Goal: Improved Infection Symptoms  Outcome: Met  Intervention: Prevent Infection Progression  Recent Flowsheet Documentation  Taken 8/29/2024 1200 by Daksha Balbuena RN  Infection Management: aseptic technique maintained  Taken 8/29/2024 1000 by Daksha Balbuena RN  Infection Management: aseptic technique maintained     Problem: Fall Injury Risk  Goal: Absence of Fall and Fall-Related Injury  Outcome: Met  Intervention: Identify and Manage Contributors  Recent Flowsheet Documentation  Taken 8/29/2024 1200 by Daksha Balbuena RN  Medication Review/Management: medications reviewed  Self-Care Promotion: independence encouraged  Taken 8/29/2024 1000 by Daksha Balbuena RN  Medication Review/Management: medications reviewed  Taken 8/29/2024 0800 by Daksha Balbuena RN  Medication Review/Management: medications reviewed  Self-Care Promotion: independence encouraged  Intervention: Promote Injury-Free Environment  Recent Flowsheet Documentation  Taken 8/29/2024 1200 by Daksha Balbuena RN  Safety Promotion/Fall Prevention:   activity supervised   assistive device/personal items within reach   clutter free environment maintained   fall prevention program maintained   safety round/check completed   nonskid shoes/slippers when out of bed  Taken 8/29/2024 1000  by Daksha Balbuena, RN  Safety Promotion/Fall Prevention: safety round/check completed  Taken 8/29/2024 0800 by Daksha Balbuena, RN  Safety Promotion/Fall Prevention: assistive device/personal items within reach   Goal Outcome Evaluation:

## 2024-08-29 NOTE — PLAN OF CARE
Problem: Hypertension Comorbidity  Goal: Blood Pressure in Desired Range  Outcome: Ongoing, Progressing  Intervention: Maintain Blood Pressure Management  Recent Flowsheet Documentation  Taken 8/29/2024 0425 by Michelle Garza RN  Medication Review/Management: medications reviewed  Taken 8/29/2024 0200 by Michelle Garza RN  Medication Review/Management: medications reviewed  Taken 8/29/2024 0028 by Michelle Gazra RN  Medication Review/Management: medications reviewed  Taken 8/28/2024 2200 by Michelle Garza RN  Medication Review/Management: medications reviewed  Taken 8/28/2024 2010 by Michelle Garza RN  Medication Review/Management: medications reviewed     Problem: Adjustment to Illness (Stroke, Ischemic/Transient Ischemic Attack)  Goal: Optimal Coping  Outcome: Ongoing, Progressing  Intervention: Support Psychosocial Response to Stroke  Recent Flowsheet Documentation  Taken 8/29/2024 0425 by Michelle Garza RN  Supportive Measures: active listening utilized  Taken 8/29/2024 0028 by Michelle Garza RN  Supportive Measures:   active listening utilized   verbalization of feelings encouraged  Taken 8/28/2024 2010 by Michelle Garza RN  Supportive Measures:   active listening utilized   verbalization of feelings encouraged     Problem: Bowel Elimination Impaired (Stroke, Ischemic/Transient Ischemic Attack)  Goal: Effective Bowel Elimination  Outcome: Ongoing, Progressing     Problem: Cerebral Tissue Perfusion (Stroke, Ischemic/Transient Ischemic Attack)  Goal: Optimal Cerebral Tissue Perfusion  Outcome: Ongoing, Progressing  Intervention: Protect and Optimize Cerebral Perfusion  Recent Flowsheet Documentation  Taken 8/29/2024 0425 by Michelle Garza RN  Sensory Stimulation Regulation:   care clustered   lighting decreased  Taken 8/29/2024 0028 by Michelle Garza RN  Sensory Stimulation Regulation:   care clustered   lighting decreased   television on  Taken 8/28/2024 2010 by Michelle Garza RN  Sensory Stimulation  Regulation:   care clustered   lighting decreased   television on     Problem: Cognitive Impairment (Stroke, Ischemic/Transient Ischemic Attack)  Goal: Optimal Cognitive Function  Outcome: Ongoing, Progressing  Intervention: Optimize Cognitive Function  Recent Flowsheet Documentation  Taken 8/29/2024 0425 by Michelle Garza RN  Sensory Stimulation Regulation:   care clustered   lighting decreased  Reorientation Measures:   clock in view   calendar in view  Taken 8/29/2024 0028 by Michelle Garza RN  Sensory Stimulation Regulation:   care clustered   lighting decreased   television on  Reorientation Measures:   clock in view   calendar in view   glasses use encouraged  Taken 8/28/2024 2010 by Michelle Garza RN  Sensory Stimulation Regulation:   care clustered   lighting decreased   television on  Reorientation Measures:   clock in view   calendar in view   glasses use encouraged     Problem: Communication Impairment (Stroke, Ischemic/Transient Ischemic Attack)  Goal: Improved Communication Skills  Outcome: Ongoing, Progressing  Intervention: Optimize Communication Skills  Recent Flowsheet Documentation  Taken 8/29/2024 0425 by Michelle Garza RN  Communication Enhancement Strategies: call light answered in person  Taken 8/29/2024 0028 by Michelle Garza RN  Communication Enhancement Strategies: call light answered in person  Taken 8/28/2024 2010 by Michelle Garza RN  Communication Enhancement Strategies: call light answered in person     Problem: Functional Ability Impaired (Stroke, Ischemic/Transient Ischemic Attack)  Goal: Optimal Functional Ability  Outcome: Ongoing, Progressing  Intervention: Optimize Functional Ability  Recent Flowsheet Documentation  Taken 8/28/2024 2010 by Michelle Garza RN  Activity Management: up in chair     Problem: Respiratory Compromise (Stroke, Ischemic/Transient Ischemic Attack)  Goal: Effective Oxygenation and Ventilation  Outcome: Ongoing, Progressing  Intervention: Optimize Oxygenation  and Ventilation  Recent Flowsheet Documentation  Taken 8/29/2024 0425 by Michelle Garza RN  Head of Bed (HOB) Positioning: HOB elevated  Taken 8/29/2024 0200 by Michelle Garza RN  Head of Bed (HOB) Positioning: HOB elevated  Taken 8/29/2024 0028 by Michelle Garza RN  Head of Bed (HOB) Positioning: HOB elevated  Taken 8/28/2024 2200 by Michelle Garza RN  Head of Bed (HOB) Positioning: HOB elevated  Taken 8/28/2024 2010 by Michelle Garza RN  Head of Bed (HOB) Positioning: HOB elevated     Problem: Sensorimotor Impairment (Stroke, Ischemic/Transient Ischemic Attack)  Goal: Improved Sensorimotor Function  Outcome: Ongoing, Progressing  Intervention: Optimize Range of Motion, Motor Control and Function  Recent Flowsheet Documentation  Taken 8/29/2024 0425 by Michelle Garza RN  Range of Motion: active ROM (range of motion) encouraged  Taken 8/29/2024 0028 by Michelle Garza RN  Range of Motion: active ROM (range of motion) encouraged  Taken 8/28/2024 2010 by Michelle Garza RN  Range of Motion: active ROM (range of motion) encouraged  Intervention: Optimize Sensory and Perceptual Ability  Recent Flowsheet Documentation  Taken 8/29/2024 0425 by Michelle Garza RN  Pressure Reduction Techniques: frequent weight shift encouraged  Pressure Reduction Devices: pressure-redistributing mattress utilized  Taken 8/29/2024 0028 by Michelle Garza RN  Pressure Reduction Techniques:   frequent weight shift encouraged   weight shift assistance provided  Pressure Reduction Devices: pressure-redistributing mattress utilized  Taken 8/28/2024 2010 by Michelle Garza RN  Pressure Reduction Techniques: frequent weight shift encouraged     Problem: Swallowing Impairment (Stroke, Ischemic/Transient Ischemic Attack)  Goal: Optimal Eating and Swallowing without Aspiration  Outcome: Ongoing, Progressing     Problem: Urinary Elimination Impaired (Stroke, Ischemic/Transient Ischemic Attack)  Goal: Effective Urinary Elimination  Outcome: Ongoing,  Progressing     Problem: UTI (Urinary Tract Infection)  Goal: Improved Infection Symptoms  Outcome: Ongoing, Progressing     Problem: Fall Injury Risk  Goal: Absence of Fall and Fall-Related Injury  Outcome: Ongoing, Progressing  Intervention: Identify and Manage Contributors  Recent Flowsheet Documentation  Taken 8/29/2024 0425 by Michelle Garza RN  Medication Review/Management: medications reviewed  Taken 8/29/2024 0200 by Michelle Garza RN  Medication Review/Management: medications reviewed  Taken 8/29/2024 0028 by Michelle Garza RN  Medication Review/Management: medications reviewed  Taken 8/28/2024 2200 by Michelle Garza RN  Medication Review/Management: medications reviewed  Taken 8/28/2024 2010 by Michelle Garza RN  Medication Review/Management: medications reviewed  Intervention: Promote Injury-Free Environment  Recent Flowsheet Documentation  Taken 8/29/2024 0425 by Michelle Garza RN  Safety Promotion/Fall Prevention:   assistive device/personal items within reach   clutter free environment maintained   lighting adjusted   mobility aid in reach   nonskid shoes/slippers when out of bed   safety round/check completed   room organization consistent  Taken 8/29/2024 0200 by Michelle Garza RN  Safety Promotion/Fall Prevention:   assistive device/personal items within reach   clutter free environment maintained   lighting adjusted   mobility aid in reach   nonskid shoes/slippers when out of bed   safety round/check completed   room organization consistent  Taken 8/29/2024 0028 by Michelle Garza RN  Safety Promotion/Fall Prevention:   assistive device/personal items within reach   clutter free environment maintained   lighting adjusted   mobility aid in reach   nonskid shoes/slippers when out of bed   safety round/check completed   room organization consistent  Taken 8/28/2024 2200 by Michelle Garza RN  Safety Promotion/Fall Prevention:   assistive device/personal items within reach   clutter free environment  maintained   lighting adjusted   mobility aid in reach   nonskid shoes/slippers when out of bed   safety round/check completed   room organization consistent  Taken 8/28/2024 2010 by Michelle Garza RN  Safety Promotion/Fall Prevention:   assistive device/personal items within reach   clutter free environment maintained   lighting adjusted   mobility aid in reach   nonskid shoes/slippers when out of bed   safety round/check completed   room organization consistent   Goal Outcome Evaluation:

## 2024-08-29 NOTE — CONSULTS
"Nutrition Services    Patient Name: Mackenzie Cornell  YOB: 1960  MRN: [id number]  Admission date: 8/27/2024        NUTRITION SCREENING      Trending Narrative: 8/29: Nutrition assessment r/t malnutrition screening tool score of 2. Pt presented to ED via EMS on 8/27 with reports of confusion. Pt was driving and developed confusion forgetting how to get home. EMS also reports R-sided facial droop, and HTN. Pt has a history of Multiple sclerosis and transient global amnesia about 10 years ago that resolved within 1 day. Pt was admitted with suspected encephalopath related to hypertensive urgency and UTI.        PO Diet: Diet: Cardiac; Healthy Heart (2-3 Na+); Texture: Mechanical Ground (NDD 2); Fluid Consistency: Thin (IDDSI 0)   PO Supplements: none   Trending PO Intake:  8/29: 75% po intake        Nutritionally-Pertinent Medications RDN Reviewed, C/W clinical course         Labs (reviewed below): Reviewed. Management per attending.      Results from last 7 days   Lab Units 08/28/24  0321 08/27/24  1030   SODIUM mmol/L 140 140   POTASSIUM mmol/L 3.9 3.8   CHLORIDE mmol/L 104 103   CO2 mmol/L 22.9 24.9   BUN mg/dL 14 12   CREATININE mg/dL 0.72 0.73   CALCIUM mg/dL 9.3 9.4   BILIRUBIN mg/dL 0.4 0.6   ALK PHOS U/L 80 82   ALT (SGPT) U/L 19 22   AST (SGOT) U/L 24 21   GLUCOSE mg/dL 105* 112*     Results from last 7 days   Lab Units 08/28/24  0321 08/27/24  1137 08/27/24  1030   MAGNESIUM mg/dL 2.4  --   --    HEMOGLOBIN g/dL 13.5   < > 14.0   HEMATOCRIT % 43.9   < > 42.6   TRIGLYCERIDES mg/dL  --   --  144    < > = values in this interval not displayed.     Lab Results   Component Value Date    HGBA1C 5.78 (H) 08/27/2024          GI Function:  Last reported BM 8/26       Skin: No PI documented        Weight Review: Estimated body mass index is 47.55 kg/m² as calculated from the following:    Height as of this encounter: 162.6 cm (64\").    Weight as of this encounter: 126 kg (277 lb).    Comment:   8/29: " 277#  Current weight c/w 6 months ago.   Wt Readings from Last 30 Encounters:   08/27/24 1016 126 kg (277 lb)   02/06/24 1309 127 kg (279 lb)   12/15/23 1539 129 kg (285 lb)   11/21/23 1537 133 kg (294 lb)   11/18/23 0000 127 kg (279 lb)   10/14/21 1437 (!) 146 kg (321 lb 12.8 oz)   04/22/20 1010 (!) 144 kg (318 lb 6.4 oz)   02/12/18 1321 136 kg (300 lb)   02/05/18 1255 136 kg (300 lb)   01/17/18 1419 (!) 145 kg (320 lb)   11/28/17 1625 (!) 144 kg (316 lb 9.6 oz)   07/27/17 1014 132 kg (290 lb)   07/11/16 0853 136 kg (300 lb)          --       Nutrition Problem Statement: No nutrition diagnosis at this time.         Nutrition Intervention: Continue to encourage good po intake.     Will continue to monitor per protocol.           Monitoring/Evaluation Per protocol, I&O, PO intake, Pertinent labs, Weight, Skin status, GI status            RD to follow up per protocol.    Electronically signed by:  Chastity Brownlee RD  08/29/24 09:02 EDT

## 2024-08-29 NOTE — DISCHARGE SUMMARY
"             [facility] Medicine Services  Discharge Summary    Date of Service: 2024  Patient Name: Mackenzie Cornell  : 1960  MRN: [id number]    Date of Admission: 2024  Discharge Diagnosis: Acute encephalopathy resolved.   Date of Discharge:  2024  Primary Care Physician: Yarely Chand APRN      Presenting Problem:   Confusion [R41.0]  Urinary tract infection without hematuria, site unspecified [N39.0]    Active and Resolved Hospital Problems:  Active Hospital Problems    Diagnosis POA    **Confusion [R41.0] Yes      Resolved Hospital Problems   No resolved problems to display.         Hospital Course     HPI:  Per the H&P written by Rick on   \"This is 64-year-old morbidly obese female who was driving when developed some confusion and difficulty remembering how to get back home.  She called her  who advised her to go to the ER.  There was also concern about some facial droop.  In ER patient was awake and alert and did not complain of any specific weakness.  Neurology was consulted.  Blood pressure slightly on higher side.  Lab work essentially normal and lipid panel showed hyperlipidemia.  UA showed  pyuria.  Chest x-ray showed cardiomegaly.  CT and neck showed 50-60% right ICA stenosis but no stroke \"    Hospital Course:  # Acute encephalopathy resolved.   Suspect encephalopathy second to hypertensive urgency and UTI.   Neurology evaluated- stroke work up negative      # Carotid disease  - continue ASA and statin   - Follow up with outpt vascular for monitoring      # UTI  - continue Rocephin     # Hypertensive urgency  - started on amlodipine 10 mg, Lisinopril 5 mg and Metoprolol 25 mg BID added.   - monitor BP and adjust medications     # HLD  - Statin     # MS, diagnosed in   - Follows with Dr. Gonzales at [place]   - Per Dr. Gonzales: \"About 22 years ago-  She woke up with numbness over the feet bilaterally. No associated weakness over the extremities, bladder or " "bowel symptoms   She was seen with her chiropractor, who recommended to be evaluated by Neurology   She was evaluated Dr Drew Quinones at  with Neurology, had MRIs, had CSF  Dx with MS. Symptoms did not improved. She was given the option of commencing DMT, that she opted to continue off MS DMT.\"     Patient wants to go home today because of personal commitments. Discussed in detail with the patient regarding BP monitoring and close follow up within next 3 days with her primary care provider. She verbalized understanding.     DISCHARGE Follow Up Recommendations for labs and diagnostics:   Follow up with primary care provider within next 3 days.       Reasons For Change In Medications and Indications for New Medications:    Tab amlodipine 10 mg , Lisinopril 5 mg and Tab metoprolol 25 mg BID added. Monitor BP and adjust medications accordingly.    Day of Discharge     Vital Signs:  Temp:  [97.4 °F (36.3 °C)-98.6 °F (37 °C)] 98.6 °F (37 °C)  Heart Rate:  [72-85] 80  Resp:  [11-20] 12  BP: (130-174)/(68-93) 165/76        Pertinent  and/or Most Recent Results     LAB RESULTS:      Lab 08/28/24  0321 08/27/24  1137 08/27/24  1030   WBC 8.50 10.59 9.30  9.30   HEMOGLOBIN 13.5 13.5 14.0   HEMATOCRIT 43.9 42.1 42.6   PLATELETS 343 337 306   NEUTROS ABS 5.29 7.90* 6.67   IMMATURE GRANS (ABS) 0.03 0.04 0.04   LYMPHS ABS 2.46 1.91 1.79   MONOS ABS 0.50 0.57 0.60   EOS ABS 0.17 0.10 0.14   MCV 90.5 88.4 88.8   PROTIME  --  11.2 10.7   APTT  --  29.1 21.1*         Lab 08/28/24  0321 08/27/24  1030   SODIUM 140 140   POTASSIUM 3.9 3.8   CHLORIDE 104 103   CO2 22.9 24.9   ANION GAP 13.1 12.1   BUN 14 12   CREATININE 0.72 0.73   EGFR 92.3 90.8   GLUCOSE 105* 112*   CALCIUM 9.3 9.4   MAGNESIUM 2.4  --    HEMOGLOBIN A1C  --  5.78*   TSH  --  1.740  1.740         Lab 08/28/24  0321 08/27/24  1030   TOTAL PROTEIN 7.7 7.9   ALBUMIN 4.1 4.2   GLOBULIN 3.6 3.7   ALT (SGPT) 19 22   AST (SGOT) 24 21   BILIRUBIN 0.4 0.6   ALK PHOS 80 82 "         Lab 08/27/24  1137 08/27/24  1030   HSTROP T  --  35*   PROTIME 11.2 10.7   INR 1.03 0.98         Lab 08/27/24  1030   CHOLESTEROL 208*   LDL CHOL 136*   HDL CHOL 46   TRIGLYCERIDES 144         Lab 08/27/24  1040 08/27/24  1030   VITAMIN B 12  --  316   ABO TYPING A  --    RH TYPING Positive  --    ANTIBODY SCREEN Negative  --          Brief Urine Lab Results  (Last result in the past 365 days)        Color   Clarity   Blood   Leuk Est   Nitrite   Protein   CREAT   Urine HCG        08/27/24 1137 Yellow   Hazy   Negative   Moderate (2+)   Negative   Negative                 Microbiology Results (last 10 days)       Procedure Component Value - Date/Time    Blood Culture - Blood, Hand, Right [[id number]]  (Normal) Collected: 08/27/24 1300    Lab Status: Preliminary result Specimen: Blood from Hand, Right Updated: 08/28/24 1315     Blood Culture No growth at 24 hours    Blood Culture - Blood, Arm, Left [[id number]]  (Abnormal) Collected: 08/27/24 1257    Lab Status: Final result Specimen: Blood from Arm, Left Updated: 08/29/24 0825     Blood Culture Staphylococcus, coagulase negative     Isolated from Aerobic and Anaerobic Bottles     Gram Stain Aerobic Bottle Gram positive cocci in clusters      Anaerobic Bottle Gram positive cocci in clusters    Narrative:      Probable contaminant requires clinical correlation, susceptibility not performed unless requested by physician.      Less than seven (7) mL's of blood was collected.  Insufficient quantity may yield false negative results.    Blood Culture ID, PCR - Blood, Arm, Left [[id number]]  (Abnormal) Collected: 08/27/24 1257    Lab Status: Final result Specimen: Blood from Arm, Left Updated: 08/28/24 1103     BCID, PCR Staph spp, not aureus or lugdunensis. Identification by BCID2 PCR.     BOTTLE TYPE Aerobic Bottle    Urine Culture - Urine, Urine, Clean Catch [[id number]]  (Normal) Collected: 08/27/24 1137    Lab Status: Final result Specimen: Urine, Clean Catch  Updated: 08/28/24 1109     Urine Culture No growth            MRI Brain Without Contrast    Result Date: 8/27/2024  Impression: Impression: No acute intracranial finding. Electronically Signed: Denilson Bowman  8/27/2024 6:18 PM EDT  Workstation ID: [id number]    XR Chest 1 View    Result Date: 8/27/2024  Impression: Impression: Mild cardiomegaly. No acute cardiopulmonary findings. Electronically Signed: Kalani Aguillon MD  8/27/2024 11:12 AM EDT  Workstation ID: [id number]    CT Angiogram Head w AI Analysis of LVO    Result Date: 8/27/2024  Impression: Impression: Multifocal cervical and intracranial atherosclerotic changes are present, without evidence of focal severe stenosis, large vessel occlusion or aneurysm. 50 to 60% stenosis is present at the right ICA origin. Electronically Signed: Mega Ortega MD  8/27/2024 10:56 AM EDT  Workstation ID: [id number]    CT Angiogram Neck    Result Date: 8/27/2024  Impression: Impression: Multifocal cervical and intracranial atherosclerotic changes are present, without evidence of focal severe stenosis, large vessel occlusion or aneurysm. 50 to 60% stenosis is present at the right ICA origin. Electronically Signed: Mega Ortega MD  8/27/2024 10:56 AM EDT  Workstation ID: [id number]    CT Head Without Contrast Stroke Protocol    Result Date: 8/27/2024  Impression: No acute intracranial abnormality. Atrophy and chronic white matter changes compatible with sequela of small vessel ischemic disease and remote ischemic insults. No definite acute abnormality by CT noted.. Electronically Signed: Modesto Rahman MD  8/27/2024 10:34 AM EDT  Workstation ID: [id number]                 Labs Pending at Discharge:  Pending Labs       Order Current Status    Blood Culture - Blood, Hand, Right In process    Blood Culture - Blood, Hand, Right Preliminary result    Light Blue Top Edited    [place] Draw Edited                       Consults:   Consults       Date and Time Order Name Status Description     8/27/2024 12:11 PM Hospitalist (on-call MD unless specified)      8/27/2024 10:20 AM Inpatient Neurology Consult Stroke Completed     8/27/2024 10:20 AM Inpatient Neurology Consult Stroke Completed               Discharge Details        Discharge Medications        New Medications        Instructions Start Date   amLODIPine 10 MG tablet  Commonly known as: NORVASC   10 mg, Oral, Every 24 Hours Scheduled   Start Date: August 30, 2024     aspirin 81 MG chewable tablet   81 mg, Oral, Daily   Start Date: August 30, 2024     atorvastatin 80 MG tablet  Commonly known as: LIPITOR   80 mg, Oral, Nightly      lisinopril 5 MG tablet  Commonly known as: PRINIVIL,ZESTRIL   5 mg, Oral, Daily      metoprolol tartrate 25 MG tablet  Commonly known as: LOPRESSOR   25 mg, Oral, 2 Times Daily             Continue These Medications        Instructions Start Date   Multi For Her pack   1 tablet, Daily      Prednisolon-Moxiflox-Bromfenac 1-0.5-0.075 % solution   Ophthalmic, Instill 1 drop in operative eye QID the day before sx, the day of sx and 1 week after surgery then BID for 3 weeks               Allergies   Allergen Reactions    Sulfa Antibiotics Hives         Discharge Disposition:   Home or Self Care    Diet:  Hospital:  Diet Order   Procedures    Diet: Cardiac; Healthy Heart (2-3 Na+); Texture: Mechanical Ground (NDD 2); Fluid Consistency: Thin (IDDSI 0)         Discharge Activity:   as tolerated       CODE STATUS:  There are no questions and answers to display.                 Time spent on Discharge including face to face service:  >30 minutes    Signature: Electronically signed by Jeanna Munoz MD, 08/29/24, 13:12 EDT.  [facility] Hospitalist Team

## 2024-08-29 NOTE — CASE MANAGEMENT/SOCIAL WORK
Case Management Discharge Note      Final Note: Home    Provided Post Acute Provider List?: N/A  Provided Post Acute Provider Quality & Resource List?: N/A    Selected Continued Care - Discharged on 8/29/2024 Admission date: 8/27/2024 - Discharge disposition: Home or Self Care      Destination    No services have been selected for the patient.                  Transportation Services  Private: Car    Final Discharge Disposition Code: 01 - home or self-care

## 2024-08-30 ENCOUNTER — TRANSITIONAL CARE MANAGEMENT TELEPHONE ENCOUNTER (OUTPATIENT)
Dept: CALL CENTER | Facility: HOSPITAL | Age: 64
End: 2024-08-30
Payer: COMMERCIAL

## 2024-08-30 NOTE — OUTREACH NOTE
Call Center TCM Note      Flowsheet Row Responses   [facility] patient discharged from? [facility]   Does the patient have one of the following disease processes/diagnoses(primary or secondary)? Other   TCM attempt successful? Yes   Call start time 1406   Call end time 1417   Discharge diagnosis Acute encephalopathy resolved. UTI, Hypertensive urgency   Person spoke with today (if not patient) and relationship Patient   Meds reviewed with patient/caregiver? Yes   Does the patient have all medications ordered at discharge? Yes   Is the patient taking all medications as directed (includes completed medication regime)? Yes   Comments PCP Yarely MCKINNEY. Hospital follow up appt scheduled for 9/10  930am [facility] PCP   Does the patient have an appointment with their PCP within 7-14 days of discharge? No   Nursing Interventions Assisted patient with making appointment per protocol, Routed TCM call to PCP office   Has home health visited the patient within 72 hours of discharge? N/A   Psychosocial issues? No   Comments Patient to monitor home blood pressure   Did the patient receive a copy of their discharge instructions? Yes   Nursing interventions Reviewed instructions with patient   What is the patient's perception of their health status since discharge? Improving   Is the patient/caregiver able to teach back signs and symptoms related to disease process for when to call PCP? Yes   Is the patient/caregiver able to teach back signs and symptoms related to disease process for when to call 911? Yes   Is the patient/caregiver able to teach back the hierarchy of who to call/visit for symptoms/problems? PCP, Specialist, Home health nurse, Urgent Care, ED, 911 Yes   If the patient is a current smoker, are they able to teach back resources for cessation? Not a smoker   TCM call completed? Yes   Call end time 1417   Would this patient benefit from a Referral to [facility]? No   Is the patient interested in additional  calls from an ambulatory ? No            Jessica Whyte RN    8/30/2024, 14:19 EDT

## 2024-09-01 LAB — BACTERIA SPEC AEROBE CULT: NORMAL

## 2024-09-02 LAB — BACTERIA SPEC AEROBE CULT: NORMAL

## 2024-09-10 ENCOUNTER — OFFICE VISIT (OUTPATIENT)
Dept: FAMILY MEDICINE CLINIC | Facility: CLINIC | Age: 64
End: 2024-09-10
Payer: COMMERCIAL

## 2024-09-10 VITALS
WEIGHT: 277.4 LBS | TEMPERATURE: 97.3 F | HEART RATE: 56 BPM | RESPIRATION RATE: 18 BRPM | HEIGHT: 64 IN | DIASTOLIC BLOOD PRESSURE: 68 MMHG | BODY MASS INDEX: 47.36 KG/M2 | OXYGEN SATURATION: 97 % | SYSTOLIC BLOOD PRESSURE: 117 MMHG

## 2024-09-10 DIAGNOSIS — N02.9 PERSISTENT HEMATURIA: ICD-10-CM

## 2024-09-10 DIAGNOSIS — R30.0 DYSURIA: ICD-10-CM

## 2024-09-10 DIAGNOSIS — Z09 HOSPITAL DISCHARGE FOLLOW-UP: Primary | ICD-10-CM

## 2024-09-10 LAB
BILIRUB BLD-MCNC: NEGATIVE MG/DL
CLARITY, POC: ABNORMAL
COLOR UR: ABNORMAL
GLUCOSE UR STRIP-MCNC: NEGATIVE MG/DL
KETONES UR QL: NEGATIVE
LEUKOCYTE EST, POC: ABNORMAL
NITRITE UR-MCNC: NEGATIVE MG/ML
PH UR: 6 [PH] (ref 5–8)
PROT UR STRIP-MCNC: NEGATIVE MG/DL
RBC # UR STRIP: ABNORMAL /UL
SP GR UR: 1.02 (ref 1–1.03)
UROBILINOGEN UR QL: ABNORMAL

## 2024-09-10 PROCEDURE — 99495 TRANSJ CARE MGMT MOD F2F 14D: CPT

## 2024-09-10 PROCEDURE — 81003 URINALYSIS AUTO W/O SCOPE: CPT

## 2024-09-10 NOTE — PROGRESS NOTES
Subjective   Mackenzie Cornell is a 64 y.o. female. Presents to [facility]    Transitional Care Management Certification  I certify that the following are true:  Communication was made within 2 business days of discharge.  Complexity of Medical Decision Making is moderate.  Face to face visit occurred within 7 days.    *Note: 07505 is for high complexity patients with a face to face visit within 7 days of discharge.  55996 is for high complexity patients with a face to face on days 8-14 post discharge or medium complexity with face to face visit within 14 days post discharge.    Mackenzie was seen at [facility] . She was admitted on 08/27/2024  for confusion, urinary tract infection without hematuria. She was discharged on 08/29/2024. Discharge diagnosis was acute encephalopathy. Labs that were performed during the encounter included: cbc, cmp, troponin, PT/INR, lipid panel, vitamin b12, blood cultures, urinalysis and culture.  Diagnostic studies that were performed included: MRI-Brain, Chest X ray, CT angiogram neck, CT head without contrast.     Currently Mackenzie receives care at home. Complications from the hospital stay include none. The patient stated that they do not need help with their daily life and activities. The patient stated that they do have emotional support at home.  Current outpatient and discharge medications have been reconciled for the patient.  Reviewed by: HANK Villegas      Chief Complaint   Patient presents with    Hospital Follow Up Visit     Confusion       History of Present Illness     CT head without contrast stroke protocol completed.   Results of atrophy and chronic white matter changes compatible with small vessel ischemic disease and remote ischemic insults.  No acute abnormality.      History of Present Illness  The patient is a 64-year-old female presenting for a hospital follow-up. She is accompanied by an adult male.    She recently visited the hospital  due to an acute confusion episode and reports a urinary tract infection. Currently, she reports feeling well but expresses concern about the number of medications she is taking. She has brought a list of her medications for review.     CT angiogram head and neck completed with multifocal cervical and intracranial atherosclerosis present no severe stenosis Tomas or aneurysm.  50 to 60% stenosis at the right ICA origin.  Left ICA mild less than 50% narrowing.    Chest x-ray mild cardiomegaly otherwise no acute findings.    MRI brain without contrast notes generalized parenchymal volume loss, moderate confluent T2/FLAIR hyperintensity in the subcortical and periventricular white matter nonspecific but likely from small vessel ischemic change/hypertensive changes in 64-year-old patient.  Mild mucosal changes in the paranasal sinuses.  No acute intracranial finding.    She was evaluated by neurology stroke workup was negative.  She has carotid artery disease and continues on statin and aspirin.  She will follow-up with vascular for outpatient monitoring.    She was given Rocephin for urinary tract infection.    Hypertension was uncontrolled and she was started on amlodipine 10 mg, lisinopril 5 mg metoprolol 25 mg twice daily was added.    She has a history of multiple sclerosis and does not take medications per her choice.    She does not regularly see a cardiologist. She reports no chest pain, weakness, syncope, dizziness upon standing, lightheadedness, leg swelling, muscle weakness, or other concerns. She also reports no palpitations.    She has been diagnosed with carotid artery stenosis during her hospitalization.   She is currently taking atorvastatin and reports no muscle aches. She is making efforts to maintain a healthy diet, low in cholesterol and fat, and follows a Mediterranean diet. She avoids salt in her food.    She has multiple sclerosis and is followed by  neurology.  She uses [employer] for ambulation  but does walk without them at times.        I personally reviewed and updated the patient's allergies, medications, problem list, and past medical, surgical, social, and family history. I have reviewed and confirmed the accuracy of the History of Present Illness and Review of Symptoms as documented by the MA/LPN/RN. HANK Terrazas    She is scheduled for cataract surgery on Friday and requires clearance.   She is having cataract surgery Friday at [facility] and needs ok.  Home blood pressure readings were sporadic and erratic and normotensive versus hypertensive.  Patient was using a wrist blood pressure cuff and was advised to purchase an Omron large adult blood pressure cuff device.  She agrees to purchase the cuff and begin recording blood pressure readings 2 hours after medication use and occasionally in the early afternoon.  She will follow-up in 2 weeks for blood pressure check.    Allergies:  Allergies   Allergen Reactions    Sulfa Antibiotics Hives       Social History:  Social History     Socioeconomic History    Marital status:    Tobacco Use    Smoking status: Never    Smokeless tobacco: Never   Vaping Use    Vaping status: Never Used   Substance and Sexual Activity    Alcohol use: Yes     Comment: occasional beer.    Drug use: Never    Sexual activity: Defer       Family History:  Family History   Problem Relation Age of Onset    Hypertension Mother     Heart disease Mother         A-fib    Other Father         Alzheimers, dementia    Asthma Brother     No Known Problems Daughter     Asthma Son     Other Son         sleep apnea, narcelpsy    Leukemia Maternal Grandfather     Dementia Paternal Grandmother     Corneal ulcer Other     Parkinsonism Other        Past Medical History :  Patient Active Problem List   Diagnosis    Multiple sclerosis    Body mass index (BMI) of 50-59.9 in adult    Transient global amnesia    Acute metabolic encephalopathy    Leg wound, left, initial encounter     "History of urinary tract infection    Memory change    Paternal family history of dementia    Mixed hyperlipidemia    Prediabetes    Methylmalonic acidemia    Confusion       Medication List:    Current Outpatient Medications:     amLODIPine (NORVASC) 10 MG tablet, Take 1 tablet by mouth Daily for 30 days., Disp: 30 tablet, Rfl: 0    aspirin 81 MG chewable tablet, Chew 1 tablet Daily for 30 days., Disp: 30 tablet, Rfl: 0    atorvastatin (LIPITOR) 80 MG tablet, Take 1 tablet by mouth Every Night., Disp: 90 tablet, Rfl: 0    lisinopril (PRINIVIL,ZESTRIL) 5 MG tablet, Take 1 tablet by mouth Daily for 30 days., Disp: 30 tablet, Rfl: 0    metoprolol tartrate (LOPRESSOR) 25 MG tablet, Take 1 tablet by mouth 2 (Two) Times a Day for 30 days., Disp: 60 tablet, Rfl: 0    Multiple Vitamins-Minerals (MULTI FOR HER) pack, 1 tablet Daily., Disp: , Rfl:     Prednisolon-Moxiflox-Bromfenac 1-0.5-0.075 % solution, Apply  to eye(s) as directed by provider. Instill 1 drop in operative eye QID the day before sx, the day of sx and 1 week after surgery then BID for 3 weeks, Disp: , Rfl:     Past Surgical History:  Past Surgical History:   Procedure Laterality Date    COLONOSCOPY      TONSILLECTOMY      WISDOM TOOTH EXTRACTION         Review of Systems:  Review of Systems   Cardiovascular:  Negative for chest pain, palpitations and leg swelling.   Genitourinary:  Negative for decreased urine volume, dysuria, flank pain, frequency and urgency.   Musculoskeletal:  Positive for arthralgias and gait problem (Uses two canes for ambulation.).   Neurological:  Negative for dizziness and light-headedness.   All other systems reviewed and are negative.      Physical Exam:  Vital Signs:  Vital Signs:   /68 (BP Location: Right arm, Patient Position: Sitting, Cuff Size: Large Adult)   Pulse 56   Temp 97.3 °F (36.3 °C) (Temporal)   Resp 18   Ht 162.6 cm (64.02\")   Wt 126 kg (277 lb 6.4 oz)   SpO2 97%   BMI 47.59 kg/m²     Result Review : "              Results  Laboratory Studies  Urine test showed white blood cells and 1+ bacteria. Blood glucose level was 105.    Imaging  Carotid artery stenosis: 50-60% in the right internal carotid artery, less than 50% in the left.      Physical Exam  Vitals and nursing note reviewed.   Constitutional:       General: She is not in acute distress.     Appearance: She is obese.   HENT:      Head: Normocephalic and atraumatic.      Right Ear: External ear normal.      Left Ear: External ear normal.      Nose: Nose normal.   Eyes:      General: Lids are normal. Vision grossly intact.   Neck:      Vascular: No carotid bruit.   Cardiovascular:      Rate and Rhythm: Normal rate and regular rhythm.      Heart sounds: Normal heart sounds.   Pulmonary:      Effort: Pulmonary effort is normal.      Breath sounds: Normal breath sounds and air entry.   Abdominal:      General: Abdomen is protuberant. Bowel sounds are normal. There is no distension or abdominal bruit. There are no signs of injury.      Palpations: Abdomen is soft.      Tenderness: There is no abdominal tenderness.   Musculoskeletal:         General: No deformity.      Cervical back: Normal range of motion.   Skin:     General: Skin is warm and dry.   Neurological:      Mental Status: She is alert and oriented to person, place, and time. Mental status is at baseline.   Psychiatric:         Attention and Perception: Attention normal.         Mood and Affect: Mood normal.         Speech: Speech normal.         Behavior: Behavior normal. Behavior is cooperative.         Thought Content: Thought content normal.       Physical Exam  Vital Signs  Blood pressure reading today is 117/68.    Assessment and Plan:  Problems Addressed this Visit    None  Visit Diagnoses       Hospital discharge follow-up    -  Primary    Dysuria        Relevant Orders    POCT urinalysis dipstick, multipro (Completed)    Urinalysis With Microscopic - Urine, Clean Catch    Urine Culture -  Urine, Urine, Clean Catch    Persistent hematuria        Relevant Orders    US Renal Bilateral          Diagnoses         Codes Comments    Hospital discharge follow-up    -  Primary ICD-10-CM: Z09  ICD-9-CM: V67.59     Dysuria     ICD-10-CM: R30.0  ICD-9-CM: 788.1     Persistent hematuria     ICD-10-CM: N02.9  ICD-9-CM: 599.70            Diagnoses and all orders for this visit:    1. Hospital discharge follow-up (Primary)    2. Dysuria  -     POCT urinalysis dipstick, multipro  -     Urinalysis With Microscopic - Urine, Clean Catch  -     Urine Culture - Urine, Urine, Clean Catch    3. Persistent hematuria  -     US Renal Bilateral      Assessment & Plan  1. Urinary Tract Infection.  She was treated with Rocephin in the emergency room. A repeat urine test will be conducted today to ensure the resolution of the infection. If the infection persists, additional medications may be required.    2. Hypertension.  Her blood pressure readings were elevated during her hospital stay (165/76) and at home (181/87). Today's reading is 117/68. She is advised to monitor her blood pressure using a large cuff, two hours post-medication, and three to four times in the evening throughout the week. This will help identify any fluctuations and determine if medication adjustments are necessary. She is advised to continue her current medication regimen, including amlodipine 10 mg, aspirin 81 mg, atorvastatin, lisinopril 5 mg, and metoprolol. She should maintain a low-salt diet to help manage her blood pressure.    3. Carotid Artery Stenosis.  She has 50-60% stenosis in the right internal carotid artery and less than 50% in the left. No intervention is required until the stenosis reaches 70%. An ultrasound will be repeated in a year to monitor the condition.    4. White Matter Changes.  Minor white matter changes were noted, which could be attributed to age, vessel narrowing, or cholesterol deposits from diet. She is advised to continue  her statin medication and maintain a healthy diet to manage cholesterol levels.    5. Cataract Surgery.  She is scheduled for cataract surgery on Friday. Her recent hospital work-up, including an EKG, was satisfactory, and she is cleared for surgery.      Risk for Readmission (LACE) Score: 3 (8/29/2024  6:00 AM)      An After Visit Summary and PPPS were given to the patient.       I wore protective equipment throughout this patient encounter to include mask and eye protection. Hand hygiene was performed before donning protective equipment and after removal when leaving the room.    Follow Up   No follow-ups on file.  Patient was given instructions and counseling regarding her condition or for health maintenance advice. Please see specific information pulled into the AVS if appropriate.    Patient Instructions   Purchase Omron Brand large adult cuff blood pressure device.        This document is intended for medical expert use only. Reading of this document by patients and/or patient's family without participating medical staff guidance may result in misinterpretation and unintended morbidity. Any interpretation of such data is the responsibility of the patient and/or family member responsible for the patient in concert with their primary or specialist providers, not to be left for sources of online searches such as [employer], [employer] or similar queries. Relying on these approaches to knowledge may result in misinterpretation, misguided goals of care and even death should patients or family members try recommendations outside of the realm of professional medical care.      Patient or patient representative verbalized consent for the use of Ambient Listening during the visit with  HANK Villegas for chart documentation. 9/10/2024  09:18 EDT

## 2024-09-14 LAB
APPEARANCE UR: ABNORMAL
BACTERIA #/AREA URNS HPF: ABNORMAL /[HPF]
BACTERIA UR CULT: ABNORMAL
BILIRUB UR QL STRIP: NEGATIVE
CASTS URNS QL MICRO: ABNORMAL /LPF
COLOR UR: YELLOW
EPI CELLS #/AREA URNS HPF: >10 /HPF (ref 0–10)
GLUCOSE UR QL STRIP: NEGATIVE
HGB UR QL STRIP: ABNORMAL
KETONES UR QL STRIP: NEGATIVE
LEUKOCYTE ESTERASE UR QL STRIP: ABNORMAL
MICRO URNS: ABNORMAL
NITRITE UR QL STRIP: NEGATIVE
OTHER ANTIBIOTIC SUSC ISLT: ABNORMAL
PH UR STRIP: 6 [PH] (ref 5–7.5)
PROT UR QL STRIP: ABNORMAL
RBC #/AREA URNS HPF: ABNORMAL /HPF (ref 0–2)
SP GR UR STRIP: 1.02 (ref 1–1.03)
UROBILINOGEN UR STRIP-MCNC: 0.2 MG/DL (ref 0.2–1)
WBC #/AREA URNS HPF: >30 /HPF (ref 0–5)

## 2024-09-19 DIAGNOSIS — A49.8 ENTEROCOCCUS FAECALIS INFECTION: Primary | ICD-10-CM

## 2024-09-19 PROBLEM — Z16.21 INFECTION DUE TO VANCOMYCIN RESISTANT ENTEROCOCCUS FAECIUM: Status: ACTIVE | Noted: 2024-09-19

## 2024-09-19 RX ORDER — NITROFURANTOIN 25; 75 MG/1; MG/1
100 CAPSULE ORAL 2 TIMES DAILY
Qty: 14 CAPSULE | Refills: 0 | Status: SHIPPED | OUTPATIENT
Start: 2024-09-19

## 2024-09-24 ENCOUNTER — OFFICE VISIT (OUTPATIENT)
Dept: FAMILY MEDICINE CLINIC | Facility: CLINIC | Age: 64
End: 2024-09-24
Payer: COMMERCIAL

## 2024-09-24 VITALS
TEMPERATURE: 97.5 F | SYSTOLIC BLOOD PRESSURE: 116 MMHG | HEIGHT: 64 IN | RESPIRATION RATE: 18 BRPM | BODY MASS INDEX: 47.7 KG/M2 | WEIGHT: 279.4 LBS | HEART RATE: 96 BPM | DIASTOLIC BLOOD PRESSURE: 58 MMHG | OXYGEN SATURATION: 98 %

## 2024-09-24 DIAGNOSIS — R30.0 DYSURIA: ICD-10-CM

## 2024-09-24 DIAGNOSIS — I10 HYPERTENSION, UNSPECIFIED TYPE: ICD-10-CM

## 2024-09-24 PROCEDURE — 81003 URINALYSIS AUTO W/O SCOPE: CPT

## 2024-09-24 PROCEDURE — 99213 OFFICE O/P EST LOW 20 MIN: CPT

## 2024-10-07 ENCOUNTER — LAB (OUTPATIENT)
Dept: FAMILY MEDICINE CLINIC | Facility: CLINIC | Age: 64
End: 2024-10-07
Payer: COMMERCIAL

## 2024-10-07 DIAGNOSIS — R31.9 HEMATURIA, UNSPECIFIED TYPE: ICD-10-CM

## 2024-10-07 DIAGNOSIS — R30.0 DYSURIA: Primary | ICD-10-CM

## 2024-10-07 LAB
BILIRUB BLD-MCNC: NEGATIVE MG/DL
CLARITY, POC: ABNORMAL
COLOR UR: ABNORMAL
GLUCOSE UR STRIP-MCNC: NEGATIVE MG/DL
KETONES UR QL: NEGATIVE
LEUKOCYTE EST, POC: ABNORMAL
NITRITE UR-MCNC: NEGATIVE MG/ML
PH UR: 6 [PH] (ref 5–8)
PROT UR STRIP-MCNC: NEGATIVE MG/DL
RBC # UR STRIP: ABNORMAL /UL
SP GR UR: 1.03 (ref 1–1.03)
UROBILINOGEN UR QL: ABNORMAL

## 2024-10-07 PROCEDURE — 81003 URINALYSIS AUTO W/O SCOPE: CPT

## 2024-10-07 RX ORDER — ATORVASTATIN CALCIUM 80 MG/1
80 TABLET, FILM COATED ORAL NIGHTLY
Qty: 90 TABLET | Refills: 0 | Status: SHIPPED | OUTPATIENT
Start: 2024-10-07

## 2024-10-08 RX ORDER — METOPROLOL TARTRATE 25 MG/1
25 TABLET, FILM COATED ORAL 2 TIMES DAILY
Qty: 60 TABLET | Refills: 0 | Status: SHIPPED | OUTPATIENT
Start: 2024-10-08 | End: 2024-10-08

## 2024-10-08 RX ORDER — METOPROLOL TARTRATE 25 MG/1
25 TABLET, FILM COATED ORAL 2 TIMES DAILY
Qty: 180 TABLET | Refills: 0 | Status: SHIPPED | OUTPATIENT
Start: 2024-10-08

## 2024-10-08 RX ORDER — LISINOPRIL 5 MG/1
5 TABLET ORAL DAILY
Qty: 90 TABLET | OUTPATIENT
Start: 2024-10-08

## 2024-10-08 RX ORDER — LISINOPRIL 5 MG/1
5 TABLET ORAL DAILY
Qty: 30 TABLET | Refills: 0 | Status: SHIPPED | OUTPATIENT
Start: 2024-10-08 | End: 2024-11-07

## 2024-10-08 NOTE — TELEPHONE ENCOUNTER
Caller: AnetaBritneyhy S    Relationship: Self    Best call back number:     Requested Prescriptions:   Requested Prescriptions     Pending Prescriptions Disp Refills    lisinopril (PRINIVIL,ZESTRIL) 5 MG tablet 30 tablet 0     Sig: Take 1 tablet by mouth Daily for 30 days.    metoprolol tartrate (LOPRESSOR) 25 MG tablet 60 tablet 0     Sig: Take 1 tablet by mouth 2 (Two) Times a Day for 30 days.        Pharmacy where request should be sent: [id number] [place] [place] [place] [facility] [address] [facility] [address] - [phone] PH - [phone] FX     Last office visit with prescribing clinician: 9/24/2024   Last telemedicine visit with prescribing clinician: Visit date not found   Next office visit with prescribing clinician: Visit date not found     Additional details provided by patient:     Does the patient have less than a 3 day supply:  [x] Yes  [] No    Would you like a call back once the refill request has been completed: [] Yes [] No    If the office needs to give you a call back, can they leave a voicemail: [] Yes [] No    Cristin Vincent Rep   10/08/24 14:53 EDT

## 2024-10-10 LAB
BACTERIA UR CULT: NORMAL
BACTERIA UR CULT: NORMAL

## 2024-10-11 ENCOUNTER — TELEPHONE (OUTPATIENT)
Dept: FAMILY MEDICINE CLINIC | Facility: CLINIC | Age: 64
End: 2024-10-11
Payer: COMMERCIAL

## 2024-10-11 NOTE — TELEPHONE ENCOUNTER
Caller: Mackenzie Cornell    Relationship: Self    Best call back number:[phone]    What medication are you requesting: ANTIBIOTIC FOR UTI    What are your current symptoms: URINE SPECIMEN  FREQUENT URINATION      Have you had these symptoms before:    [x] Yes  [] No    Have you been treated for these symptoms before:   [x] Yes  [] No    If a prescription is needed, what is your preferred pharmacy and phone number:  [id number] [place] - [address] AT [facility] [address] - [phone]  - [phone] FX     Additional notes:  PLEASE CALL WHEN PRESCRIPTION SENT

## 2024-10-11 NOTE — TELEPHONE ENCOUNTER
Caller: Mackenzie Cornell    Relationship: Self    Best call back number:     [phone]       What medication are you requesting: ANTIBIOTICS    What are your current symptoms: UTI    How long have you been experiencing symptoms:     Have you had these symptoms before:    [x] Yes  [] No    Have you been treated for these symptoms before:   [x] Yes  [] No    If a prescription is needed, what is your preferred pharmacy and phone number: [facility] #[id number] - [place], [place] - [address] AT [facility] [address] - [phone]  - [phone] FX     Additional notes:

## 2024-11-11 ENCOUNTER — LAB (OUTPATIENT)
Dept: FAMILY MEDICINE CLINIC | Facility: CLINIC | Age: 64
End: 2024-11-11
Payer: COMMERCIAL

## 2024-11-11 DIAGNOSIS — R31.9 HEMATURIA, UNSPECIFIED TYPE: Primary | ICD-10-CM

## 2024-11-11 DIAGNOSIS — N83.8 OVARIAN MASS, LEFT: Primary | ICD-10-CM

## 2024-11-11 LAB
BILIRUB BLD-MCNC: NEGATIVE MG/DL
CLARITY, POC: ABNORMAL
COLOR UR: YELLOW
GLUCOSE UR STRIP-MCNC: NEGATIVE MG/DL
KETONES UR QL: NEGATIVE
LEUKOCYTE EST, POC: ABNORMAL
NITRITE UR-MCNC: NEGATIVE MG/ML
PH UR: 5.5 [PH] (ref 5–8)
PROT UR STRIP-MCNC: ABNORMAL MG/DL
RBC # UR STRIP: ABNORMAL /UL
SP GR UR: 1.03 (ref 1–1.03)
UROBILINOGEN UR QL: ABNORMAL

## 2024-11-11 PROCEDURE — 81003 URINALYSIS AUTO W/O SCOPE: CPT

## 2024-11-13 LAB
BACTERIA UR CULT: NORMAL
BACTERIA UR CULT: NORMAL

## 2024-11-26 ENCOUNTER — HOSPITAL ENCOUNTER (OUTPATIENT)
Dept: ULTRASOUND IMAGING | Facility: HOSPITAL | Age: 64
Discharge: HOME | End: 2024-11-26
Payer: COMMERCIAL

## 2024-11-26 DIAGNOSIS — N83.8 OVARIAN MASS, LEFT: ICD-10-CM

## 2024-11-26 PROCEDURE — 76856 US EXAM PELVIC COMPLETE: CPT

## 2024-11-26 PROCEDURE — 76830 TRANSVAGINAL US NON-OB: CPT

## 2024-12-02 DIAGNOSIS — N85.8 UTERINE MASS: Primary | ICD-10-CM

## 2024-12-02 DIAGNOSIS — R93.89 THICKENED ENDOMETRIUM: ICD-10-CM

## 2025-01-08 ENCOUNTER — TELEPHONE (OUTPATIENT)
Dept: FAMILY MEDICINE CLINIC | Facility: CLINIC | Age: 65
End: 2025-01-08

## 2025-01-08 NOTE — TELEPHONE ENCOUNTER
Caller: ZULMA WADE/ DRE    Relationship to patient:     Best call back number: [phone]     Patient is needing: ZULMA WITH [employer] WANTED TO REACH OUT AND LET ANJANA KNOW THAT SHE IS AN AVAILABLE RESOURCE WHEN IT COMES TO HELPING WITH INSURANCE BUT ALSO COORDINATING CARE FOR THE PATIENT.    IF ANYTHING IS NEEDED PLEASE REACH OUT TO HER AT HER DIRECT LINE ABOVE

## 2025-03-25 ENCOUNTER — OFFICE VISIT (OUTPATIENT)
Dept: FAMILY MEDICINE CLINIC | Facility: CLINIC | Age: 65
End: 2025-03-25
Payer: COMMERCIAL

## 2025-03-25 VITALS
HEIGHT: 64 IN | OXYGEN SATURATION: 97 % | SYSTOLIC BLOOD PRESSURE: 136 MMHG | TEMPERATURE: 97.8 F | RESPIRATION RATE: 18 BRPM | HEART RATE: 96 BPM | BODY MASS INDEX: 45.68 KG/M2 | WEIGHT: 267.6 LBS | DIASTOLIC BLOOD PRESSURE: 74 MMHG

## 2025-03-25 DIAGNOSIS — R41.0 CONFUSION: ICD-10-CM

## 2025-03-25 DIAGNOSIS — G35 MULTIPLE SCLEROSIS: ICD-10-CM

## 2025-03-25 DIAGNOSIS — R73.9 HYPERGLYCEMIA: ICD-10-CM

## 2025-03-25 DIAGNOSIS — R73.03 PREDIABETES: ICD-10-CM

## 2025-03-25 DIAGNOSIS — N30.90 CYSTITIS: Primary | ICD-10-CM

## 2025-03-25 LAB
BILIRUB BLD-MCNC: NEGATIVE MG/DL
CLARITY, POC: CLEAR
COLOR UR: YELLOW
EXPIRATION DATE: NORMAL
GLUCOSE UR STRIP-MCNC: NEGATIVE MG/DL
HBA1C MFR BLD: 5.6 % (ref 4.5–5.7)
KETONES UR QL: NEGATIVE
LEUKOCYTE EST, POC: ABNORMAL
Lab: NORMAL
NITRITE UR-MCNC: NEGATIVE MG/ML
PH UR: 6 [PH] (ref 5–8)
PROT UR STRIP-MCNC: ABNORMAL MG/DL
RBC # UR STRIP: ABNORMAL /UL
SP GR UR: 1.02 (ref 1–1.03)
UROBILINOGEN UR QL: ABNORMAL

## 2025-03-25 RX ORDER — NITROFURANTOIN 25; 75 MG/1; MG/1
100 CAPSULE ORAL 2 TIMES DAILY
Qty: 14 CAPSULE | Refills: 0 | Status: SHIPPED | OUTPATIENT
Start: 2025-03-25

## 2025-03-25 NOTE — PROGRESS NOTES
Chief Complaint  Altered Mental Status and Prediabetes    Subjective     CC  Problem List  Visit Diagnosis   Encounters  Notes  Medications  Labs  Result Review Imaging  Media    Mackenzie Cornell presents to [facility] for   History of Present Illness  Patient presents to the office today with concerns of confusion, believes that she may have a urinary tract infection. Patient spouse is present today and reports Mackenzie has been having conversations that do not make sense and getting lost as well. She suffers from MS as well.   Urinary Tract Infection   This is a new problem. The pain is at a severity of 0/10. The patient is experiencing no pain. There has been no fever. Associated symptoms include frequency. Pertinent negatives include no chills, discharge, flank pain, hematuria, hesitancy, nausea, sweats, urgency or vomiting. Associated symptoms comments: Confusion. Her past medical history is significant for recurrent UTIs.   Blood Sugar Problem  Today's concern : Prediabetes.   Symptoms are chronic.   Onset was 1 to 5 years.   Symptoms have been improved since onset.   Pertinent negative symptoms include no abdominal pain, no joint pain, no chest pain, no chills, no fatigue, no headaches, no nausea, no rash, no dysuria, no vertigo, no visual change and no vomiting.   Aggravating factors include eating and drinking.   Treatments tried: Diet and Exercise.       Review of Systems   Constitutional:  Negative for chills, fatigue and unexpected weight loss.   Cardiovascular:  Negative for chest pain and leg swelling.   Gastrointestinal:  Negative for abdominal pain, constipation, diarrhea, nausea and vomiting.   Endocrine: Negative for cold intolerance, heat intolerance, polydipsia and polyuria.   Genitourinary:  Positive for frequency. Negative for dysuria, flank pain, hematuria, hesitancy and urgency.   Musculoskeletal:  Negative for joint pain.   Skin:  Negative for rash.  "  Neurological:  Negative for vertigo.   Hematological:  Negative for adenopathy. Does not bruise/bleed easily.        Objective   Vital Signs:   /74 (BP Location: Right arm, Patient Position: Sitting, Cuff Size: Large Adult)   Pulse 96   Temp 97.8 °F (36.6 °C) (Temporal)   Resp 18   Ht 162.6 cm (64.02\")   Wt 121 kg (267 lb 9.6 oz)   SpO2 97%   BMI 45.90 kg/m²     Physical Exam  Constitutional:       Appearance: She is obese.   Neck:      Thyroid: No thyromegaly.      Vascular: No JVD.   Cardiovascular:      Rate and Rhythm: Normal rate and regular rhythm.      Heart sounds: No murmur heard.  Pulmonary:      Effort: Pulmonary effort is normal.      Breath sounds: Normal breath sounds. No wheezing.   Abdominal:      General: Abdomen is flat. Bowel sounds are normal.      Palpations: Abdomen is soft. There is no mass.      Tenderness: There is no abdominal tenderness. There is no right CVA tenderness or left CVA tenderness.   Musculoskeletal:      Cervical back: Neck supple.   Lymphadenopathy:      Cervical: No cervical adenopathy.   Skin:     Findings: No rash.   Neurological:      Mental Status: She is alert.        Result Review :Labs  Result Review  Imaging  Med Tab  Media                 Assessment and Plan CC Problem List  Visit Diagnosis  ROS  Review (Popup)  Health Maintenance  Quality  BestPractice  Medications  SmartSets  SnapShot Encounters  Media  Problem List Items Addressed This Visit          Unprioritized    Multiple sclerosis    Overview   Last seen by Dr. Drew Quinones - JOHNNY   Diagnosed in 2003.  Dr. Dickey 2/13/24 at [facility].          Current Assessment & Plan   Sxs are stable.          Prediabetes    Overview   A1c 5.6 03/25/2025 improved.   A1c 6.3 01/2024         Current Assessment & Plan   Continue healthy diet and much exercise as possible.          Confusion    Current Assessment & Plan     Aggravated by UTI, and MS.  feels she is near baseline " and will likely improve after Rx for UTI.          Relevant Orders    POCT urinalysis dipstick, manual (Completed)     Other Visit Diagnoses         Cystitis    -  Primary    Abx fluids, confirmed by cx    Relevant Medications    nitrofurantoin, macrocrystal-monohydrate, (Macrobid) 100 MG capsule    Other Relevant Orders    Urine Culture - Urine, Urine, Random Void (Completed)      Hyperglycemia        Relevant Orders    POC Glycosylated Hemoglobin (Hb A1C) (Completed)            Follow Up Instructions Charge Capture  Follow-up Communications  Return if symptoms worsen or fail to improve.  Patient was given instructions and counseling regarding her condition or for health maintenance advice. Please see specific information pulled into the AVS if appropriate.

## 2025-03-27 LAB
BACTERIA UR CULT: NORMAL
BACTERIA UR CULT: NORMAL

## 2025-04-02 NOTE — ASSESSMENT & PLAN NOTE
Aggravated by UTI, and MS.  feels she is near baseline and will likely improve after Rx for UTI.

## 2025-06-18 NOTE — TELEPHONE ENCOUNTER
Called to outreach, left message   dry, intact, no bleeding and no hematoma. PACU Admission/Event Note

## 2025-08-11 DIAGNOSIS — I65.21 STENOSIS OF RIGHT CAROTID ARTERY: Primary | ICD-10-CM

## 2025-08-15 ENCOUNTER — TELEPHONE (OUTPATIENT)
Dept: FAMILY MEDICINE CLINIC | Facility: CLINIC | Age: 65
End: 2025-08-15
Payer: COMMERCIAL